# Patient Record
Sex: MALE | Race: WHITE | HISPANIC OR LATINO | Employment: OTHER | URBAN - METROPOLITAN AREA
[De-identification: names, ages, dates, MRNs, and addresses within clinical notes are randomized per-mention and may not be internally consistent; named-entity substitution may affect disease eponyms.]

---

## 2017-04-25 ENCOUNTER — LAB VISIT (OUTPATIENT)
Dept: LAB | Facility: HOSPITAL | Age: 82
End: 2017-04-25
Attending: UROLOGY
Payer: MEDICARE

## 2017-04-25 ENCOUNTER — OFFICE VISIT (OUTPATIENT)
Dept: UROLOGY | Facility: CLINIC | Age: 82
End: 2017-04-25
Payer: COMMERCIAL

## 2017-04-25 VITALS
DIASTOLIC BLOOD PRESSURE: 58 MMHG | SYSTOLIC BLOOD PRESSURE: 157 MMHG | HEIGHT: 67 IN | HEART RATE: 62 BPM | WEIGHT: 158.75 LBS | BODY MASS INDEX: 24.92 KG/M2

## 2017-04-25 DIAGNOSIS — N40.0 BENIGN PROSTATIC HYPERPLASIA, PRESENCE OF LOWER URINARY TRACT SYMPTOMS UNSPECIFIED, UNSPECIFIED MORPHOLOGY: Primary | ICD-10-CM

## 2017-04-25 DIAGNOSIS — N40.0 BENIGN PROSTATIC HYPERPLASIA, PRESENCE OF LOWER URINARY TRACT SYMPTOMS UNSPECIFIED, UNSPECIFIED MORPHOLOGY: ICD-10-CM

## 2017-04-25 DIAGNOSIS — R35.1 NOCTURIA: ICD-10-CM

## 2017-04-25 DIAGNOSIS — N52.01 ERECTILE DYSFUNCTION DUE TO ARTERIAL INSUFFICIENCY: ICD-10-CM

## 2017-04-25 LAB
BILIRUB SERPL-MCNC: NORMAL MG/DL
BLOOD URINE, POC: NORMAL
COLOR, POC UA: YELLOW
COMPLEXED PSA SERPL-MCNC: 1.1 NG/ML
GLUCOSE UR QL STRIP: NORMAL
KETONES UR QL STRIP: NORMAL
LEUKOCYTE ESTERASE URINE, POC: NORMAL
NITRITE, POC UA: NORMAL
PH, POC UA: 5
PROTEIN, POC: NORMAL
SPECIFIC GRAVITY, POC UA: 1
UROBILINOGEN, POC UA: NORMAL

## 2017-04-25 PROCEDURE — 81002 URINALYSIS NONAUTO W/O SCOPE: CPT | Mod: S$GLB,,, | Performed by: UROLOGY

## 2017-04-25 PROCEDURE — 99999 PR PBB SHADOW E&M-EST. PATIENT-LVL III: CPT | Mod: PBBFAC,,, | Performed by: UROLOGY

## 2017-04-25 PROCEDURE — 99214 OFFICE O/P EST MOD 30 MIN: CPT | Mod: 25,S$GLB,, | Performed by: UROLOGY

## 2017-04-25 PROCEDURE — 84153 ASSAY OF PSA TOTAL: CPT

## 2017-04-25 PROCEDURE — 36415 COLL VENOUS BLD VENIPUNCTURE: CPT | Mod: PO

## 2017-04-25 RX ORDER — TADALAFIL 20 MG/1
20 TABLET ORAL DAILY
Qty: 20 TABLET | Refills: 11 | Status: SHIPPED | OUTPATIENT
Start: 2017-04-25 | End: 2018-04-26

## 2017-04-25 NOTE — PROGRESS NOTES
UROLOGY [place]  4 25 17    Urinalysis: col yellow, sg 10, pH 5, leuco -, nitrites -, prot -, glucose -, bili -, blood -    c-c bph    Age 81, says he is voiding well, having no complaints. Stream is without hesitancy or need to strain.  No burning on urination and no pains.    Had turp by dr ryan at [facility]; did fine.          PMH:   SURGICAL: Tonsillectomy, TURP, hernia repair, eye surgery x4.   MEDICAL: Peptic ulcers, high cholesterol.   FAMILIAL: No family history of prostate cancer.   SOCIAL: The patient lives in [place], retired,  for 3 years, nonsmoker.   ALLERGIES: NKDA.             Current Outpatient Prescriptions on File Prior to Visit   Medication Sig Dispense Refill    aspirin (ECOTRIN) 81 MG EC tablet Take 1 tablet by mouth as needed.         atorvastatin (LIPITOR) 40 MG tablet Take 1 tablet by mouth.        ibuprofen (ADVIL,MOTRIN) 400 MG tablet Take 1 tablet (400 mg total) by mouth every 6 (six) hours as needed. 20 tablet 0    metoprolol succinate (TOPROL-XL) 25 MG 24 hr tablet 1 tablet Daily.                      ROS:  Denies malaise, headaches, eye symptoms, difficulty swallowing or breathing problems.   No chest pains or palpitations.   No change in bowel habits, no tarry stools or hematochezia. Occasional acid reflux.  No genital lesions.  No bleeding disorders, no seizures.        Pt alert, oriented, cooperative, no distress  HEENT: wnl.  Neck: supple, no JVD, no lymphadenopathy  Chest: CV NSR, no murmurs  Lungs: normal auscultation  ABDOMEN: Flat. No masses. CVAs are negative. No organomegaly or   tenderness.   Penis circumcised. Meatus normal. Testes normal. Prostate 30 gm, symmetric, benign.   Extremities: no edema, peripheral pulses nl  Neuro: preserved     PSA 1.3 one year ago     IMPRESSION: Nocturia, mild BPH, s/p turp, currently on observation  Erectile dysfunction, can use pde5 inhibitors prn  PLAN: RTC 1 year. I will update the psa today.

## 2017-04-25 NOTE — MR AVS SNAPSHOT
[facility]  [address]  [place] [place] [facility] [address]  Phone: [phone]                  Bernardo Oliveira   2017 10:15 AM   Office Visit    Description:  Male : 1935   Provider:  Juan F Mc MD   Department:  [facility]           Reason for Visit     Follow-up                To Do List           Future Appointments        Provider Department Dept Phone    2017 10:15 AM Juan F Mc MD [facility] [phone]      Goals (5 Years of Data)     None      [facility]     [facility] Nurse Care Line -  Assistance  Unless otherwise directed by your provider, please contact [facility], our nurse care line that is available for  assistance.     Registered nurses in the [facility] provide: appointment scheduling, clinical advisement, health education, and other advisory services.  Call: [phone] (toll free)               Medications           Message regarding Medications     Verify the changes and/or additions to your medication regime listed below are the same as discussed with your clinician today.  If any of these changes or additions are incorrect, please notify your healthcare provider.             Verify that the below list of medications is an accurate representation of the medications you are currently taking.  If none reported, the list may be blank. If incorrect, please contact your healthcare provider. Carry this list with you in case of emergency.           Current Medications     alprazolam (XANAX) 0.25 MG tablet Take 0.25 mg by mouth as needed.     aspirin (ECOTRIN) 81 MG EC tablet Take 1 tablet by mouth as needed.     atorvastatin (LIPITOR) 40 MG tablet Take 1 tablet by mouth.    lisinopril (PRINIVIL,ZESTRIL) 20 MG tablet Take 20 mg by mouth once daily.     metoprolol succinate (TOPROL-XL) 25 MG 24 hr tablet 1 tablet Daily.    ranitidine (ZANTAC) 150 MG tablet Take 150 mg by mouth as needed.      "ibuprofen (ADVIL,MOTRIN) 400 MG tablet Take 1 tablet (400 mg total) by mouth every 6 (six) hours as needed.           Clinical Reference Information           Your Vitals Were     BP Pulse Height Weight BMI    157/58 (BP Location: Right arm) 62 5' 7" (1.702 m) 72 kg (158 lb 11.7 oz) 24.86 kg/m2      Blood Pressure          Most Recent Value    BP  (!)  157/58      Allergies as of 4/25/2017     Codeine      Immunizations Administered on Date of Encounter - 4/25/2017     None      Language Assistance Services     ATTENTION: Language assistance services are available, free of charge. Please call [phone].      ATENCIÓN: Si habla luisa, tiene a morales disposición servicios gratuitos de asistencia lingüística. Llame al [phone].     CHÚ Ý: N?u b?n nói Ti?ng Vi?t, có các d?ch v? h? tr? ngôn ng? mi?n phí dành cho b?n. G?i s? [phone].         [facility] complies with applicable Federal civil rights laws and does not discriminate on the basis of race, color, national origin, age, disability, or sex.        "

## 2017-05-03 ENCOUNTER — TELEPHONE (OUTPATIENT)
Dept: UROLOGY | Facility: CLINIC | Age: 82
End: 2017-05-03

## 2018-02-08 ENCOUNTER — OFFICE VISIT (OUTPATIENT)
Dept: PULMONOLOGY | Facility: CLINIC | Age: 83
End: 2018-02-08
Payer: MEDICARE

## 2018-02-08 VITALS
DIASTOLIC BLOOD PRESSURE: 80 MMHG | WEIGHT: 163 LBS | OXYGEN SATURATION: 99 % | BODY MASS INDEX: 24.71 KG/M2 | SYSTOLIC BLOOD PRESSURE: 130 MMHG | HEIGHT: 68 IN | HEART RATE: 77 BPM

## 2018-02-08 DIAGNOSIS — Z77.090 ASBESTOS EXPOSURE: ICD-10-CM

## 2018-02-08 DIAGNOSIS — R93.89 ABNORMAL CXR: ICD-10-CM

## 2018-02-08 PROCEDURE — 1159F MED LIST DOCD IN RCRD: CPT | Mod: ,,, | Performed by: INTERNAL MEDICINE

## 2018-02-08 PROCEDURE — 99204 OFFICE O/P NEW MOD 45 MIN: CPT | Mod: ,,, | Performed by: INTERNAL MEDICINE

## 2018-02-08 PROCEDURE — 3008F BODY MASS INDEX DOCD: CPT | Mod: ,,, | Performed by: INTERNAL MEDICINE

## 2018-02-08 RX ORDER — LISINOPRIL 10 MG/1
10 TABLET ORAL 2 TIMES DAILY
COMMUNITY
Start: 2017-11-13 | End: 2018-12-18 | Stop reason: SDUPTHER

## 2018-02-08 RX ORDER — PREDNISONE 20 MG/1
TABLET ORAL
COMMUNITY
Start: 2018-02-05 | End: 2018-04-26

## 2018-02-08 RX ORDER — DOXYCYCLINE 100 MG/1
CAPSULE ORAL
COMMUNITY
Start: 2018-02-05 | End: 2018-04-26

## 2018-02-08 RX ORDER — BENZONATATE 100 MG/1
CAPSULE ORAL
COMMUNITY
Start: 2018-02-05 | End: 2018-04-26

## 2018-02-08 NOTE — PROGRESS NOTES
New Office Visit/Consultation Note    Patient Name: Bernardo Oliveira  MRN: [id number]  : 1935      Reason for visit: Abnormal CXR    HPI:     2018 - 83 yo male was in Urgent Care with bronchitis had CXR showig a lung nodule.  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  We reviewed old films in [facility] - CXR in 2010 looks unchanged ad that report states that it is unchanged since  (making this stable for about 11 years).  He has no symptoms.  He did smoke (probably < 20 pack years and quit > 40 years ago).  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    Past Medical History    Past Medical History:   Diagnosis Date    Anxiety     Cancer     right vocal cord with relapse.     GERD (gastroesophageal reflux disease)     Hyperlipidemia     Hypoglycemic coma     Pneumonia     Ulcer        Past Surgical History    Past Surgical History:   Procedure Laterality Date    cataracts      dilcia    CHOLECYSTECTOMY      CIRCUMCISION      EYE SURGERY      retina detachment right    HERNIA REPAIR      x 2    THROAT SURGERY      X 2 for vocal cord cancer    TONSILLECTOMY      TRANSURETHRAL RESECTION OF PROSTATE      dr cindi ryan - [facility]       Medications      Current Outpatient Prescriptions:     alprazolam (XANAX) 0.25 MG tablet, Take 0.25 mg by mouth as needed. , Disp: , Rfl:     aspirin (ECOTRIN) 81 MG EC tablet, Take 1 tablet by mouth as needed. , Disp: , Rfl:     atorvastatin (LIPITOR) 40 MG tablet, Take 1 tablet by mouth., Disp: , Rfl:     benzonatate (TESSALON) 100 MG capsule, , Disp: , Rfl:     doxycycline (VIBRAMYCIN) 100 MG Cap, , Disp: , Rfl:     FLUZONE HIGH-DOSE , PF, 180 mcg/0.5 mL vaccine, , Disp: , Rfl:     ibuprofen (ADVIL,MOTRIN) 400 MG tablet, Take 1 tablet (400 mg total) by mouth every 6 (six) hours as needed., Disp: 20 tablet, Rfl: 0    lisinopril 10 MG tablet, ,  "Disp: , Rfl:     metoprolol succinate (TOPROL-XL) 25 MG 24 hr tablet, 1 tablet Daily., Disp: , Rfl:     predniSONE (DELTASONE) 20 MG tablet, , Disp: , Rfl:     ranitidine (ZANTAC) 150 MG tablet, Take 150 mg by mouth as needed. , Disp: , Rfl:     tadalafil (CIALIS) 20 MG Tab, Take 1 tablet (20 mg total) by mouth once daily., Disp: 20 tablet, Rfl: 11    Allergies    Review of patient's allergies indicates:   Allergen Reactions    Codeine        SocHx    History   Smoking Status    Former Smoker    Packs/day: 1.50    Quit date: 5/25/1982   Smokeless Tobacco    Never Used       History   Alcohol Use    Yes     Comment: occ       Drug Use - no  Occupation - retired, worked as  on [employer]  Asbestos exposure - +    Review of Systems  Review of Systems   Constitutional: Negative for chills, diaphoresis, fever, malaise/fatigue and weight loss.   HENT: Negative for congestion.    Eyes: Negative for pain.   Respiratory: Negative for cough, hemoptysis, sputum production, shortness of breath, wheezing and stridor.    Cardiovascular: Negative for chest pain, palpitations, orthopnea, claudication, leg swelling and PND.   Gastrointestinal: Negative for abdominal pain, constipation, diarrhea, heartburn, nausea and vomiting.   Genitourinary: Negative for dysuria, frequency and urgency.   Musculoskeletal: Negative for falls and myalgias.   Neurological: Negative for sensory change, focal weakness and weakness.   Psychiatric/Behavioral: Negative for depression. The patient is not nervous/anxious.        Physical Exam    Vitals:    02/08/18 0916   BP: 130/80   Pulse: 77   SpO2: 99%   Weight: 73.9 kg (163 lb)   Height: 5' 8" (1.727 m)       Physical Exam   Constitutional: He is oriented to person, place, and time. He appears well-developed and well-nourished. No distress.   HENT:   Head: Normocephalic and atraumatic.   Right Ear: External ear normal.   Left Ear: External ear normal.   Nose: Nose normal. "   Mouth/Throat: Oropharynx is clear and moist.   Eyes: EOM are normal. Pupils are equal, round, and reactive to light.   Neck: Normal range of motion. Neck supple. No JVD present. No tracheal deviation present. No thyromegaly present.   Cardiovascular: Normal rate, regular rhythm, normal heart sounds and intact distal pulses.  Exam reveals no gallop and no friction rub.    No murmur heard.  Pulmonary/Chest: Effort normal and breath sounds normal. No stridor. No respiratory distress. He has no wheezes. He has no rales. He exhibits no tenderness.   Abdominal: Soft. Bowel sounds are normal. He exhibits no distension.   Musculoskeletal: Normal range of motion. He exhibits no edema or tenderness.   Lymphadenopathy:     He has no cervical adenopathy.   Neurological: He is alert and oriented to person, place, and time. He has normal reflexes. No cranial nerve deficit.   Skin: He is not diaphoretic.   Psychiatric: He has a normal mood and affect. His behavior is normal.   Nursing note and vitals reviewed.      Labs    Lab Results   Component Value Date    WBC 5.6 05/28/2012    HGB 14.7 05/28/2012    HCT 44.2 05/28/2012     05/28/2012       Sodium   Date Value Ref Range Status   05/28/2012 142 135 - 145 mmol/L Final     Potassium   Date Value Ref Range Status   05/28/2012 4.3 3.5 - 5.0 mmol/L Final     Chloride   Date Value Ref Range Status   05/28/2012 105 98 - 107 mmol/L Final     CO2   Date Value Ref Range Status   05/28/2012 30 22 - 32 mmol/L Final     Glucose   Date Value Ref Range Status   10/12/2011 88 70 - 110 mg/dl Final     BUN, Bld   Date Value Ref Range Status   05/28/2012 13 9 - 24 mg/dl Final     Creatinine   Date Value Ref Range Status   05/28/2012 0.8 0.2 - 1.4 mg/dl Final     Calcium   Date Value Ref Range Status   05/28/2012 9.3 8.6 - 10.2 mg/dl Final     Total Protein   Date Value Ref Range Status   05/28/2012 6.8 6.0 - 8.0 g/dL Final     Albumin   Date Value Ref Range Status   10/12/2011 3.5 3.5 -  5.2 g/dl Final     ALBUMIN   Date Value Ref Range Status   05/28/2012 4.3 3.5 - 5.0 g/dl Final     Total Bilirubin   Date Value Ref Range Status   10/12/2011 0.8 0.1 - 1.0 mg/dl Final     Comment:     For infants and newborns, interpretation of results should be based  on gestational age, weight and in agreement with clinical  observations.  .  Premature Infant recommended reference ranges:  Up to 24 hours.............<8.0 mg/dl  Up to 48 hours............<12.0 mg/dl  3-5 days..................<15.0 mg/dl  6-29 days.................<15.0 mg/dl     Alkaline Phosphatase   Date Value Ref Range Status   05/28/2012 64 23 - 119 UNIT/L Final     AST   Date Value Ref Range Status   05/28/2012 17 10 - 34 UNIT/L Final     ALT   Date Value Ref Range Status   05/28/2012 24 10 - 44 UNIT/L Final     Anion Gap   Date Value Ref Range Status   05/28/2012 7 5 - 15 meq/L Final       Xrays        Impression/Plan    Problem List Items Addressed This Visit        Other    Abnormal CXR  - has been stable since at least 2007  - no further workup needed    Asbestos exposure  - probably minimal  - should get yearly CXDR  - RTC 1 year          I have spent about 45 minutes with the patient taking the history and examining the patient.  We have discussed the diagnoses and current plan and all questions have been answered.  We have discussed the follow up plan.  The patient and family (if present) know to contact the office with any questions they may have.        Humberto Gipson MD

## 2018-04-26 ENCOUNTER — OFFICE VISIT (OUTPATIENT)
Dept: UROLOGY | Facility: CLINIC | Age: 83
End: 2018-04-26
Payer: MEDICARE

## 2018-04-26 ENCOUNTER — LAB VISIT (OUTPATIENT)
Dept: LAB | Facility: HOSPITAL | Age: 83
End: 2018-04-26
Attending: UROLOGY
Payer: MEDICARE

## 2018-04-26 VITALS
DIASTOLIC BLOOD PRESSURE: 80 MMHG | SYSTOLIC BLOOD PRESSURE: 160 MMHG | HEART RATE: 64 BPM | HEIGHT: 68 IN | BODY MASS INDEX: 24.83 KG/M2 | WEIGHT: 163.81 LBS

## 2018-04-26 DIAGNOSIS — N40.0 BENIGN PROSTATIC HYPERPLASIA WITHOUT LOWER URINARY TRACT SYMPTOMS: Primary | ICD-10-CM

## 2018-04-26 DIAGNOSIS — R35.1 NOCTURIA: ICD-10-CM

## 2018-04-26 DIAGNOSIS — N40.0 BENIGN PROSTATIC HYPERPLASIA WITHOUT LOWER URINARY TRACT SYMPTOMS: ICD-10-CM

## 2018-04-26 LAB
BILIRUB SERPL-MCNC: NORMAL MG/DL
BLOOD URINE, POC: NORMAL
COLOR, POC UA: YELLOW
COMPLEXED PSA SERPL-MCNC: 1.4 NG/ML
GLUCOSE UR QL STRIP: NORMAL
KETONES UR QL STRIP: NORMAL
LEUKOCYTE ESTERASE URINE, POC: NORMAL
NITRITE, POC UA: NORMAL
PH, POC UA: 7
PROTEIN, POC: NORMAL
SPECIFIC GRAVITY, POC UA: 1.02
UROBILINOGEN, POC UA: NORMAL

## 2018-04-26 PROCEDURE — 99999 PR PBB SHADOW E&M-EST. PATIENT-LVL III: CPT | Mod: PBBFAC,,, | Performed by: UROLOGY

## 2018-04-26 PROCEDURE — 36415 COLL VENOUS BLD VENIPUNCTURE: CPT | Mod: PO

## 2018-04-26 PROCEDURE — 99214 OFFICE O/P EST MOD 30 MIN: CPT | Mod: 25,S$GLB,, | Performed by: UROLOGY

## 2018-04-26 PROCEDURE — 84153 ASSAY OF PSA TOTAL: CPT

## 2018-04-26 PROCEDURE — 81002 URINALYSIS NONAUTO W/O SCOPE: CPT | Mod: S$GLB,,, | Performed by: UROLOGY

## 2018-04-26 NOTE — PROGRESS NOTES
UROLOGY [place]  4 26 18    c-c bph    Age 82, comes in for routine check. Has no complaints with his voiding.     No burning on urination and no pains. Occasional nocturia.     Had turp by dr ryan at [facility] 15 years ago; did fine.          PMH:   SURGICAL: Tonsillectomy, TURP, hernia repair, eye surgery x4.   MEDICAL: Peptic ulcers, high cholesterol.   FAMILIAL: No family history of prostate cancer.   SOCIAL: The patient lives in [place], retired,  for 3 years, nonsmoker.   ALLERGIES: NKDA.                  Current Outpatient Prescriptions on File Prior to Visit   Medication Sig Dispense Refill    aspirin (ECOTRIN) 81 MG EC tablet Take 1 tablet by mouth as needed.         atorvastatin (LIPITOR)  Take 1 tablet by mouth.        ibuprofen (ADVIL,MOTRIN) 400 MG tablet Take 1 tablet (400 mg total) by mouth every 6 (six) vilma 20 tablet 0    metoprolol succinate (TOPROL-XL) 25 MG 24 hr ta 1 tablet Daily.                   ROS:  Denies malaise, headaches, eye symptoms, difficulty swallowing or breathing problems.   No chest pains or palpitations.   No change in bowel habits, no tarry stools or hematochezia. Occasional acid reflux.  No genital lesions.  No bleeding disorders, no seizures.  Psych normal mood, normal affect        Pt alert, oriented, no distress  HEENT: wnl.  Neck: supple, no JVD, no lymphadenopathy  Chest: CV NSR  Lungs: normal chest expansion  ABDOMEN: Flat. No masses. CVAs are negative. No organomegaly or   tenderness.   Penis circumcised. Meatus normal. Testes normal.   Prostate 30 gm, symmetric, benign.   Extremities: no edema, peripheral pulses nl  Neuro: preserved     PSA 1.1 one year ago     IMPRESSION:   bph s/p turp, now on observation  Nocturia  PLAN: RTC 1 year. I will update the psa today.

## 2018-12-10 ENCOUNTER — OFFICE VISIT (OUTPATIENT)
Dept: FAMILY MEDICINE | Facility: CLINIC | Age: 83
End: 2018-12-10
Payer: MEDICARE

## 2018-12-10 VITALS
WEIGHT: 164 LBS | BODY MASS INDEX: 24.86 KG/M2 | HEIGHT: 68 IN | DIASTOLIC BLOOD PRESSURE: 77 MMHG | SYSTOLIC BLOOD PRESSURE: 154 MMHG | HEART RATE: 69 BPM

## 2018-12-10 DIAGNOSIS — R53.83 FATIGUE, UNSPECIFIED TYPE: ICD-10-CM

## 2018-12-10 DIAGNOSIS — I10 ESSENTIAL HYPERTENSION: Primary | ICD-10-CM

## 2018-12-10 DIAGNOSIS — E78.2 MIXED DYSLIPIDEMIA: ICD-10-CM

## 2018-12-10 DIAGNOSIS — F41.9 ANXIETY DISORDER, UNSPECIFIED TYPE: ICD-10-CM

## 2018-12-10 DIAGNOSIS — K21.9 GASTROESOPHAGEAL REFLUX DISEASE WITHOUT ESOPHAGITIS: ICD-10-CM

## 2018-12-10 PROCEDURE — 99203 OFFICE O/P NEW LOW 30 MIN: CPT | Mod: ,,, | Performed by: INTERNAL MEDICINE

## 2018-12-10 PROCEDURE — 1101F PT FALLS ASSESS-DOCD LE1/YR: CPT | Mod: ,,, | Performed by: INTERNAL MEDICINE

## 2018-12-10 RX ORDER — ATORVASTATIN CALCIUM 40 MG/1
40 TABLET, FILM COATED ORAL DAILY
Qty: 90 TABLET | Refills: 2 | Status: SHIPPED | OUTPATIENT
Start: 2018-12-10 | End: 2018-12-18 | Stop reason: SDUPTHER

## 2018-12-10 RX ORDER — ALPRAZOLAM 0.25 MG/1
0.25 TABLET ORAL
Qty: 30 TABLET | Refills: 1 | Status: SHIPPED | OUTPATIENT
Start: 2018-12-10 | End: 2019-07-31 | Stop reason: SDUPTHER

## 2018-12-10 NOTE — PATIENT INSTRUCTIONS
Anxiety Reaction  Anxiety is the feeling we all get when we think something bad might happen. It is a normal response to stress and usually causes only a mild reaction. When anxiety becomes more severe, it can interfere with daily life. In some cases, you may not even be aware of what it is youre anxious about. There may also be a genetic link or it may be a learned behavior in the home.  Both psychological and physical triggers cause stress reaction. It's often a response to fear or emotional stress, real or imagined. This stress may come from home, family, work, or social relationships.  During an anxiety reaction, you may feel:  · Helpless  · Nervous  · Depressed  · Irritable  Your body may show signs of anxiety in many ways. You may experience:  · Dry mouth  · Shakiness  · Dizziness  · Weakness  · Trouble breathing  · Breathing fast (hyperventilating)  · Chest pressure  · Sweating  · Headache  · Nausea  · Diarrhea  · Tiredness  · Inability to sleep  · Sexual problems  Home care  · Try to locate the sources of stress in your life. They may not be obvious. These may include:  ¨ Daily hassles of life (traffic jams, missed appointments, car troubles, etc.)  ¨ Major life changes, both good (new baby, job promotion) and bad (loss of job, loss of loved one)  ¨ Overload: feeling that you have too many responsibilities and can't take care of all of them at once  ¨ Feeling helpless, feeling that your problems are beyond what youre able to solve  · Notice how your body reacts to stress. Learn to listen to your body signals. This will help you take action before the stress becomes severe.  · When you can, do something about the source of your stress. (Avoid hassles, limit the amount of change that happens in your life at one time and take a break when you feel overloaded).  · Unfortunately, many stressful situations can't be avoided. It is necessary to learn how to better manage stress. There are many proven methods  that will reduce your anxiety. These include simple things like exercise, good nutrition and adequate rest. Also, there are certain techniques that are helpful:  ¨ Relaxation  ¨ Breathing exercises  ¨ Visualization  ¨ Biofeedback  ¨ Meditation  For more information about this, consult your doctor or go to a local bookstore and review the many books and tapes available on this subject.  Follow-up care  If you feel that your anxiety is not responding to self-help measures, contact your doctor or make an appointment with a counselor. You may need short-term psychological counseling and temporary medicine to help you manage stress.  Call 911  Call your healthcare provider right away if any of these occur:  · Trouble breathing  · Confusion  · Drowsiness or trouble wakening  · Fainting or loss of consciousness  · Rapid heart rate  · Seizure  · New chest pain that becomes more severe, lasts longer, or spreads into your shoulder, arm, neck, jaw, or back  When to seek medical advice  Call your healthcare provider right away if any of these occur:  · Your symptoms get worse  · Severe headache not relieved by rest and mild pain reliever  Date Last Reviewed: 9/29/2015  © 5507-8841 [employer]. [address] [place] [address]. All rights reserved. This information is not intended as a substitute for professional medical care. Always follow your healthcare professional's instructions.

## 2018-12-10 NOTE — PROGRESS NOTES
Subjective:       Patient ID: Bernardo Oliveira is a 83 y.o. male.    Chief Complaint: Hypertension; Anxiety; Hyperlipidemia; and Gastroesophageal Reflux    Hypertension   This is a chronic (X 13 yrs since Zoraida) problem. The current episode started more than 1 year ago. Associated symptoms include anxiety. Pertinent negatives include no chest pain, palpitations, PND or shortness of breath. There are no associated agents to hypertension. Risk factors for coronary artery disease include male gender and sedentary lifestyle. Past treatments include beta blockers and ACE inhibitors. The current treatment provides moderate improvement. There is no history of coarctation of the aorta, hyperaldosteronism, pheochromocytoma or renovascular disease.   Anxiety   Presents for follow-up visit. Patient reports no chest pain, compulsions, confusion, decreased concentration, dizziness, hyperventilation, nervous/anxious behavior, palpitations, restlessness or shortness of breath. Primary symptoms comment: X 5 yrs. Symptoms occur most days.       Hyperlipidemia   This is a chronic problem. The current episode started more than 1 year ago. He has no history of hypothyroidism or obesity. Pertinent negatives include no chest pain or shortness of breath. Current antihyperlipidemic treatment includes statins. The current treatment provides moderate improvement of lipids. Risk factors for coronary artery disease include hypertension and a sedentary lifestyle.   Gastroesophageal Reflux   He complains of heartburn. He reports no abdominal pain, no chest pain or no coughing. X 5 yrs. This is a recurrent problem. The current episode started more than 1 year ago. The problem has been waxing and waning. Fatigue: mild. Risk factors include lack of exercise. He has tried a histamine-2 antagonist for the symptoms. The treatment provided moderate relief.       Past Medical History:   Diagnosis Date    Anxiety     Cancer     right vocal cord with  relapse.     Depression     GERD (gastroesophageal reflux disease)     Hyperlipidemia     Hypertension     Hypoglycemic coma     Pneumonia     Ulcer      Social History     Socioeconomic History    Marital status:      Spouse name: Not on file    Number of children: Not on file    Years of education: Not on file    Highest education level: Not on file   Social Needs    Financial resource strain: Not on file    Food insecurity - worry: Not on file    Food insecurity - inability: Not on file    Transportation needs - medical: Not on file    Transportation needs - non-medical: Not on file   Occupational History    Not on file   Tobacco Use    Smoking status: Former Smoker     Packs/day: 1.50     Last attempt to quit: 1982     Years since quittin.5    Smokeless tobacco: Never Used   Substance and Sexual Activity    Alcohol use: Yes     Comment: occ    Drug use: No    Sexual activity: No   Other Topics Concern    Not on file   Social History Narrative    Not on file     Past Surgical History:   Procedure Laterality Date    cataracts      dilcia    CHOLECYSTECTOMY      CIRCUMCISION      COLONOSCOPY N/A 2012    [facility]    EGD (ESOPHAGOGASTRODUODENOSCOPY) N/A 2012    [facility]    EYE SURGERY      retina detachment right    HERNIA REPAIR      x 2    THROAT SURGERY      X 2 for vocal cord cancer    TONSILLECTOMY      TRANSURETHRAL RESECTION OF PROSTATE      dr cindi ryan - [facility]     History reviewed. No pertinent family history.    Review of Systems   Constitutional: Negative for activity change, appetite change and unexpected weight change. Fatigue: mild.   HENT: Positive for hearing loss. Negative for congestion, sneezing and trouble swallowing.    Eyes: Negative for pain and visual disturbance.   Respiratory: Negative for cough, chest tightness and shortness of breath.   "  Cardiovascular: Negative for chest pain, palpitations, leg swelling and PND.   Gastrointestinal: Positive for heartburn. Negative for abdominal distention, abdominal pain, blood in stool, constipation and diarrhea.   Endocrine: Negative for cold intolerance, heat intolerance, polydipsia, polyphagia and polyuria.   Genitourinary: Negative for dysuria, hematuria and scrotal swelling.   Musculoskeletal: Negative for arthralgias, back pain and gait problem.   Skin: Negative for pallor, rash and wound.   Allergic/Immunologic: Negative for environmental allergies, food allergies and immunocompromised state.   Neurological: Negative for dizziness, seizures, speech difficulty, light-headedness and numbness.   Hematological: Negative for adenopathy. Does not bruise/bleed easily.   Psychiatric/Behavioral: Negative for agitation, behavioral problems, confusion and decreased concentration. The patient is not nervous/anxious.          Objective:      Blood pressure (!) 154/77, pulse 69, height 5' 8" (1.727 m), weight 74.4 kg (164 lb). Body mass index is 24.94 kg/m².  Physical Exam   Constitutional: He appears well-developed.   HENT:   Head: Normocephalic and atraumatic.   Nose: Nose normal.   Mouth/Throat: Oropharynx is clear and moist. No oropharyngeal exudate.   Eyes: Conjunctivae and EOM are normal.   Neck: Normal range of motion. Neck supple. No JVD present. No tracheal deviation present. No thyromegaly present.   Cardiovascular: Normal rate, regular rhythm and normal heart sounds. Exam reveals no gallop and no friction rub.   No murmur heard.  Pulmonary/Chest: Effort normal and breath sounds normal. No respiratory distress. He has no wheezes. He has no rales.   Abdominal: Soft. Bowel sounds are normal. He exhibits no distension. There is no tenderness.   Neurological: He is alert. He has normal reflexes.   Skin: Skin is warm and dry.   Psychiatric: He has a normal mood and affect.   Nursing note and vitals reviewed.    "     Assessment:       1. Essential hypertension    2. Gastroesophageal reflux disease without esophagitis    3. Mixed dyslipidemia    4. Anxiety disorder, unspecified type           No visits with results within 3 Month(s) from this visit.   Latest known visit with results is:   Lab Visit on 04/26/2018   Component Date Value Ref Range Status    PSA DIAGNOSTIC 04/26/2018 1.4  0.00 - 4.00 ng/mL Final         Plan:           Essential hypertension    Gastroesophageal reflux disease without esophagitis    Mixed dyslipidemia    Anxiety disorder, unspecified type      Patient has been advised to watch diet and exercise. Avoid fried and fatty food. Compliance to medications and follow up urged.    The patient is asked to make an attempt to improve diet and exercise patterns to aid in medical management of this problem.      Patient has been advised to watch diet and exercise. Avoid fried and fatty food. Compliance to medications and follow up urged.    The patient is asked to make an attempt to improve diet and exercise patterns to aid in medical management of this problem.          Current Outpatient Medications:     alprazolam (XANAX) 0.25 MG tablet, Take 0.25 mg by mouth as needed. , Disp: , Rfl:     atorvastatin (LIPITOR) 40 MG tablet, Take 1 tablet by mouth., Disp: , Rfl:     lisinopril 10 MG tablet, Take 10 mg by mouth 2 (two) times daily. , Disp: , Rfl:     metoprolol succinate (TOPROL-XL) 25 MG 24 hr tablet, 1 tablet Daily., Disp: , Rfl:     ranitidine (ZANTAC) 150 MG tablet, Take 150 mg by mouth as needed. , Disp: , Rfl:

## 2018-12-18 DIAGNOSIS — F41.9 ANXIETY DISORDER, UNSPECIFIED TYPE: ICD-10-CM

## 2018-12-18 DIAGNOSIS — K21.9 GASTROESOPHAGEAL REFLUX DISEASE WITHOUT ESOPHAGITIS: ICD-10-CM

## 2018-12-18 DIAGNOSIS — E78.2 MIXED DYSLIPIDEMIA: ICD-10-CM

## 2018-12-18 RX ORDER — MUPIROCIN 20 MG/G
1 OINTMENT TOPICAL 3 TIMES DAILY
Qty: 22 G | Refills: 0 | Status: SHIPPED | OUTPATIENT
Start: 2018-12-18 | End: 2019-02-19

## 2018-12-18 RX ORDER — METOPROLOL SUCCINATE 25 MG/1
25 TABLET, EXTENDED RELEASE ORAL DAILY
Qty: 90 TABLET | Refills: 1 | Status: SHIPPED | OUTPATIENT
Start: 2018-12-18 | End: 2019-09-22 | Stop reason: SDUPTHER

## 2018-12-18 RX ORDER — ATORVASTATIN CALCIUM 40 MG/1
40 TABLET, FILM COATED ORAL DAILY
Qty: 90 TABLET | Refills: 1 | Status: SHIPPED | OUTPATIENT
Start: 2018-12-18 | End: 2020-06-11 | Stop reason: SDUPTHER

## 2018-12-18 RX ORDER — ALPRAZOLAM 0.25 MG/1
0.25 TABLET ORAL
Qty: 30 TABLET | Refills: 1 | Status: CANCELLED | OUTPATIENT
Start: 2018-12-18

## 2018-12-18 RX ORDER — ALPRAZOLAM 0.25 MG/1
0.25 TABLET ORAL
Qty: 30 TABLET | Refills: 1 | OUTPATIENT
Start: 2018-12-18

## 2018-12-18 RX ORDER — LISINOPRIL 10 MG/1
10 TABLET ORAL 2 TIMES DAILY
Qty: 180 TABLET | Refills: 1 | Status: SHIPPED | OUTPATIENT
Start: 2018-12-18 | End: 2019-06-10 | Stop reason: SDUPTHER

## 2018-12-18 RX ORDER — MUPIROCIN 20 MG/G
1 OINTMENT TOPICAL 3 TIMES DAILY
COMMUNITY
End: 2018-12-18 | Stop reason: SDUPTHER

## 2019-02-12 ENCOUNTER — OFFICE VISIT (OUTPATIENT)
Dept: PULMONOLOGY | Facility: CLINIC | Age: 84
End: 2019-02-12
Payer: MEDICARE

## 2019-02-12 VITALS
HEART RATE: 76 BPM | WEIGHT: 166 LBS | BODY MASS INDEX: 25.16 KG/M2 | DIASTOLIC BLOOD PRESSURE: 80 MMHG | SYSTOLIC BLOOD PRESSURE: 136 MMHG | HEIGHT: 68 IN | OXYGEN SATURATION: 99 %

## 2019-02-12 DIAGNOSIS — R93.89 ABNORMAL CXR: Primary | ICD-10-CM

## 2019-02-12 DIAGNOSIS — Z77.090 ASBESTOS EXPOSURE: ICD-10-CM

## 2019-02-12 PROCEDURE — 1101F PR PT FALLS ASSESS DOC 0-1 FALLS W/OUT INJ PAST YR: ICD-10-PCS | Mod: ,,, | Performed by: INTERNAL MEDICINE

## 2019-02-12 PROCEDURE — 3075F SYST BP GE 130 - 139MM HG: CPT | Mod: ,,, | Performed by: INTERNAL MEDICINE

## 2019-02-12 PROCEDURE — 3075F PR MOST RECENT SYSTOLIC BLOOD PRESS GE 130-139MM HG: ICD-10-PCS | Mod: ,,, | Performed by: INTERNAL MEDICINE

## 2019-02-12 PROCEDURE — 99213 PR OFFICE/OUTPT VISIT, EST, LEVL III, 20-29 MIN: ICD-10-PCS | Mod: ,,, | Performed by: INTERNAL MEDICINE

## 2019-02-12 PROCEDURE — 3079F DIAST BP 80-89 MM HG: CPT | Mod: ,,, | Performed by: INTERNAL MEDICINE

## 2019-02-12 PROCEDURE — 99213 OFFICE O/P EST LOW 20 MIN: CPT | Mod: ,,, | Performed by: INTERNAL MEDICINE

## 2019-02-12 PROCEDURE — 3079F PR MOST RECENT DIASTOLIC BLOOD PRESSURE 80-89 MM HG: ICD-10-PCS | Mod: ,,, | Performed by: INTERNAL MEDICINE

## 2019-02-12 PROCEDURE — 1101F PT FALLS ASSESS-DOCD LE1/YR: CPT | Mod: ,,, | Performed by: INTERNAL MEDICINE

## 2019-02-12 NOTE — PROGRESS NOTES
Office Visit    Patient Name: Bernardo Oliveira  MRN: [id number]  : 1935      Reason for visit: Abnormal CXR    HPI:     2018 - 83 yo male was in Urgent Care with bronchitis had CXR showig a lung nodule.  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  We reviewed old films in [facility] - CXR in  looks unchanged ad that report states that it is unchanged since  (making this stable for about 11 years).  He has no symptoms.  He did smoke (probably < 20 pack years and quit > 40 years ago).  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    2019 - Here for follow up, stable, no new respiratory complaints.  Has not been in ER or hospitalized.  Had a sinus infection (or possibly the flu) in January - better now.    Past Medical History    Past Medical History:   Diagnosis Date    Anxiety     Cancer     right vocal cord with relapse.     Depression     GERD (gastroesophageal reflux disease)     Hyperlipidemia     Hypertension     Hypoglycemic coma     Pneumonia     Ulcer        Past Surgical History    Past Surgical History:   Procedure Laterality Date    cataracts      dilcia    CHOLECYSTECTOMY      CIRCUMCISION      COLONOSCOPY N/A 2012    [facility]    EGD (ESOPHAGOGASTRODUODENOSCOPY) N/A 2012    Per[facility] at [facility] END[facility]    EYE SURGERY      retina detachment right    HERNIA REPAIR      x 2    THROAT SURGERY      X 2 for vocal cord cancer    TONSILLECTOMY      TRANSURETHRAL RESECTION OF PROSTATE      dr cindi ryan - [facility]       Medications      Current Outpatient Medications:     ALPRAZolam (XANAX) 0.25 MG tablet, Take 1 tablet (0.25 mg total) by mouth as needed for Anxiety., Disp: 30 tablet, Rfl: 1    atorvastatin (LIPITOR) 40 MG tablet, Take 1 tablet (40 mg total) by mouth once daily., Disp: 90 tablet, Rfl: 1     "lisinopril 10 MG tablet, Take 1 tablet (10 mg total) by mouth 2 (two) times daily., Disp: 180 tablet, Rfl: 1    metoprolol succinate (TOPROL-XL) 25 MG 24 hr tablet, Take 1 tablet (25 mg total) by mouth once daily., Disp: 90 tablet, Rfl: 1    mupirocin (BACTROBAN) 2 % ointment, Apply 1 g topically 3 (three) times daily., Disp: 22 g, Rfl: 0    ranitidine (ZANTAC) 150 MG tablet, Take 1 tablet (150 mg total) by mouth daily as needed for Heartburn., Disp: 90 tablet, Rfl: 1    Allergies    Review of patient's allergies indicates:   Allergen Reactions    Codeine        SocHx    Social History     Tobacco Use   Smoking Status Former Smoker    Packs/day: 1.50    Last attempt to quit: 1982    Years since quittin.7   Smokeless Tobacco Never Used       Social History     Substance and Sexual Activity   Alcohol Use Yes    Comment: occ       Drug Use - no  Occupation - retired, worked as  on [employer]  Asbestos exposure - +    Review of Systems  Review of Systems   Constitutional: Negative for chills, diaphoresis, fever, malaise/fatigue and weight loss.   HENT: Negative for congestion.    Eyes: Negative for pain.   Respiratory: Negative for cough, hemoptysis, sputum production, shortness of breath, wheezing and stridor.    Cardiovascular: Negative for chest pain, palpitations, orthopnea, claudication, leg swelling and PND.   Gastrointestinal: Negative for abdominal pain, constipation, diarrhea, heartburn, nausea and vomiting.   Genitourinary: Negative for dysuria, frequency and urgency.   Musculoskeletal: Negative for falls and myalgias.   Neurological: Negative for sensory change, focal weakness and weakness.   Psychiatric/Behavioral: Negative for depression. The patient is not nervous/anxious.        Physical Exam    Vitals:    19 0904   BP: 136/80   Pulse: 76   SpO2: 99%   Weight: 75.3 kg (166 lb)   Height: 5' 8" (1.727 m)       Physical Exam   Constitutional: He is oriented to person, " place, and time. He appears well-developed and well-nourished. No distress.   HENT:   Head: Normocephalic and atraumatic.   Right Ear: External ear normal.   Left Ear: External ear normal.   Nose: Nose normal.   Mouth/Throat: Oropharynx is clear and moist.   Eyes: EOM are normal. Pupils are equal, round, and reactive to light.   Neck: Normal range of motion. Neck supple. No JVD present. No tracheal deviation present. No thyromegaly present.   Cardiovascular: Normal rate, regular rhythm, normal heart sounds and intact distal pulses. Exam reveals no gallop and no friction rub.   No murmur heard.  Pulmonary/Chest: Effort normal and breath sounds normal. No stridor. No respiratory distress. He has no wheezes. He has no rales. He exhibits no tenderness.   Abdominal: Soft. Bowel sounds are normal. He exhibits no distension.   Musculoskeletal: Normal range of motion. He exhibits no edema or tenderness.   Lymphadenopathy:     He has no cervical adenopathy.   Neurological: He is alert and oriented to person, place, and time. He has normal reflexes. No cranial nerve deficit.   Skin: He is not diaphoretic.   Psychiatric: He has a normal mood and affect. His behavior is normal.   Nursing note and vitals reviewed.      Labs    Lab Results   Component Value Date    WBC 5.6 05/28/2012    HGB 14.7 05/28/2012    HCT 44.2 05/28/2012     05/28/2012       Sodium   Date Value Ref Range Status   05/28/2012 142 135 - 145 mmol/L Final     Potassium   Date Value Ref Range Status   05/28/2012 4.3 3.5 - 5.0 mmol/L Final     Chloride   Date Value Ref Range Status   05/28/2012 105 98 - 107 mmol/L Final     CO2   Date Value Ref Range Status   05/28/2012 30 22 - 32 mmol/L Final     Glucose   Date Value Ref Range Status   10/12/2011 88 70 - 110 mg/dl Final     BUN, Bld   Date Value Ref Range Status   05/28/2012 13 9 - 24 mg/dl Final     Creatinine   Date Value Ref Range Status   05/28/2012 0.8 0.2 - 1.4 mg/dl Final     Calcium   Date Value  Ref Range Status   05/28/2012 9.3 8.6 - 10.2 mg/dl Final     Total Protein   Date Value Ref Range Status   05/28/2012 6.8 6.0 - 8.0 g/dL Final     Albumin   Date Value Ref Range Status   10/12/2011 3.5 3.5 - 5.2 g/dl Final     ALBUMIN   Date Value Ref Range Status   05/28/2012 4.3 3.5 - 5.0 g/dl Final     Total Bilirubin   Date Value Ref Range Status   10/12/2011 0.8 0.1 - 1.0 mg/dl Final     Comment:     For infants and newborns, interpretation of results should be based  on gestational age, weight and in agreement with clinical  observations.  .  Premature Infant recommended reference ranges:  Up to 24 hours.............<8.0 mg/dl  Up to 48 hours............<12.0 mg/dl  3-5 days..................<15.0 mg/dl  6-29 days.................<15.0 mg/dl     Alkaline Phosphatase   Date Value Ref Range Status   05/28/2012 64 23 - 119 UNIT/L Final     AST   Date Value Ref Range Status   05/28/2012 17 10 - 34 UNIT/L Final     ALT   Date Value Ref Range Status   05/28/2012 24 10 - 44 UNIT/L Final     Anion Gap   Date Value Ref Range Status   05/28/2012 7 5 - 15 meq/L Final       Xrays        Impression/Plan    Problem List Items Addressed This Visit        Other    Abnormal CXR  - has been stable since at least 2007  - no further workup needed      Asbestos exposure  - probably minimal  - should get yearly CXR  - RTC 1 year                Humberto Gipson MD

## 2019-02-15 LAB
ALBUMIN SERPL-MCNC: 3.9 G/DL (ref 3.1–4.7)
ALP SERPL-CCNC: 58 IU/L (ref 40–104)
ALT (SGPT): 22 IU/L (ref 3–33)
AST SERPL-CCNC: 19 IU/L (ref 10–40)
BASOPHILS NFR BLD: 0.1 K/UL (ref 0–0.2)
BASOPHILS NFR BLD: 1.1 %
BILIRUB SERPL-MCNC: 0.9 MG/DL (ref 0.3–1)
BUN SERPL-MCNC: 14 MG/DL (ref 8–20)
CALCIUM SERPL-MCNC: 9 MG/DL (ref 7.7–10.4)
CHLORIDE: 105 MMOL/L (ref 98–110)
CO2 SERPL-SCNC: 29.6 MMOL/L (ref 22.8–31.6)
CREATININE RANDOM URINE: 151 MG/DL
CREATININE: 0.98 MG/DL (ref 0.6–1.4)
EOSINOPHIL NFR BLD: 0.3 K/UL (ref 0–0.7)
EOSINOPHIL NFR BLD: 4 %
ERYTHROCYTE [DISTWIDTH] IN BLOOD BY AUTOMATED COUNT: 13.3 % (ref 11.7–14.9)
GLUCOSE: 98 MG/DL (ref 70–99)
GRAN #: 3.6 K/UL (ref 1.4–6.5)
GRAN%: 54.6 %
HCT VFR BLD AUTO: 43.1 % (ref 39–55)
HGB BLD-MCNC: 13.9 G/DL (ref 14–16)
IMMATURE GRANS (ABS): 0 K/UL (ref 0–1)
IMMATURE GRANULOCYTES: 0.3 %
LYMPH #: 2 K/UL (ref 1.2–3.4)
LYMPH%: 31 %
MCH RBC QN AUTO: 29 PG (ref 25–35)
MCHC RBC AUTO-ENTMCNC: 32.3 G/DL (ref 31–36)
MCV RBC AUTO: 89.8 FL (ref 80–100)
MICROALBUM.,U,RANDOM: 3.7 MCG/ML (ref 0–19.9)
MICROALBUMIN/CREATININE RATIO: 2 (ref 0–30)
MONO #: 0.6 K/UL (ref 0.1–0.6)
MONO%: 9 %
NUCLEATED RBCS: 0 %
PLATELET # BLD AUTO: 176 K/UL (ref 140–440)
PMV BLD AUTO: 10.1 FL (ref 8.8–12.7)
POTASSIUM SERPL-SCNC: 4 MMOL/L (ref 3.5–5)
PROT SERPL-MCNC: 7.3 G/DL (ref 6–8.2)
RBC # BLD AUTO: 4.8 M/UL (ref 4.3–5.9)
SODIUM: 140 MMOL/L (ref 134–144)
WBC # BLD AUTO: 6.6 K/UL (ref 5–10)

## 2019-02-18 ENCOUNTER — TELEPHONE (OUTPATIENT)
Dept: FAMILY MEDICINE | Facility: CLINIC | Age: 84
End: 2019-02-18

## 2019-02-18 NOTE — TELEPHONE ENCOUNTER
----- Message from Beny Fay MD sent at 2/15/2019  1:07 PM CST -----  The results are within acceptable range.  Please keep regular follow up.

## 2019-02-19 ENCOUNTER — OFFICE VISIT (OUTPATIENT)
Dept: FAMILY MEDICINE | Facility: CLINIC | Age: 84
End: 2019-02-19
Payer: MEDICARE

## 2019-02-19 VITALS
BODY MASS INDEX: 25.16 KG/M2 | HEIGHT: 68 IN | SYSTOLIC BLOOD PRESSURE: 139 MMHG | DIASTOLIC BLOOD PRESSURE: 89 MMHG | HEART RATE: 64 BPM | WEIGHT: 166 LBS

## 2019-02-19 DIAGNOSIS — Z23 NEED FOR SHINGLES VACCINE: Primary | ICD-10-CM

## 2019-02-19 DIAGNOSIS — K21.9 GASTROESOPHAGEAL REFLUX DISEASE WITHOUT ESOPHAGITIS: ICD-10-CM

## 2019-02-19 DIAGNOSIS — E78.2 MIXED DYSLIPIDEMIA: ICD-10-CM

## 2019-02-19 DIAGNOSIS — F41.9 ANXIETY DISORDER, UNSPECIFIED TYPE: ICD-10-CM

## 2019-02-19 DIAGNOSIS — I10 ESSENTIAL HYPERTENSION: Primary | ICD-10-CM

## 2019-02-19 PROCEDURE — 3079F DIAST BP 80-89 MM HG: CPT | Mod: ,,, | Performed by: INTERNAL MEDICINE

## 2019-02-19 PROCEDURE — 1101F PR PT FALLS ASSESS DOC 0-1 FALLS W/OUT INJ PAST YR: ICD-10-PCS | Mod: ,,, | Performed by: INTERNAL MEDICINE

## 2019-02-19 PROCEDURE — 99213 OFFICE O/P EST LOW 20 MIN: CPT | Mod: ,,, | Performed by: INTERNAL MEDICINE

## 2019-02-19 PROCEDURE — 3079F PR MOST RECENT DIASTOLIC BLOOD PRESSURE 80-89 MM HG: ICD-10-PCS | Mod: ,,, | Performed by: INTERNAL MEDICINE

## 2019-02-19 PROCEDURE — 3075F PR MOST RECENT SYSTOLIC BLOOD PRESS GE 130-139MM HG: ICD-10-PCS | Mod: ,,, | Performed by: INTERNAL MEDICINE

## 2019-02-19 PROCEDURE — 1101F PT FALLS ASSESS-DOCD LE1/YR: CPT | Mod: ,,, | Performed by: INTERNAL MEDICINE

## 2019-02-19 PROCEDURE — 99213 PR OFFICE/OUTPT VISIT, EST, LEVL III, 20-29 MIN: ICD-10-PCS | Mod: ,,, | Performed by: INTERNAL MEDICINE

## 2019-02-19 PROCEDURE — 3075F SYST BP GE 130 - 139MM HG: CPT | Mod: ,,, | Performed by: INTERNAL MEDICINE

## 2019-02-19 NOTE — PATIENT INSTRUCTIONS
Tips to Control Acid Reflux    To control acid reflux, youll need to make some basic diet and lifestyle changes. The simple steps outlined below may be all youll need to ease discomfort.  Watch what you eat  · Avoid fatty foods and spicy foods.  · Eat fewer acidic foods, such as citrus and tomato-based foods. These can increase symptoms.  · Limit drinking alcohol, caffeine, and fizzy beverages. All increase acid reflux.  · Try limiting chocolate, peppermint, and spearmint. These can worsen acid reflux in some people.  Watch when you eat  · Avoid lying down for 3 hours after eating.  · Do not snack before going to bed.  Raise your head  Raising your head and upper body by 4 to 6 inches helps limit reflux when youre lying down. Put blocks under the head of your bed frame to raise it.  Other changes  · Lose weight, if you need to  · Dont exercise near bedtime  · Avoid tight-fitting clothes  · Limit aspirin and ibuprofen  · Stop smoking   Date Last Reviewed: 7/1/2016 © 2000-2017 [employer]. [address] [place] [address]. All rights reserved. This information is not intended as a substitute for professional medical care. Always follow your healthcare professional's instructions.        Anxiety Reaction  Anxiety is the feeling we all get when we think something bad might happen. It is a normal response to stress and usually causes only a mild reaction. When anxiety becomes more severe, it can interfere with daily life. In some cases, you may not even be aware of what it is youre anxious about. There may also be a genetic link or it may be a learned behavior in the home.  Both psychological and physical triggers cause stress reaction. It's often a response to fear or emotional stress, real or imagined. This stress may come from home, family, work, or social relationships.  During an anxiety reaction, you may feel:  · Helpless  · Nervous  · Depressed  · Irritable  Your body may show signs  of anxiety in many ways. You may experience:  · Dry mouth  · Shakiness  · Dizziness  · Weakness  · Trouble breathing  · Breathing fast (hyperventilating)  · Chest pressure  · Sweating  · Headache  · Nausea  · Diarrhea  · Tiredness  · Inability to sleep  · Sexual problems  Home care  · Try to locate the sources of stress in your life. They may not be obvious. These may include:  ¨ Daily hassles of life (traffic jams, missed appointments, car troubles, etc.)  ¨ Major life changes, both good (new baby, job promotion) and bad (loss of job, loss of loved one)  ¨ Overload: feeling that you have too many responsibilities and can't take care of all of them at once  ¨ Feeling helpless, feeling that your problems are beyond what youre able to solve  · Notice how your body reacts to stress. Learn to listen to your body signals. This will help you take action before the stress becomes severe.  · When you can, do something about the source of your stress. (Avoid hassles, limit the amount of change that happens in your life at one time and take a break when you feel overloaded).  · Unfortunately, many stressful situations can't be avoided. It is necessary to learn how to better manage stress. There are many proven methods that will reduce your anxiety. These include simple things like exercise, good nutrition and adequate rest. Also, there are certain techniques that are helpful:  ¨ Relaxation  ¨ Breathing exercises  ¨ Visualization  ¨ Biofeedback  ¨ Meditation  For more information about this, consult your doctor or go to a local bookstore and review the many books and tapes available on this subject.  Follow-up care  If you feel that your anxiety is not responding to self-help measures, contact your doctor or make an appointment with a counselor. You may need short-term psychological counseling and temporary medicine to help you manage stress.  Call 911  Call your healthcare provider right away if any of these  occur:  · Trouble breathing  · Confusion  · Drowsiness or trouble wakening  · Fainting or loss of consciousness  · Rapid heart rate  · Seizure  · New chest pain that becomes more severe, lasts longer, or spreads into your shoulder, arm, neck, jaw, or back  When to seek medical advice  Call your healthcare provider right away if any of these occur:  · Your symptoms get worse  · Severe headache not relieved by rest and mild pain reliever  Date Last Reviewed: 9/29/2015  © 8439-4511 "[employer]. [address] [place] [address], [place] [address]. All rights reserved. This information is not intended as a substitute for professional medical care. Always follow your healthcare professional's instructions.

## 2019-02-19 NOTE — PROGRESS NOTES
Subjective:       Patient ID: Bernardo Oliveira is a 83 y.o. male.    Chief Complaint: Hypertension (lab review ); Hyperlipidemia; and Gastroesophageal Reflux    Hypertension   This is a chronic problem. The current episode started more than 1 year ago. The problem is controlled. Pertinent negatives include no chest pain, neck pain, orthopnea, palpitations or shortness of breath. Risk factors for coronary artery disease include male gender and dyslipidemia. Past treatments include ACE inhibitors and beta blockers. The current treatment provides moderate improvement. There is no history of pheochromocytoma or renovascular disease.   Hyperlipidemia   This is a chronic problem. The current episode started more than 1 year ago. The problem is controlled. He has no history of obesity. Pertinent negatives include no chest pain or shortness of breath. Risk factors for coronary artery disease include male sex, hypertension and dyslipidemia.   Gastroesophageal Reflux   He complains of heartburn. He reports no abdominal pain, no chest pain or no coughing. This is a chronic problem. The current episode started more than 1 year ago. The problem has been waxing and waning. Fatigue: mild. He has tried a histamine-2 antagonist for the symptoms. The treatment provided moderate relief.   Sinus Problem   This is a chronic problem. The current episode started more than 1 year ago. The problem has been waxing and waning since onset. There has been no fever. Associated symptoms include congestion, sinus pressure and sneezing. Pertinent negatives include no coughing, neck pain or shortness of breath. Treatments tried: Zyrtec PRN.       Past Medical History:   Diagnosis Date    Anxiety     Cancer     right vocal cord with relapse.     Depression     GERD (gastroesophageal reflux disease)     Hyperlipidemia     Hypertension     Hypoglycemic coma     Pneumonia     Ulcer      Social History     Socioeconomic History    Marital  status:      Spouse name: Not on file    Number of children: Not on file    Years of education: Not on file    Highest education level: Not on file   Social Needs    Financial resource strain: Not on file    Food insecurity - worry: Not on file    Food insecurity - inability: Not on file    Transportation needs - medical: Not on file    Transportation needs - non-medical: Not on file   Occupational History    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   Tobacco Use    Smoking status: Former Smoker     Packs/day: 1.50     Last attempt to quit: 1982     Years since quittin.7    Smokeless tobacco: Never Used   Substance and Sexual Activity    Alcohol use: Yes     Comment: occ    Drug use: No    Sexual activity: No   Other Topics Concern    Not on file   Social History Narrative    Not on file     Past Surgical History:   Procedure Laterality Date    cataracts      dilcia    CHOLECYSTECTOMY      CIRCUMCISION      COLONOSCOPY N/A 2012    [facility]    EGD (ESOPHAGOGASTRODUODENOSCOPY) N/A 2012    [facility]    EYE SURGERY      retina detachment right    HERNIA REPAIR      x 2    THROAT SURGERY      X 2 for vocal cord cancer    TONSILLECTOMY      TRANSURETHRAL RESECTION OF PROSTATE      dr cindi ryan - [facility]     History reviewed. No pertinent family history.    Review of Systems   Constitutional: Negative for activity change, appetite change and unexpected weight change. Fatigue: mild.   HENT: Positive for congestion, hearing loss, sinus pressure and sneezing. Negative for trouble swallowing.    Eyes: Negative for pain and visual disturbance.   Respiratory: Negative for cough, chest tightness and shortness of breath.    Cardiovascular: Negative for chest pain, palpitations, orthopnea and leg swelling.   Gastrointestinal: Positive for heartburn. Negative for abdominal distention, abdominal  "pain, blood in stool, constipation and diarrhea.   Endocrine: Negative for cold intolerance, heat intolerance, polydipsia, polyphagia and polyuria.   Genitourinary: Negative for dysuria, hematuria and scrotal swelling.   Musculoskeletal: Negative for arthralgias, back pain, gait problem and neck pain.   Skin: Negative for pallor, rash and wound.   Allergic/Immunologic: Negative for environmental allergies, food allergies and immunocompromised state.   Neurological: Negative for dizziness, seizures, speech difficulty, light-headedness and numbness.   Hematological: Negative for adenopathy. Does not bruise/bleed easily.   Psychiatric/Behavioral: Negative for agitation, behavioral problems, confusion and decreased concentration. The patient is not nervous/anxious.          Objective:      Blood pressure 139/89, pulse 64, height 5' 8" (1.727 m), weight 75.3 kg (166 lb). Body mass index is 25.24 kg/m².  Physical Exam   Constitutional: He appears well-developed.   HENT:   Head: Normocephalic and atraumatic.   Right Ear: Decreased hearing is noted.   Left Ear: Decreased hearing is noted.   Nose: Nose normal.   Mouth/Throat: Oropharynx is clear and moist. No oropharyngeal exudate.   Hearing aid   Eyes: Conjunctivae and EOM are normal.   Neck: Normal range of motion. Neck supple. No JVD present. No tracheal deviation present. No thyromegaly present.   Cardiovascular: Normal rate, regular rhythm and normal heart sounds. Exam reveals no gallop and no friction rub.   No murmur heard.  Pulmonary/Chest: Effort normal and breath sounds normal. No respiratory distress. He has no wheezes. He has no rales.   Abdominal: Soft. Bowel sounds are normal. He exhibits no distension. There is no tenderness.   Neurological: He is alert. He has normal reflexes.   Skin: Skin is warm and dry.   Senile freckles.    Psychiatric: He has a normal mood and affect.   Nursing note and vitals reviewed.        Assessment:       1. Essential hypertension  "   2. Mixed dyslipidemia    3. Gastroesophageal reflux disease without esophagitis    4. Anxiety disorder, unspecified type           Orders Only on 02/15/2019   Component Date Value Ref Range Status    Microalbum.,U,Random 02/15/2019 3.7  0.0 - 19.9 mcg/ml Final    Creatinine, Random Ur 02/15/2019 151.00  mg/dl Final    Microalb Creat Ratio 02/15/2019 2  0 - 30 Final   Orders Only on 02/15/2019   Component Date Value Ref Range Status    WBC 02/15/2019 6.6  5.0 - 10.0 K/uL Final    RBC 02/15/2019 4.80  4.30 - 5.90 M/uL Final    Hemoglobin 02/15/2019 13.9* 14.0 - 16.0 g/dL Final    Hematocrit 02/15/2019 43.1  39.0 - 55.0 % Final    MCV 02/15/2019 89.8  80.0 - 100.0 fL Final    MCH 02/15/2019 29.0  25.0 - 35.0 pg Final    MCHC 02/15/2019 32.3  31.0 - 36.0 g/dL Final    RDW 02/15/2019 13.3  11.7 - 14.9 % Final    Platelets 02/15/2019 176  140 - 440 K/uL Final    MPV 02/15/2019 10.1  8.8 - 12.7 fL Final    Gran% 02/15/2019 54.6  % Final    Lymph% 02/15/2019 31.0  % Final    Mono% 02/15/2019 9.0  % Final    Eosinophil% 02/15/2019 4.0  % Final    Basophil% 02/15/2019 1.1  % Final    Gran # (ANC) 02/15/2019 3.6  1.4 - 6.5 K/uL Final    Lymph # 02/15/2019 2.0  1.2 - 3.4 K/uL Final    Mono # 02/15/2019 0.6  0.1 - 0.6 K/uL Final    Eos # 02/15/2019 0.3  0.0 - 0.7 K/uL Final    Baso # 02/15/2019 0.1  0.0 - 0.2 K/uL Final    Immature Grans (Abs) 02/15/2019 0.0  0.0 - 1.0 K/uL Final    Immature Granulocytes 02/15/2019 0.3  % Final    nRBC% 02/15/2019 0  % Final    Glucose 02/15/2019 98  70 - 99 mg/dL Final    BUN, Bld 02/15/2019 14  8 - 20 mg/dL Final    Creatinine 02/15/2019 0.98  0.60 - 1.40 mg/dL Final    Calcium 02/15/2019 9.0  7.7 - 10.4 mg/dL Final    Sodium 02/15/2019 140  134 - 144 mmol/L Final    Potassium 02/15/2019 4.0  3.5 - 5.0 mmol/L Final    Chloride 02/15/2019 105  98 - 110 mmol/L Final    CO2 02/15/2019 29.6  22.8 - 31.6 mmol/L Final    Albumin 02/15/2019 3.9  3.1 - 4.7 g/dL  Final    Total Bilirubin 02/15/2019 0.9  0.3 - 1.0 mg/dL Final    Alkaline Phosphatase 02/15/2019 58  40 - 104 IU/L Final    Total Protein 02/15/2019 7.3  6.0 - 8.2 g/dL Final    ALT (SGPT) 02/15/2019 22  3 - 33 IU/L Final    AST 02/15/2019 19  10 - 40 IU/L Final         Plan:           Essential hypertension    Mixed dyslipidemia    Gastroesophageal reflux disease without esophagitis    Anxiety disorder, unspecified type      Patient has been advised to watch diet and exercise. Avoid fried and fatty food. Compliance to medications and follow up urged.    The patient is asked to make an attempt to improve diet and exercise patterns to aid in medical management of this problem.    Advised Mr. Oliveira about age and season appropriate immunizations/ cancer screenings.  Also seasonal influenza vaccine, update on tetanus diphtheria vaccination every 10 years.          Current Outpatient Medications:     ALPRAZolam (XANAX) 0.25 MG tablet, Take 1 tablet (0.25 mg total) by mouth as needed for Anxiety., Disp: 30 tablet, Rfl: 1    atorvastatin (LIPITOR) 40 MG tablet, Take 1 tablet (40 mg total) by mouth once daily., Disp: 90 tablet, Rfl: 1    lisinopril 10 MG tablet, Take 1 tablet (10 mg total) by mouth 2 (two) times daily., Disp: 180 tablet, Rfl: 1    metoprolol succinate (TOPROL-XL) 25 MG 24 hr tablet, Take 1 tablet (25 mg total) by mouth once daily., Disp: 90 tablet, Rfl: 1    ranitidine (ZANTAC) 150 MG tablet, Take 1 tablet (150 mg total) by mouth daily as needed for Heartburn., Disp: 90 tablet, Rfl: 1

## 2019-05-07 ENCOUNTER — OFFICE VISIT (OUTPATIENT)
Dept: UROLOGY | Facility: CLINIC | Age: 84
End: 2019-05-07
Payer: MEDICARE

## 2019-05-07 ENCOUNTER — LAB VISIT (OUTPATIENT)
Dept: LAB | Facility: HOSPITAL | Age: 84
End: 2019-05-07
Attending: UROLOGY
Payer: MEDICARE

## 2019-05-07 VITALS
HEART RATE: 68 BPM | HEIGHT: 68 IN | WEIGHT: 162.69 LBS | SYSTOLIC BLOOD PRESSURE: 124 MMHG | BODY MASS INDEX: 24.66 KG/M2 | DIASTOLIC BLOOD PRESSURE: 72 MMHG

## 2019-05-07 DIAGNOSIS — R35.1 BENIGN PROSTATIC HYPERPLASIA WITH NOCTURIA: ICD-10-CM

## 2019-05-07 DIAGNOSIS — Z12.5 SCREENING FOR PROSTATE CANCER: Primary | ICD-10-CM

## 2019-05-07 DIAGNOSIS — N40.1 BENIGN PROSTATIC HYPERPLASIA WITH NOCTURIA: ICD-10-CM

## 2019-05-07 DIAGNOSIS — Z12.5 SCREENING FOR PROSTATE CANCER: ICD-10-CM

## 2019-05-07 LAB
BILIRUB SERPL-MCNC: NORMAL MG/DL
BLOOD URINE, POC: NORMAL
COLOR, POC UA: YELLOW
COMPLEXED PSA SERPL-MCNC: 1.5 NG/ML (ref 0–4)
GLUCOSE UR QL STRIP: NORMAL
KETONES UR QL STRIP: NORMAL
LEUKOCYTE ESTERASE URINE, POC: NORMAL
NITRITE, POC UA: NORMAL
PH, POC UA: 6
PROTEIN, POC: NORMAL
SPECIFIC GRAVITY, POC UA: 1.02
UROBILINOGEN, POC UA: NORMAL

## 2019-05-07 PROCEDURE — 99214 PR OFFICE/OUTPT VISIT, EST, LEVL IV, 30-39 MIN: ICD-10-PCS | Mod: 25,S$GLB,, | Performed by: UROLOGY

## 2019-05-07 PROCEDURE — 3078F PR MOST RECENT DIASTOLIC BLOOD PRESSURE < 80 MM HG: ICD-10-PCS | Mod: CPTII,S$GLB,, | Performed by: UROLOGY

## 2019-05-07 PROCEDURE — 1101F PR PT FALLS ASSESS DOC 0-1 FALLS W/OUT INJ PAST YR: ICD-10-PCS | Mod: CPTII,S$GLB,, | Performed by: UROLOGY

## 2019-05-07 PROCEDURE — 3078F DIAST BP <80 MM HG: CPT | Mod: CPTII,S$GLB,, | Performed by: UROLOGY

## 2019-05-07 PROCEDURE — 81002 POCT URINE DIPSTICK WITHOUT MICROSCOPE: ICD-10-PCS | Mod: S$GLB,,, | Performed by: UROLOGY

## 2019-05-07 PROCEDURE — 36415 COLL VENOUS BLD VENIPUNCTURE: CPT | Mod: PO

## 2019-05-07 PROCEDURE — 99214 OFFICE O/P EST MOD 30 MIN: CPT | Mod: 25,S$GLB,, | Performed by: UROLOGY

## 2019-05-07 PROCEDURE — 3074F PR MOST RECENT SYSTOLIC BLOOD PRESSURE < 130 MM HG: ICD-10-PCS | Mod: CPTII,S$GLB,, | Performed by: UROLOGY

## 2019-05-07 PROCEDURE — 84153 ASSAY OF PSA TOTAL: CPT

## 2019-05-07 PROCEDURE — 1101F PT FALLS ASSESS-DOCD LE1/YR: CPT | Mod: CPTII,S$GLB,, | Performed by: UROLOGY

## 2019-05-07 PROCEDURE — 99999 PR PBB SHADOW E&M-EST. PATIENT-LVL III: CPT | Mod: PBBFAC,,, | Performed by: UROLOGY

## 2019-05-07 PROCEDURE — 81002 URINALYSIS NONAUTO W/O SCOPE: CPT | Mod: S$GLB,,, | Performed by: UROLOGY

## 2019-05-07 PROCEDURE — 99999 PR PBB SHADOW E&M-EST. PATIENT-LVL III: ICD-10-PCS | Mod: PBBFAC,,, | Performed by: UROLOGY

## 2019-05-07 PROCEDURE — 3074F SYST BP LT 130 MM HG: CPT | Mod: CPTII,S$GLB,, | Performed by: UROLOGY

## 2019-05-07 RX ORDER — AMOXICILLIN 500 MG
CAPSULE ORAL DAILY
COMMUNITY
End: 2019-06-21

## 2019-05-07 NOTE — PROGRESS NOTES
UROLOGY [place]  5 7 19    Cc bph    Age 83, is here to be checked for his prostate. He is urinating well, with no complaints. Takes no medications for his prostate. Nocturia x 2. No burning on urination and no pains..     Had turp by dr ryan at [facility] 16 years ago; did fine.          PMH:   SURGICAL: Tonsillectomy, TURP, hernia repair, eye surgery x4.   MEDICAL: Peptic ulcers, high cholesterol.   FAMILIAL: No family history of prostate cancer.   SOCIAL: The patient lives in [place], retired, born in [place] but was raised in [place], so speaks very little [place];  2008 in [facility], nonsmoker.   ALLERGIES: NKDA.                  Current Outpatient Prescriptions on File Prior to Visit   Medication Sig Dispense Refill    aspirin (ECOTRIN) 81 MG EC tablet Take 1 tablet by mouth as needed.         atorvastatin (LIPITOR)  Take 1 tablet by mouth.        ibuprofen (ADVIL,MOTRIN) 400 MG tablet Take 1 tablet (400 mg total) by mouth every 6 (six) vilma 20 tablet 0    metoprolol succinate (TOPROL-XL) 25 MG 24 hr ta 1 tablet Daily.                   ROS:  Denies malaise, rare headaches, needs glasses to see, no difficulty swallowing or breathing problems.   No chest pains or palpitations.   No change in bowel habits, no tarry stools or hematochezia. Has acid reflux.  No genital lesions.  No bleeding disorders, no seizures.  Psych normal mood, normal affect        Pt alert, oriented, no distress  HEENT: wnl.  Neck: supple, no JVD, no lymphadenopathy  Chest: CV NSR  Lungs: normal chest expansion  ABDOMEN: Flat. No masses. CVAs are negative. No organomegaly or   tenderness.   Penis circumcised. Meatus normal. Testes normal.   Prostate 30 gm, symmetric, benign.   Extremities: no edema, peripheral pulses nl  Neuro: preserved     PSA 1.4  one year ago     IMPRESSION:   bph s/p turp, now on observation  Nocturia  PLAN: RTC 1 year. I will update the psa today.

## 2019-05-15 ENCOUNTER — TELEPHONE (OUTPATIENT)
Dept: UROLOGY | Facility: CLINIC | Age: 84
End: 2019-05-15

## 2019-05-15 NOTE — TELEPHONE ENCOUNTER
----- Message from Lia Velasquez sent at 5/15/2019  9:28 AM CDT -----  Contact: wife Yana Oliveira   Type:  Test Results    Who Called:  Wife Yana   Name of Test (Lab/Mammo/Etc):    Date of Test:  05/07  Ordering Provider:  Jesusita   Where the test was performed: [address] [facility] [address] [place]   Best Call Back Number:  [phone] (home) or leave voicemail     Additional Information:

## 2019-06-04 ENCOUNTER — TELEPHONE (OUTPATIENT)
Dept: UROLOGY | Facility: CLINIC | Age: 84
End: 2019-06-04

## 2019-06-04 NOTE — TELEPHONE ENCOUNTER
----- Message from Irais Fonseca sent at 6/4/2019  9:45 AM CDT -----  Contact: Yana wife  Type:  Test Results    Who Called:  Jesseewilli  Name of Test (Lab/Mammo/Etc):  PSA  Date of Test:  n/a  Ordering Provider:  Jesusita  Where the test was performed:  [facility]  Best Call Back Number:  [phone]  Additional Information:  Pls call to adv of test results. Pls leave a msg if no answer

## 2019-06-06 ENCOUNTER — OFFICE VISIT (OUTPATIENT)
Dept: SURGERY | Facility: CLINIC | Age: 84
End: 2019-06-06
Payer: MEDICARE

## 2019-06-06 VITALS
SYSTOLIC BLOOD PRESSURE: 179 MMHG | HEART RATE: 66 BPM | WEIGHT: 162 LBS | TEMPERATURE: 98 F | HEIGHT: 68 IN | DIASTOLIC BLOOD PRESSURE: 78 MMHG | BODY MASS INDEX: 24.55 KG/M2

## 2019-06-06 DIAGNOSIS — R10.31 RIGHT GROIN PAIN: Primary | ICD-10-CM

## 2019-06-06 PROCEDURE — 99203 PR OFFICE/OUTPT VISIT, NEW, LEVL III, 30-44 MIN: ICD-10-PCS | Mod: ,,, | Performed by: SURGERY

## 2019-06-06 PROCEDURE — 3077F PR MOST RECENT SYSTOLIC BLOOD PRESSURE >= 140 MM HG: ICD-10-PCS | Mod: ,,, | Performed by: SURGERY

## 2019-06-06 PROCEDURE — 3078F PR MOST RECENT DIASTOLIC BLOOD PRESSURE < 80 MM HG: ICD-10-PCS | Mod: ,,, | Performed by: SURGERY

## 2019-06-06 PROCEDURE — 99203 OFFICE O/P NEW LOW 30 MIN: CPT | Mod: ,,, | Performed by: SURGERY

## 2019-06-06 PROCEDURE — 3078F DIAST BP <80 MM HG: CPT | Mod: ,,, | Performed by: SURGERY

## 2019-06-06 PROCEDURE — 3077F SYST BP >= 140 MM HG: CPT | Mod: ,,, | Performed by: SURGERY

## 2019-06-06 PROCEDURE — 1101F PR PT FALLS ASSESS DOC 0-1 FALLS W/OUT INJ PAST YR: ICD-10-PCS | Mod: ,,, | Performed by: SURGERY

## 2019-06-06 PROCEDURE — 1101F PT FALLS ASSESS-DOCD LE1/YR: CPT | Mod: ,,, | Performed by: SURGERY

## 2019-06-06 NOTE — PROGRESS NOTES
Subjective:       Patient ID: Bernardo Oliveira is a 83 y.o. male.    Chief Complaint: Consult (Referred by Dr. Fernandez to eval right inguinal hernia )      HPI:  Patient presents for evaluation of possible right inguinal hernia. Complaints is that of vague discomfort in the right groin intermittently. I see patient for similar problem 2015. No evidence of pathology was found at that time. Patient's hernia repair was over 20 years ago. Mesh was used. Patient has no systemic symptoms. He is not having symptoms during the interview. This is referral from his cardiologist.    Past Medical History:   Diagnosis Date    Anxiety     Depression     GERD (gastroesophageal reflux disease)     Hyperlipidemia     Hypertension     Vocal cord cancer      Past Surgical History:   Procedure Laterality Date    CATARACT EXTRACTION, BILATERAL      CHOLECYSTECTOMY      CIRCUMCISION      COLONOSCOPY N/A 6/5/2012    [facility]    EGD (ESOPHAGOGASTRODUODENOSCOPY) N/A 6/5/2012    [facility]    INGUINAL HERNIA REPAIR Right 2000    INGUINAL HERNIA REPAIR Left 1995    RETINAL DETACHMENT SURGERY      THROAT SURGERY      X 2 for vocal cord cancer    TONSILLECTOMY      TRANSURETHRAL RESECTION OF PROSTATE  2004    dr cindi ryan - [facility]     Review of patient's allergies indicates:   Allergen Reactions    Codeine      Medication List with Changes/Refills   Current Medications    ALPRAZOLAM (XANAX) 0.25 MG TABLET    Take 1 tablet (0.25 mg total) by mouth as needed for Anxiety.    ATORVASTATIN (LIPITOR) 40 MG TABLET    Take 1 tablet (40 mg total) by mouth once daily.    FISH OIL-OMEGA-3 FATTY ACIDS 300-1,000 MG CAPSULE    Take by mouth once daily.    LISINOPRIL 10 MG TABLET    Take 1 tablet (10 mg total) by mouth 2 (two) times daily.    METOPROLOL SUCCINATE (TOPROL-XL) 25 MG 24 HR TABLET    Take 1 tablet (25 mg total) by mouth once daily.    RANITIDINE  (ZANTAC) 150 MG TABLET    Take 1 tablet (150 mg total) by mouth daily as needed for Heartburn.     History reviewed. No pertinent family history.  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   Social Needs    Financial resource strain: Not on file    Food insecurity:     Worry: Not on file     Inability: Not on file    Transportation needs:     Medical: Not on file     Non-medical: Not on file   Tobacco Use    Smoking status: Former Smoker     Packs/day: 1.50     Last attempt to quit: 1982     Years since quittin.0    Smokeless tobacco: Never Used   Substance and Sexual Activity    Alcohol use: Yes     Comment: occ    Drug use: No    Sexual activity: Never   Lifestyle    Physical activity:     Days per week: Not on file     Minutes per session: Not on file    Stress: Not on file   Relationships    Social connections:     Talks on phone: Not on file     Gets together: Not on file     Attends [employer] service: Not on file     Active member of club or organization: Not on file     Attends meetings of clubs or organizations: Not on file     Relationship status: Not on file   Other Topics Concern    Not on file   Social History Narrative    Not on file         Review of Systems   Constitutional: Negative for appetite change, chills, fever and unexpected weight change.   HENT: Negative for hearing loss, rhinorrhea, sore throat and voice change.    Eyes: Negative for photophobia and visual disturbance.   Respiratory: Negative for cough, choking and shortness of breath.    Cardiovascular: Negative for chest pain, palpitations and leg swelling.   Gastrointestinal: Negative for abdominal pain, blood in stool, constipation, diarrhea, nausea and vomiting.   Endocrine:  Negative for cold intolerance, heat intolerance, polydipsia and polyuria.   Musculoskeletal: Negative for arthralgias, back pain, joint swelling and neck stiffness.   Skin: Negative for color change, pallor and rash.   Neurological: Negative for dizziness, seizures, syncope and headaches.   Hematological: Negative for adenopathy. Does not bruise/bleed easily.   Psychiatric/Behavioral: Negative for agitation, behavioral problems and confusion.       Objective:      Physical Exam   Constitutional: He appears well-developed and well-nourished.  Non-toxic appearance. No distress.   HENT:   Head: Normocephalic and atraumatic. Head is without abrasion and without laceration.   Right Ear: External ear normal.   Left Ear: External ear normal.   Nose: Nose normal.   Mouth/Throat: Oropharynx is clear and moist.   Eyes: Pupils are equal, round, and reactive to light. EOM are normal.   Neck: Trachea normal. No tracheal deviation and normal range of motion present. No thyroid mass and no thyromegaly present.   Cardiovascular: Normal rate and regular rhythm.   Pulmonary/Chest: Effort normal. No accessory muscle usage. No tachypnea. No respiratory distress.   Abdominal: Soft. Normal appearance and bowel sounds are normal. He exhibits no distension and no mass. There is no hepatosplenomegaly. There is no tenderness. There is no tenderness at McBurney's point and negative Shelton's sign. No hernia.   Lymphadenopathy:     He has no cervical adenopathy.     He has no axillary adenopathy.        Right: No inguinal adenopathy present.        Left: No inguinal adenopathy present.   Neurological: He is alert. Coordination and gait normal.   Skin: Skin is warm and intact.   Psychiatric: He has a normal mood and affect. His speech is normal and behavior is normal.       Assessment/Plan:   Right groin pain        No hernias felt on physical exam. Could not reproduce patient's pain on today's exam. No surgical intervention recommended at this  time. In any case patient is a poor operative candidate.      Follow up if symptoms worsen or fail to improve.

## 2019-06-06 NOTE — LETTER
June 6, 2019      Elliott Fernandez MD  [address]  [address]  [place] [address]           [facility]  [address]  [place] [address]  Phone: [phone]  Fax: [phone]          Patient: Bernardo Oliveira   MR Number: [id number]   YOB: 1935   Date of Visit: 6/6/2019       Dear Dr. Elliott Fernnadez:    Thank you for referring Bernardo Oliveiar to me for evaluation. Attached you will find relevant portions of my assessment and plan of care.    If you have questions, please do not hesitate to call me. I look forward to following Bernardo Oliveira along with you.    Sincerely,    Alan TAYLOR MD    Enclosure  CC:  No Recipients    If you would like to receive this communication electronically, please contact [email or web address] or [phone] to request more information on [employer] access.    For providers and/or their staff who would like to refer a patient to [facility], please contact us through our one-stop-shop provider referral line, [facility], at [phone].    If you feel you have received this communication in error or would no longer like to receive these types of communications, please e-mail [email or web address]

## 2019-06-10 RX ORDER — LISINOPRIL 10 MG/1
TABLET ORAL
Qty: 180 TABLET | Refills: 1 | Status: SHIPPED | OUTPATIENT
Start: 2019-06-10 | End: 2019-12-16 | Stop reason: SDUPTHER

## 2019-06-21 ENCOUNTER — OFFICE VISIT (OUTPATIENT)
Dept: FAMILY MEDICINE | Facility: CLINIC | Age: 84
End: 2019-06-21
Payer: MEDICARE

## 2019-06-21 VITALS
BODY MASS INDEX: 24.31 KG/M2 | DIASTOLIC BLOOD PRESSURE: 72 MMHG | TEMPERATURE: 98 F | SYSTOLIC BLOOD PRESSURE: 126 MMHG | OXYGEN SATURATION: 98 % | HEART RATE: 74 BPM | HEIGHT: 68 IN | WEIGHT: 160.38 LBS

## 2019-06-21 DIAGNOSIS — H66.003 ACUTE SUPPURATIVE OTITIS MEDIA OF BOTH EARS WITHOUT SPONTANEOUS RUPTURE OF TYMPANIC MEMBRANES, RECURRENCE NOT SPECIFIED: ICD-10-CM

## 2019-06-21 DIAGNOSIS — R09.82 POST-NASAL DRAINAGE: ICD-10-CM

## 2019-06-21 DIAGNOSIS — J01.00 ACUTE MAXILLARY SINUSITIS, RECURRENCE NOT SPECIFIED: Primary | ICD-10-CM

## 2019-06-21 DIAGNOSIS — J30.89 ALLERGIC RHINITIS DUE TO OTHER ALLERGIC TRIGGER, UNSPECIFIED SEASONALITY: ICD-10-CM

## 2019-06-21 DIAGNOSIS — R05.9 COUGH: ICD-10-CM

## 2019-06-21 PROCEDURE — 1101F PT FALLS ASSESS-DOCD LE1/YR: CPT | Mod: ,,, | Performed by: NURSE PRACTITIONER

## 2019-06-21 PROCEDURE — 99213 PR OFFICE/OUTPT VISIT, EST, LEVL III, 20-29 MIN: ICD-10-PCS | Mod: ,,, | Performed by: NURSE PRACTITIONER

## 2019-06-21 PROCEDURE — 3074F SYST BP LT 130 MM HG: CPT | Mod: ,,, | Performed by: NURSE PRACTITIONER

## 2019-06-21 PROCEDURE — 3078F DIAST BP <80 MM HG: CPT | Mod: ,,, | Performed by: NURSE PRACTITIONER

## 2019-06-21 PROCEDURE — 99213 OFFICE O/P EST LOW 20 MIN: CPT | Mod: ,,, | Performed by: NURSE PRACTITIONER

## 2019-06-21 PROCEDURE — 3074F PR MOST RECENT SYSTOLIC BLOOD PRESSURE < 130 MM HG: ICD-10-PCS | Mod: ,,, | Performed by: NURSE PRACTITIONER

## 2019-06-21 PROCEDURE — 3078F PR MOST RECENT DIASTOLIC BLOOD PRESSURE < 80 MM HG: ICD-10-PCS | Mod: ,,, | Performed by: NURSE PRACTITIONER

## 2019-06-21 PROCEDURE — 1101F PR PT FALLS ASSESS DOC 0-1 FALLS W/OUT INJ PAST YR: ICD-10-PCS | Mod: ,,, | Performed by: NURSE PRACTITIONER

## 2019-06-21 RX ORDER — BENZONATATE 100 MG/1
200 CAPSULE ORAL 3 TIMES DAILY PRN
Qty: 60 CAPSULE | Refills: 0 | Status: SHIPPED | OUTPATIENT
Start: 2019-06-21 | End: 2019-07-01

## 2019-06-21 RX ORDER — AMOXICILLIN AND CLAVULANATE POTASSIUM 875; 125 MG/1; MG/1
1 TABLET, FILM COATED ORAL 2 TIMES DAILY
Qty: 20 TABLET | Refills: 0 | Status: SHIPPED | OUTPATIENT
Start: 2019-06-21 | End: 2019-07-31

## 2019-06-21 RX ORDER — AZELASTINE 1 MG/ML
1 SPRAY, METERED NASAL 2 TIMES DAILY
Qty: 30 ML | Refills: 1 | Status: SHIPPED | OUTPATIENT
Start: 2019-06-21 | End: 2019-07-31 | Stop reason: SDUPTHER

## 2019-06-21 NOTE — PROGRESS NOTES
Subjective:       Patient ID: Bernardo Oliveira is a 83 y.o. male.    Chief Complaint: Headache; Sinus Problem; and Fatigue    Sinus Problem   This is a new problem. The current episode started 1 to 4 weeks ago. The problem has been waxing and waning since onset. There has been no fever. The pain is moderate. Associated symptoms include congestion, coughing, ear pain, headaches, a hoarse voice, sinus pressure, sneezing and swollen glands. Pertinent negatives include no chills, diaphoresis, shortness of breath or sore throat. Past treatments include lying down, sitting up and saline sprays. The treatment provided no relief.   Cough   This is a new problem. The current episode started 1 to 4 weeks ago. The problem has been waxing and waning. The problem occurs every few minutes. The cough is non-productive. Associated symptoms include ear congestion, ear pain, headaches, nasal congestion, postnasal drip and rhinorrhea. Pertinent negatives include no chest pain, chills, fever, rash, sore throat, shortness of breath or wheezing. The symptoms are aggravated by lying down. He has tried rest (saline nasal sprays) for the symptoms. The treatment provided mild relief.     Review of Systems   Constitutional: Positive for activity change and fatigue. Negative for appetite change, chills, diaphoresis, fever and unexpected weight change.   HENT: Positive for congestion, ear pain, hoarse voice, postnasal drip, rhinorrhea, sinus pressure and sneezing. Negative for ear discharge, hearing loss, sore throat, trouble swallowing and voice change.    Respiratory: Positive for cough. Negative for chest tightness, shortness of breath and wheezing.    Cardiovascular: Negative for chest pain, palpitations and leg swelling.   Gastrointestinal: Negative for abdominal pain and nausea.   Endocrine: Negative for cold intolerance and heat intolerance.   Skin: Negative for color change and rash.   Neurological: Positive for headaches. Negative for  dizziness and weakness.   Hematological: Negative for adenopathy.   Psychiatric/Behavioral: Negative for agitation, confusion, self-injury and suicidal ideas.       Past Medical History:   Diagnosis Date    Anxiety     Depression     GERD (gastroesophageal reflux disease)     Hyperlipidemia     Hypertension     Vocal cord cancer       Past Surgical History:   Procedure Laterality Date    CATARACT EXTRACTION, BILATERAL      CHOLECYSTECTOMY      CIRCUMCISION      COLONOSCOPY N/A 2012    Performed by [facility] at [facility]    EGD (ESOPHAGOGASTRODUODENOSCOPY) N/A 2012    Performed by [facility] at [facility]    INGUINAL HERNIA REPAIR Right     INGUINAL HERNIA REPAIR Left     RETINAL DETACHMENT SURGERY      THROAT SURGERY      X 2 for vocal cord cancer    TONSILLECTOMY      TRANSURETHRAL RESECTION OF PROSTATE      dr cindi ryan - [facility]       History reviewed. No pertinent family history.    Social History     Socioeconomic History    Marital status:      Spouse name: Not on file    Number of children: Not on file    Years of education: Not on file    Highest education level: Not on file   Occupational History    Occupation: Retired - [employer]   Social Needs    Financial resource strain: Not on file    Food insecurity:     Worry: Not on file     Inability: Not on file    Transportation needs:     Medical: Not on file     Non-medical: Not on file   Tobacco Use    Smoking status: Former Smoker     Packs/day: 1.50     Last attempt to quit: 1982     Years since quittin.0    Smokeless tobacco: Never Used   Substance and Sexual Activity    Alcohol use: Not Currently    Drug use: No    Sexual activity: Never   Lifestyle    Physical activity:     Days per week: Not on file     Minutes per session: Not on file    Stress: Not on file   Relationships    Social connections:     Talks on phone: Not on file     Gets together:  "Not on file     Attends Hinduism service: Not on file     Active member of club or organization: Not on file     Attends meetings of clubs or organizations: Not on file     Relationship status: Not on file   Other Topics Concern    Not on file   Social History Narrative    Not on file       Current Outpatient Medications   Medication Sig Dispense Refill    ALPRAZolam (XANAX) 0.25 MG tablet Take 1 tablet (0.25 mg total) by mouth as needed for Anxiety. 30 tablet 1    atorvastatin (LIPITOR) 40 MG tablet Take 1 tablet (40 mg total) by mouth once daily. 90 tablet 1    lisinopril 10 MG tablet TAKE 1 TABLET TWICE DAILY 180 tablet 1    metoprolol succinate (TOPROL-XL) 25 MG 24 hr tablet Take 1 tablet (25 mg total) by mouth once daily. 90 tablet 1    ranitidine (ZANTAC) 150 MG tablet Take 1 tablet (150 mg total) by mouth daily as needed for Heartburn. 90 tablet 1    amoxicillin-clavulanate 875-125mg (AUGMENTIN) 875-125 mg per tablet Take 1 tablet by mouth 2 (two) times daily. 20 tablet 0    azelastine (ASTELIN) 137 mcg (0.1 %) nasal spray 1 spray (137 mcg total) by Nasal route 2 (two) times daily. 30 mL 1    benzonatate (TESSALON) 100 MG capsule Take 2 capsules (200 mg total) by mouth 3 (three) times daily as needed. 60 capsule 0     No current facility-administered medications for this visit.        Review of patient's allergies indicates:   Allergen Reactions    Codeine      Objective:      Blood pressure 126/72, pulse 74, temperature 98.4 °F (36.9 °C), temperature source Oral, height 5' 8" (1.727 m), weight 72.8 kg (160 lb 6.4 oz), SpO2 98 %. Body mass index is 24.39 kg/m².   Physical Exam   Constitutional: He is oriented to person, place, and time. He appears well-developed.   HENT:   Head: Normocephalic and atraumatic.   Right Ear: Ear canal normal. Tympanic membrane is erythematous and bulging. A middle ear effusion is present.   Left Ear: Ear canal normal. Tympanic membrane is erythematous and bulging. A " middle ear effusion is present.   Nose: Mucosal edema and rhinorrhea present. Right sinus exhibits no maxillary sinus tenderness and no frontal sinus tenderness. Left sinus exhibits maxillary sinus tenderness. Left sinus exhibits no frontal sinus tenderness.   Mouth/Throat: Uvula is midline and mucous membranes are normal. Oropharyngeal exudate (post nasal drainage noted) present. No posterior oropharyngeal edema, posterior oropharyngeal erythema or tonsillar abscesses.   Eyes: Pupils are equal, round, and reactive to light. Conjunctivae, EOM and lids are normal.   Neck: Normal range of motion. Neck supple.   Cardiovascular: Normal rate, regular rhythm, S1 normal, S2 normal, normal heart sounds and normal pulses.   No murmur heard.  Pulmonary/Chest: Effort normal and breath sounds normal. No respiratory distress. He has no wheezes.   Lymphadenopathy:     He has cervical adenopathy.        Right cervical: Superficial cervical adenopathy present.        Left cervical: Superficial cervical adenopathy present.   Neurological: He is alert and oriented to person, place, and time.   Skin: Skin is warm and dry. No rash noted.   Psychiatric: He has a normal mood and affect. His speech is normal and behavior is normal.   Nursing note and vitals reviewed.          Assessment:       1. Acute maxillary sinusitis, recurrence not specified    2. Allergic rhinitis due to other allergic trigger, unspecified seasonality    3. Acute suppurative otitis media of both ears without spontaneous rupture of tympanic membranes, recurrence not specified    4. Post-nasal drainage    5. Cough        Plan:       Bernardo was seen today for headache, sinus problem and fatigue.    Diagnoses and all orders for this visit:    Acute maxillary sinusitis, recurrence not specified  -     amoxicillin-clavulanate 875-125mg (AUGMENTIN) 875-125 mg per tablet; Take 1 tablet by mouth 2 (two) times daily.    Allergic rhinitis due to other allergic trigger,  unspecified seasonality  -     azelastine (ASTELIN) 137 mcg (0.1 %) nasal spray; 1 spray (137 mcg total) by Nasal route 2 (two) times daily.    Acute suppurative otitis media of both ears without spontaneous rupture of tympanic membranes, recurrence not specified  -     amoxicillin-clavulanate 875-125mg (AUGMENTIN) 875-125 mg per tablet; Take 1 tablet by mouth 2 (two) times daily.    Post-nasal drainage  -     azelastine (ASTELIN) 137 mcg (0.1 %) nasal spray; 1 spray (137 mcg total) by Nasal route 2 (two) times daily.    Cough  -     benzonatate (TESSALON) 100 MG capsule; Take 2 capsules (200 mg total) by mouth 3 (three) times daily as needed.

## 2019-06-21 NOTE — PATIENT INSTRUCTIONS
Allergic Rhinitis  Allergic rhinitis is an allergic reaction that affects the nose, and often the eyes. Its often known as nasal allergies. Nasal allergies are often due to things in the environment that are breathed in. Depending what you are sensitive to, nasal allergies may occur only during certain seasons. Or they may occur year round. Common indoor allergens include house dust mites, mold, cockroaches, and pet dander. Outdoor allergens include pollen from trees, grasses, and weeds.   Symptoms include a drippy, stuffy, and itchy nose. They also include sneezing and red and itchy eyes. You may feel tired more often. Severe allergies may also affect your breathing and trigger a condition called asthma.   Tests can be done to see what allergens are affecting you. You may be referred to an allergy specialist for testing and further evaluation.  Home care  Your healthcare provider may prescribe medicines to help relieve allergy symptoms. These may include oral medicines, nasal sprays, or eye drops.  Ask your provider for advice on how to avoid substances that you are allergic to. Below are a few tips for each type of allergen.  Pet dander:  · Do not have pets with fur and feathers.  · If you can't avoid having a pet, keep it out of your bedroom and off upholstered furniture.  Pollen:  · When pollen counts are high, keep windows of your car and home closed. If possible, use an air conditioner instead.  · Wear a filter mask when mowing or doing yard work.  House dust mites:  · Wash bedding every week in warm water and detergent and dry on a hot setting.  · Cover the mattress, box spring, and pillows with allergy covers.   · If possible, sleep in a room with no carpet, curtains, or upholstered furniture.  Cockroaches:  · Store food in sealed containers.  · Remove garbage from the home promptly.  · Fix water leaks  Mold:  · Keep humidity low by using a dehumidifier or air conditioner. Keep the dehumidifier and air  conditioner clean and free of mold.  · Clean moldy areas with bleach and water.  In general:  · Vacuum once or twice a week. If possible, use a vacuum with a high-efficiency particulate air (HEPA) filter.  · Do not smoke. Avoid cigarette smoke. Cigarette smoke is an irritant that can make symptoms worse.  Follow-up care  Follow up as advised by the healthcare provider or our staff. If you were referred to an allergy specialist, make this appointment promptly.  When to seek medical advice  Call your healthcare provider right away if the following occur:  · Coughing or wheezing  · Fever greater than 100.4°F (38°C)  · Hives (raised red bumps)  · Continuing symptoms, new symptoms, or worsening symptoms  Call 911 right away if you have:  · Trouble breathing  · Severe swelling of the face or severe itching of the eyes or mouth  Date Last Reviewed: 3/1/2017  © 9412-8992 [employer]. [address] [place] [address]. All rights reserved. This information is not intended as a substitute for professional medical care. Always follow your healthcare professional's instructions.        Understanding Middle Ear Infections in Children    Middle ear infections are most common in children under age 5. Crankiness, a fever, and tugging at or rubbing the ear may all be signs that your child has a middle ear infection. This is especially true if your child has a cold or other viral illness. It's important to call your healthcare provider if you see these or any of the signs listed below.  Call your child's healthcare provider if you notice any signs of a middle ear infection.   What are middle ear infections?  Middle ear infections occur behind the eardrum. The eardrum is the thin sheet of tissue that passes sound waves between the outer and middle ear. These infections are usually caused by bacteria or viruses. These are often related to a recent cold or allergy problem.  A blocked tube  In young children, these  bacteria or viruses likely reach the middle ear by traveling the short length of the eustachian tube from the back of the nose. Once in the middle ear, they multiply and spread. This irritates delicate tissues lining the middle ear and eustachian tube. If the tube lining swells enough to block off the tube, air pressure drops in the middle ear. This pulls the eardrum inward, making it stiffer and less able to transmit sound.  Fluid buildup causes pain  Once the eustachian tube swells shut, moisture cant drain from the middle ear. Fluid that should flush out the infection builds up in the chamber. This may raise pressure behind the eardrum. This can decrease pain slightly. But if the infection spreads to this fluid, pressure behind the eardrum goes way up. The eardrum is forced outward. It becomes painful, and may break.  Chronic fluid affects hearing  If the eardrum doesnt break and the tube remains blocked, the fluid becomes an ongoing (chronic) condition. As the immediate (acute) infection passes, the middle ear fluid thickens. It becomes sticky and takes up less space. Pressure drops in the middle ear once more. Inward suction stiffens the eardrum. This affects hearing. If the fluid is not removed, the eardrum may be stretched and damaged.  Signs of middle ear problems  · A fever over 100.4°F (38.0°C) and cold symptoms  · Severe ear pain  · Any kind of discharge from the ear  · Ear pain that gets worse or doesnt go away after a few days   When to call your child's healthcare provider  Call your child's healthcare provider's office if your otherwise healthy child has any of the signs or symptoms described below:  · Fever (see Fever and children, below)  · Your child has had a seizure caused by the fever  · Rapid breathing or shortness of breath  · A stiff neck or headache  · Trouble swallowing  · Your child acts ill after the fever is gone  · Persistent brown, green, or bloody mucus  · Signs of dehydration.  These include severe thirst, dark yellow urine, infrequent urination, dull or sunken eyes, dry skin, and dry or cracked lips.  · Your child still doesn't look or act right to you, even after taking a non-aspirin pain reliever  Fever and children  Always use a digital thermometer to check your childs temperature. Never use a mercury thermometer.  For infants and toddlers, be sure to use a rectal thermometer correctly. A rectal thermometer may accidentally poke a hole in (perforate) the rectum. It may also pass on germs from the stool. Always follow the product makers directions for proper use. If you dont feel comfortable taking a rectal temperature, use another method. When you talk to your childs healthcare provider, tell him or her which method you used to take your childs temperature.  Here are guidelines for fever temperature. Ear temperatures arent accurate before 6 months of age. Dont take an oral temperature until your child is at least 4 years old.  Infant under 3 months old:  · Ask your childs healthcare provider how you should take the temperature.  · Rectal or forehead (temporal artery) temperature of 100.4°F (38°C) or higher, or as directed by the provider  · Armpit temperature of 99°F (37.2°C) or higher, or as directed by the provider  Child age 3 to 36 months:  · Rectal, forehead (temporal artery), or ear temperature of 102°F (38.9°C) or higher, or as directed by the provider  · Armpit temperature of 101°F (38.3°C) or higher, or as directed by the provider  Child of any age:  · Repeated temperature of 104°F (40°C) or higher, or as directed by the provider  · Fever that lasts more than 24 hours in a child under 2 years old. Or a fever that lasts for 3 days in a child 2 years or older.   Date Last Reviewed: 11/1/2016 © 2000-2017 [employer]. [address] [place] [address], [place] [address]. All rights reserved. This information is not intended as a substitute for professional medical  care. Always follow your healthcare professional's instructions.

## 2019-07-31 ENCOUNTER — OFFICE VISIT (OUTPATIENT)
Dept: FAMILY MEDICINE | Facility: CLINIC | Age: 84
End: 2019-07-31
Payer: MEDICARE

## 2019-07-31 VITALS
HEART RATE: 72 BPM | DIASTOLIC BLOOD PRESSURE: 65 MMHG | SYSTOLIC BLOOD PRESSURE: 136 MMHG | HEIGHT: 68 IN | TEMPERATURE: 99 F | WEIGHT: 160.69 LBS | BODY MASS INDEX: 24.35 KG/M2 | OXYGEN SATURATION: 98 %

## 2019-07-31 DIAGNOSIS — J06.9 UPPER RESPIRATORY TRACT INFECTION, UNSPECIFIED TYPE: Primary | ICD-10-CM

## 2019-07-31 DIAGNOSIS — F41.9 ANXIETY DISORDER, UNSPECIFIED TYPE: ICD-10-CM

## 2019-07-31 DIAGNOSIS — J30.89 ALLERGIC RHINITIS DUE TO OTHER ALLERGIC TRIGGER, UNSPECIFIED SEASONALITY: ICD-10-CM

## 2019-07-31 DIAGNOSIS — R05.9 COUGH: ICD-10-CM

## 2019-07-31 DIAGNOSIS — R09.82 POST-NASAL DRAINAGE: ICD-10-CM

## 2019-07-31 PROCEDURE — 99213 OFFICE O/P EST LOW 20 MIN: CPT | Mod: S$GLB,,, | Performed by: NURSE PRACTITIONER

## 2019-07-31 PROCEDURE — 1101F PT FALLS ASSESS-DOCD LE1/YR: CPT | Mod: S$GLB,,, | Performed by: NURSE PRACTITIONER

## 2019-07-31 PROCEDURE — 3075F SYST BP GE 130 - 139MM HG: CPT | Mod: S$GLB,,, | Performed by: NURSE PRACTITIONER

## 2019-07-31 PROCEDURE — 99999 PR PBB SHADOW E&M-EST. PATIENT-LVL IV: ICD-10-PCS | Mod: PBBFAC,,, | Performed by: NURSE PRACTITIONER

## 2019-07-31 PROCEDURE — 3078F PR MOST RECENT DIASTOLIC BLOOD PRESSURE < 80 MM HG: ICD-10-PCS | Mod: S$GLB,,, | Performed by: NURSE PRACTITIONER

## 2019-07-31 PROCEDURE — 99999 PR PBB SHADOW E&M-EST. PATIENT-LVL IV: CPT | Mod: PBBFAC,,, | Performed by: NURSE PRACTITIONER

## 2019-07-31 PROCEDURE — 3078F DIAST BP <80 MM HG: CPT | Mod: S$GLB,,, | Performed by: NURSE PRACTITIONER

## 2019-07-31 PROCEDURE — 3075F PR MOST RECENT SYSTOLIC BLOOD PRESS GE 130-139MM HG: ICD-10-PCS | Mod: S$GLB,,, | Performed by: NURSE PRACTITIONER

## 2019-07-31 PROCEDURE — 99213 PR OFFICE/OUTPT VISIT, EST, LEVL III, 20-29 MIN: ICD-10-PCS | Mod: S$GLB,,, | Performed by: NURSE PRACTITIONER

## 2019-07-31 PROCEDURE — 1101F PR PT FALLS ASSESS DOC 0-1 FALLS W/OUT INJ PAST YR: ICD-10-PCS | Mod: S$GLB,,, | Performed by: NURSE PRACTITIONER

## 2019-07-31 RX ORDER — AZELASTINE 1 MG/ML
1 SPRAY, METERED NASAL 2 TIMES DAILY
Qty: 30 ML | Refills: 2 | Status: SHIPPED | OUTPATIENT
Start: 2019-07-31 | End: 2020-02-20

## 2019-07-31 RX ORDER — AZITHROMYCIN 250 MG/1
TABLET, FILM COATED ORAL
Qty: 6 TABLET | Refills: 0 | Status: SHIPPED | OUTPATIENT
Start: 2019-07-31 | End: 2019-08-05

## 2019-07-31 RX ORDER — BENZONATATE 100 MG/1
200 CAPSULE ORAL 3 TIMES DAILY PRN
Qty: 60 CAPSULE | Refills: 0 | Status: SHIPPED | OUTPATIENT
Start: 2019-07-31 | End: 2019-08-10

## 2019-07-31 NOTE — PATIENT INSTRUCTIONS

## 2019-07-31 NOTE — PROGRESS NOTES
Subjective:       Patient ID: Bernardo Oliveira is a 83 y.o. male.    Chief Complaint: Sinus Problem    Sinus Problem   This is a recurrent problem. The current episode started 1 to 4 weeks ago (2 weeks). The problem has been waxing and waning since onset. There has been no fever. The pain is moderate. Associated symptoms include congestion, coughing, ear pain, headaches, a hoarse voice, sinus pressure, sneezing and swollen glands. Pertinent negatives include no chills, diaphoresis, shortness of breath or sore throat. Past treatments include lying down, sitting up and saline sprays. The treatment provided no relief.   Cough   This is a recurrent problem. The current episode started 1 to 4 weeks ago. The problem has been waxing and waning. The problem occurs every few minutes. The cough is non-productive. Associated symptoms include ear congestion, ear pain, headaches, nasal congestion, postnasal drip and rhinorrhea. Pertinent negatives include no chest pain, chills, fever, rash, sore throat, shortness of breath or wheezing. The symptoms are aggravated by lying down. He has tried rest (saline nasal sprays) for the symptoms. The treatment provided mild relief.     Review of Systems   Constitutional: Positive for activity change and fatigue. Negative for appetite change, chills, diaphoresis, fever and unexpected weight change.   HENT: Positive for congestion, ear pain, hoarse voice, postnasal drip, rhinorrhea, sinus pressure and sneezing. Negative for ear discharge, hearing loss, sinus pain, sore throat, trouble swallowing and voice change.    Respiratory: Positive for cough. Negative for chest tightness, shortness of breath and wheezing.    Cardiovascular: Negative for chest pain, palpitations and leg swelling.   Gastrointestinal: Negative for abdominal pain and nausea.   Endocrine: Negative for cold intolerance and heat intolerance.   Skin: Negative for color change and rash.   Neurological: Positive for headaches.  Negative for dizziness and weakness.   Hematological: Negative for adenopathy.   Psychiatric/Behavioral: Negative for agitation, confusion, self-injury and suicidal ideas.       Past Medical History:   Diagnosis Date    Anxiety     Depression     GERD (gastroesophageal reflux disease)     Hyperlipidemia     Hypertension     Vocal cord cancer       Past Surgical History:   Procedure Laterality Date    CATARACT EXTRACTION, BILATERAL      CHOLECYSTECTOMY      CIRCUMCISION      COLONOSCOPY N/A 2012    [facility]    EGD (ESOPHAGOGASTRODUODENOSCOPY) N/A 2012    [facility]    INGUINAL HERNIA REPAIR Right     INGUINAL HERNIA REPAIR Left     RETINAL DETACHMENT SURGERY      THROAT SURGERY      X 2 for vocal cord cancer    TONSILLECTOMY      TRANSURETHRAL RESECTION OF PROSTATE      dr cindi ryan - [facility]       History reviewed. No pertinent family history.    Social History     Socioeconomic History    Marital status:      Spouse name: Not on file    Number of children: Not on file    Years of education: Not on file    Highest education level: Not on file   Occupational History    Occupation: Retired - [employer]  on [employer]   Social Needs    Financial resource strain: Not on file    Food insecurity:     Worry: Not on file     Inability: Not on file    Transportation needs:     Medical: Not on file     Non-medical: Not on file   Tobacco Use    Smoking status: Former Smoker     Packs/day: 1.50     Last attempt to quit: 1982     Years since quittin.2    Smokeless tobacco: Never Used   Substance and Sexual Activity    Alcohol use: Not Currently    Drug use: No    Sexual activity: Never   Lifestyle    Physical activity:     Days per week: Not on file     Minutes per session: Not on file    Stress: Not on file   Relationships    Social connections:     Talks on phone: Not on file      "Gets together: Not on file     Attends Jain service: Not on file     Active member of club or organization: Not on file     Attends meetings of clubs or organizations: Not on file     Relationship status: Not on file   Other Topics Concern    Not on file   Social History Narrative    Not on file       Current Outpatient Medications   Medication Sig Dispense Refill    ALPRAZolam (XANAX) 0.25 MG tablet Take 1 tablet (0.25 mg total) by mouth as needed for Anxiety. 30 tablet 1    atorvastatin (LIPITOR) 40 MG tablet Take 1 tablet (40 mg total) by mouth once daily. 90 tablet 1    azelastine (ASTELIN) 137 mcg (0.1 %) nasal spray 1 spray (137 mcg total) by Nasal route 2 (two) times daily. 30 mL 2    lisinopril 10 MG tablet TAKE 1 TABLET TWICE DAILY 180 tablet 1    metoprolol succinate (TOPROL-XL) 25 MG 24 hr tablet Take 1 tablet (25 mg total) by mouth once daily. 90 tablet 1    ranitidine (ZANTAC) 150 MG tablet TAKE 1 TABLET EVERY DAY AS NEEDED  FOR  HEARTBURN 90 tablet 1    azithromycin (Z-CYNDY) 250 MG tablet Take 2 tablets (500 mg total) by mouth once daily for 1 day, THEN 1 tablet (250 mg total) once daily for 4 days. 6 tablet 0    benzonatate (TESSALON) 100 MG capsule Take 2 capsules (200 mg total) by mouth 3 (three) times daily as needed. 60 capsule 0     No current facility-administered medications for this visit.        Review of patient's allergies indicates:   Allergen Reactions    Codeine      Objective:      Blood pressure (!) 140/64, pulse 72, temperature 98.6 °F (37 °C), temperature source Oral, height 5' 8" (1.727 m), weight 72.9 kg (160 lb 11.2 oz), SpO2 98 %. Body mass index is 24.43 kg/m².   Physical Exam   Constitutional: He is oriented to person, place, and time. He appears well-developed.   HENT:   Head: Normocephalic and atraumatic.   Right Ear: Ear canal normal. Tympanic membrane is bulging. Tympanic membrane is not erythematous. A middle ear effusion is present.   Left Ear: Ear canal " normal. Tympanic membrane is bulging. Tympanic membrane is not erythematous. A middle ear effusion is present.   Nose: Mucosal edema and rhinorrhea present. Right sinus exhibits no maxillary sinus tenderness and no frontal sinus tenderness. Left sinus exhibits maxillary sinus tenderness. Left sinus exhibits no frontal sinus tenderness.   Mouth/Throat: Uvula is midline and mucous membranes are normal. Oropharyngeal exudate (post nasal drainage noted) present. No posterior oropharyngeal edema, posterior oropharyngeal erythema or tonsillar abscesses.   Eyes: Pupils are equal, round, and reactive to light. Conjunctivae, EOM and lids are normal.   Neck: Normal range of motion. Neck supple.   Cardiovascular: Normal rate, regular rhythm, S1 normal, S2 normal, normal heart sounds and normal pulses.   No murmur heard.  Pulmonary/Chest: Effort normal and breath sounds normal. No respiratory distress. He has no wheezes.   Lymphadenopathy:     He has cervical adenopathy.        Right cervical: Superficial cervical adenopathy present.        Left cervical: Superficial cervical adenopathy present.   Neurological: He is alert and oriented to person, place, and time.   Skin: Skin is warm and dry. No rash noted.   Psychiatric: He has a normal mood and affect. His speech is normal and behavior is normal.   Nursing note and vitals reviewed.          Assessment:       1. Upper respiratory tract infection, unspecified type    2. Allergic rhinitis due to other allergic trigger, unspecified seasonality    3. Post-nasal drainage    4. Cough        Plan:       Bernardo was seen today for sinus problem.    Diagnoses and all orders for this visit:    Upper respiratory tract infection, unspecified type  -     azithromycin (Z-CYNDY) 250 MG tablet; Take 2 tablets (500 mg total) by mouth once daily for 1 day, THEN 1 tablet (250 mg total) once daily for 4 days.    Allergic rhinitis due to other allergic trigger, unspecified seasonality  -      azelastine (ASTELIN) 137 mcg (0.1 %) nasal spray; 1 spray (137 mcg total) by Nasal route 2 (two) times daily.    Post-nasal drainage  -     azelastine (ASTELIN) 137 mcg (0.1 %) nasal spray; 1 spray (137 mcg total) by Nasal route 2 (two) times daily.    Cough  -     benzonatate (TESSALON) 100 MG capsule; Take 2 capsules (200 mg total) by mouth 3 (three) times daily as needed.       Return in 1 week if not resolved.

## 2019-08-02 RX ORDER — ALPRAZOLAM 0.25 MG/1
0.25 TABLET ORAL
Qty: 30 TABLET | Refills: 0 | Status: SHIPPED | OUTPATIENT
Start: 2019-08-02 | End: 2019-08-20 | Stop reason: SDUPTHER

## 2019-08-20 ENCOUNTER — OFFICE VISIT (OUTPATIENT)
Dept: FAMILY MEDICINE | Facility: CLINIC | Age: 84
End: 2019-08-20
Payer: MEDICARE

## 2019-08-20 VITALS
RESPIRATION RATE: 20 BRPM | HEART RATE: 73 BPM | SYSTOLIC BLOOD PRESSURE: 135 MMHG | OXYGEN SATURATION: 99 % | BODY MASS INDEX: 24.1 KG/M2 | HEIGHT: 68 IN | WEIGHT: 159 LBS | DIASTOLIC BLOOD PRESSURE: 75 MMHG

## 2019-08-20 DIAGNOSIS — R05.9 COUGH: Primary | ICD-10-CM

## 2019-08-20 DIAGNOSIS — F41.9 ANXIETY DISORDER, UNSPECIFIED TYPE: ICD-10-CM

## 2019-08-20 DIAGNOSIS — J45.991 COUGH VARIANT ASTHMA: ICD-10-CM

## 2019-08-20 DIAGNOSIS — I10 ESSENTIAL HYPERTENSION: ICD-10-CM

## 2019-08-20 DIAGNOSIS — E78.2 MIXED DYSLIPIDEMIA: ICD-10-CM

## 2019-08-20 DIAGNOSIS — R53.83 FATIGUE, UNSPECIFIED TYPE: ICD-10-CM

## 2019-08-20 PROCEDURE — 3075F PR MOST RECENT SYSTOLIC BLOOD PRESS GE 130-139MM HG: ICD-10-PCS | Mod: S$GLB,,, | Performed by: INTERNAL MEDICINE

## 2019-08-20 PROCEDURE — 3075F SYST BP GE 130 - 139MM HG: CPT | Mod: S$GLB,,, | Performed by: INTERNAL MEDICINE

## 2019-08-20 PROCEDURE — 99999 PR PBB SHADOW E&M-EST. PATIENT-LVL IV: CPT | Mod: PBBFAC,,, | Performed by: INTERNAL MEDICINE

## 2019-08-20 PROCEDURE — 1101F PR PT FALLS ASSESS DOC 0-1 FALLS W/OUT INJ PAST YR: ICD-10-PCS | Mod: S$GLB,,, | Performed by: INTERNAL MEDICINE

## 2019-08-20 PROCEDURE — 99214 PR OFFICE/OUTPT VISIT, EST, LEVL IV, 30-39 MIN: ICD-10-PCS | Mod: S$GLB,,, | Performed by: INTERNAL MEDICINE

## 2019-08-20 PROCEDURE — 3078F DIAST BP <80 MM HG: CPT | Mod: S$GLB,,, | Performed by: INTERNAL MEDICINE

## 2019-08-20 PROCEDURE — 99214 OFFICE O/P EST MOD 30 MIN: CPT | Mod: S$GLB,,, | Performed by: INTERNAL MEDICINE

## 2019-08-20 PROCEDURE — 3078F PR MOST RECENT DIASTOLIC BLOOD PRESSURE < 80 MM HG: ICD-10-PCS | Mod: S$GLB,,, | Performed by: INTERNAL MEDICINE

## 2019-08-20 PROCEDURE — 1101F PT FALLS ASSESS-DOCD LE1/YR: CPT | Mod: S$GLB,,, | Performed by: INTERNAL MEDICINE

## 2019-08-20 PROCEDURE — 99999 PR PBB SHADOW E&M-EST. PATIENT-LVL IV: ICD-10-PCS | Mod: PBBFAC,,, | Performed by: INTERNAL MEDICINE

## 2019-08-20 RX ORDER — ALBUTEROL SULFATE 90 UG/1
2 AEROSOL, METERED RESPIRATORY (INHALATION) 2 TIMES DAILY
Qty: 2 INHALER | Refills: 2 | Status: SHIPPED | OUTPATIENT
Start: 2019-08-20 | End: 2020-02-20

## 2019-08-20 RX ORDER — BENZONATATE 100 MG/1
100 CAPSULE ORAL 3 TIMES DAILY PRN
COMMUNITY
End: 2019-11-07

## 2019-08-20 RX ORDER — ALPRAZOLAM 0.25 MG/1
0.25 TABLET ORAL DAILY PRN
Qty: 30 TABLET | Refills: 0 | Status: SHIPPED | OUTPATIENT
Start: 2019-08-20 | End: 2019-10-25 | Stop reason: SDUPTHER

## 2019-08-20 NOTE — PROGRESS NOTES
Subjective:       Patient ID: Bernardo Oliveira is a 83 y.o. male.    Chief Complaint: Hyperlipidemia; Anxiety; Hypertension; and Cough    Patient is 83-year-old gentleman who comes for follow-up.  Off lately has been feeling somewhat fatigued and tired.  Sleep is not that great at night.  He also is experiencing cough for the last 1 month.  Prior CT scan had shown some pleural plaques.    He has occasional episodes of anxiety when he takes Xanax perhaps no more than 2 or 3 times a week.  Sometimes he does not to take any medication.    His blood pressures are somewhat borderline in the office but he states that they are good at home.    Hyperlipidemia   This is a chronic problem. The current episode started more than 1 year ago. He has no history of obesity. Pertinent negatives include no chest pain or shortness of breath. Current antihyperlipidemic treatment includes statins. Risk factors for coronary artery disease include a sedentary lifestyle, hypertension and dyslipidemia.   Anxiety   Presents for follow-up visit. Symptoms include excessive worry, insomnia (occ) and malaise. Patient reports no chest pain, compulsions, confusion, decreased concentration, depressed mood, dizziness, nervous/anxious behavior, palpitations or shortness of breath. Symptoms occur most days.       Hypertension   This is a chronic problem. The current episode started more than 1 year ago. The problem has been waxing and waning since onset. Associated symptoms include anxiety. Pertinent negatives include no chest pain, neck pain, palpitations or shortness of breath. Risk factors for coronary artery disease include sedentary lifestyle. The current treatment provides moderate improvement. Compliance problems include psychosocial issues.    Cough   This is a new problem. The current episode started more than 1 month ago. The problem has been unchanged. The problem occurs constantly. The cough is non-productive. Associated symptoms include  rhinorrhea. Pertinent negatives include no chest pain, fever, rash or shortness of breath. There is no history of environmental allergies.   Fatigue   This is a recurrent problem. The current episode started more than 1 month ago. The problem occurs constantly. The problem has been unchanged. Associated symptoms include congestion, coughing and fatigue. Pertinent negatives include no abdominal pain, arthralgias, chest pain, fever, neck pain, numbness or rash. The symptoms are aggravated by exertion. He has tried nothing for the symptoms. The treatment provided no relief.       Past Medical History:   Diagnosis Date    Anxiety     Depression     GERD (gastroesophageal reflux disease)     Hyperlipidemia     Hypertension     Vocal cord cancer      Social History     Socioeconomic History    Marital status:      Spouse name: Not on file    Number of children: Not on file    Years of education: Not on file    Highest education level: Not on file   Occupational History    Occupation: Retired -  on [employer]   Social Needs    Financial resource strain: Not on file    Food insecurity:     Worry: Not on file     Inability: Not on file    Transportation needs:     Medical: Not on file     Non-medical: Not on file   Tobacco Use    Smoking status: Former Smoker     Packs/day: 1.50     Types: Cigarettes     Last attempt to quit: 1982     Years since quittin.2    Smokeless tobacco: Never Used   Substance and Sexual Activity    Alcohol use: Not Currently    Drug use: No    Sexual activity: Not Currently   Lifestyle    Physical activity:     Days per week: Not on file     Minutes per session: Not on file    Stress: Not at all   Relationships    Social connections:     Talks on phone: Not on file     Gets together: Not on file     Attends Alevism service: Not on file     Active member of club or organization: Not on file     Attends meetings of clubs or organizations: Not on file      Relationship status: Not on file   Other Topics Concern    Not on file   Social History Narrative    Not on file     Past Surgical History:   Procedure Laterality Date    CATARACT EXTRACTION, BILATERAL      CHOLECYSTECTOMY      CIRCUMCISION      COLONOSCOPY N/A 6/5/2012    [facility]    EGD (ESOPHAGOGASTRODUODENOSCOPY) N/A 6/5/2012    [facility]    INGUINAL HERNIA REPAIR Right 2000    INGUINAL HERNIA REPAIR Left 1995    RETINAL DETACHMENT SURGERY      THROAT SURGERY      X 2 for vocal cord cancer    TONSILLECTOMY      TRANSURETHRAL RESECTION OF PROSTATE  2004    dr cindi ryan - [facility]     History reviewed. No pertinent family history.    Review of Systems   Constitutional: Positive for fatigue. Negative for activity change, appetite change, fever and unexpected weight change.   HENT: Positive for congestion, hearing loss, rhinorrhea, sinus pressure and sneezing. Negative for trouble swallowing.    Eyes: Negative for pain and visual disturbance.   Respiratory: Positive for cough. Negative for chest tightness and shortness of breath.    Cardiovascular: Negative for chest pain, palpitations and leg swelling.   Gastrointestinal: Negative for abdominal distention, abdominal pain, blood in stool, constipation and diarrhea.   Endocrine: Negative for cold intolerance, heat intolerance, polydipsia, polyphagia and polyuria.   Genitourinary: Negative for dysuria, hematuria and scrotal swelling.   Musculoskeletal: Negative for arthralgias, back pain, gait problem and neck pain.   Skin: Negative for pallor, rash and wound.   Allergic/Immunologic: Negative for environmental allergies, food allergies and immunocompromised state.   Neurological: Negative for dizziness, seizures, speech difficulty, light-headedness and numbness.   Hematological: Negative for adenopathy. Does not bruise/bleed easily.   Psychiatric/Behavioral: Negative for  "agitation, behavioral problems, confusion and decreased concentration. The patient has insomnia (occ). The patient is not nervous/anxious.          Objective:      Blood pressure 135/75, pulse 73, height 5' 8" (1.727 m), weight 72.1 kg (159 lb). Body mass index is 24.18 kg/m².  Physical Exam   Constitutional: He appears well-developed and well-nourished. No distress.   HENT:   Head: Normocephalic and atraumatic.   Right Ear: Decreased hearing is noted.   Left Ear: Decreased hearing is noted.   Nose: Nose normal.   Mouth/Throat: Oropharynx is clear and moist. No oropharyngeal exudate.   Hearing aid   Eyes: Conjunctivae and EOM are normal.   Neck: Normal range of motion. Neck supple. No JVD present. No tracheal deviation present. No thyromegaly present.   Cardiovascular: Normal rate, regular rhythm and normal heart sounds. Exam reveals no gallop and no friction rub.   No murmur heard.  Pulmonary/Chest: Effort normal and breath sounds normal. No respiratory distress. He has no wheezes. He has no rales.   Abdominal: Soft. Bowel sounds are normal. He exhibits no distension. There is no tenderness.   Neurological: He is alert. He displays tremor.   Mild tremors- pt denies   Skin: Skin is warm and dry.   Senile freckles.    Psychiatric: He has a normal mood and affect.   Nursing note and vitals reviewed.        Assessment:       1. Cough    2. Essential hypertension    3. Mixed dyslipidemia    4. Fatigue, unspecified type    5. Anxiety disorder, unspecified type    6. Cough variant asthma         IMPRESSION:    1. Opacity seen on recent chest x-ray represents calcified pleural plaque.  The calcifications were also seen on previous chest x-rays dating back to  2/11/2013. It may have been slightly more prominently seen on the current chest  x-ray due to slight enlargement of the soft tissue component of the over the  past several years and perhaps also due to technical factors, such as x-ray  exposure factors and differences " in positioning of patient.  This may be related to asbestos exposure if applicable.    2. No other significant findings.    Read and electronically signed by: Abhinav Parsons MD on 2/15/2019 9:05 AM CST  No visits with results within 3 Month(s) from this visit.   Latest known visit with results is:   Lab Visit on 05/07/2019   Component Date Value Ref Range Status    PSA, SCREEN 05/07/2019 1.5  0.00 - 4.00 ng/mL Final         Plan:           Cough  -     CBC auto differential; Future; Expected date: 08/20/2019  -     albuterol (VENTOLIN HFA) 90 mcg/actuation inhaler; Inhale 2 puffs into the lungs 2 (two) times daily. Rescue  Dispense: 2 Inhaler; Refill: 2    Essential hypertension  -     Comprehensive metabolic panel; Future; Expected date: 08/20/2019    Mixed dyslipidemia    Fatigue, unspecified type  -     TSH; Future; Expected date: 08/20/2019  -     Sedimentation rate; Future; Expected date: 08/20/2019    Anxiety disorder, unspecified type  -     ALPRAZolam (XANAX) 0.25 MG tablet; Take 1 tablet (0.25 mg total) by mouth daily as needed for Anxiety.  Dispense: 30 tablet; Refill: 0    Cough variant asthma  -     albuterol (VENTOLIN HFA) 90 mcg/actuation inhaler; Inhale 2 puffs into the lungs 2 (two) times daily. Rescue  Dispense: 2 Inhaler; Refill: 2        Patient's cause of cough is unknown at this point.  This could be a postnasal drip versus reflux versus occult asthma or COPD.  CT scan had shown some pleural plaques.  Significance of this is unclear.  I will give a trial of Ventolin inhaler 1 puff twice a day and see how he does.    New prescription has been given for alprazolam dispense number 30 no refills.    Cause of fatigue could be multifactorial.  He has enough medical issues causing fatigue to the with the age of 83.    Follow-up in 1 month and to review his fatigue.    Current Outpatient Medications:     ALPRAZolam (XANAX) 0.25 MG tablet, Take 1 tablet (0.25 mg total) by mouth daily as needed for  Anxiety., Disp: 30 tablet, Rfl: 0    atorvastatin (LIPITOR) 40 MG tablet, Take 1 tablet (40 mg total) by mouth once daily., Disp: 90 tablet, Rfl: 1    azelastine (ASTELIN) 137 mcg (0.1 %) nasal spray, 1 spray (137 mcg total) by Nasal route 2 (two) times daily., Disp: 30 mL, Rfl: 2    benzonatate (TESSALON) 100 MG capsule, Take 100 mg by mouth 3 (three) times daily as needed for Cough., Disp: , Rfl:     lisinopril 10 MG tablet, TAKE 1 TABLET TWICE DAILY, Disp: 180 tablet, Rfl: 1    metoprolol succinate (TOPROL-XL) 25 MG 24 hr tablet, Take 1 tablet (25 mg total) by mouth once daily., Disp: 90 tablet, Rfl: 1    ranitidine (ZANTAC) 150 MG tablet, TAKE 1 TABLET EVERY DAY AS NEEDED  FOR  HEARTBURN, Disp: 90 tablet, Rfl: 1    albuterol (VENTOLIN HFA) 90 mcg/actuation inhaler, Inhale 2 puffs into the lungs 2 (two) times daily. Rescue, Disp: 2 Inhaler, Rfl: 2

## 2019-08-20 NOTE — PATIENT INSTRUCTIONS
Taking Your Blood Pressure  Blood pressure is the force of blood against the artery wall as it moves from the heart through the blood vessels. You can take your own blood pressure reading using a digital monitor. Take your readings the same each time, using the same arm. Take readings as often as your healthcare provider instructs.  About blood pressure monitors  Blood pressure monitors are designed for certain ages and cases. You can find monitors for older adults, for pregnant women, and for children. Make sure the one you choose is the right one for your age and situation.  The American Heart Association recommends an automatic cuff monitor that fits on your upper arm (bicep). The cuff should fit your arm size. A cuff thats too large or too small will not give an accurate reading. Measure around your upper arm to find your size.  Monitors that attach to your finger or wrist are not as accurate as monitors for your upper arm.  Ask your healthcare provider for help in choosing a monitor. Bring your monitor to your next provider visit if you need help in using it the correct way.  The steps below are general instructions for using an automatic digital monitor.  Step 1. Relax    · Take your blood pressure at the same time every day, such as in the morning or evening, or at the time your healthcare provider recommends.  · Wait at least a half-hour after smoking, eating, or exercising. Don't drink coffee, tea, soda, or other caffeinated beverages before checking your blood pressure.  · Sit comfortably at a table with both feet on the floor. Do not cross your legs or feet. Place the monitor near you.  · Rest for a few minutes before you begin.  Step 2. Wrap the cuff    · Place your arm on the table, palm up. Your arm should be at the level of your heart. Wrap the cuff around your upper arm, just above your elbow. Its best done on bare skin, not over clothing. Most cuffs will indicate where the brachial artery (the  blood vessel in the middle of the arm at the inner side of the elbow) should line up with the cuff. Look in your monitor's instruction booklet for an illustration. You can also bring your cuff to your healthcare provider and have them show you how to correctly place the cuff.  Step 3. Inflate the cuff    · Push the button that starts the pump.  · The cuff will tighten, then loosen.  · The numbers will change. When they stop changing, your blood pressure reading will appear.  · Take 2 or 3 readings one minute apart.  Step 4. Write down the results of each reading    · Write down your blood pressure numbers for each reading. Note the date and time. Keep your results in one place, such as a notebook. Even if your monitor has a built-in memory, keep a hard copy of the readings.  · Remove the cuff from your arm. Turn off the machine.  · Bring your blood pressure records with your healthcare providers at each visit.  · If you start a new blood pressure medicine, note the day you started the new medicine. Also note the day if you change the dose of your medicine. This information goes on your blood pressure recording sheet. This will help your healthcare provider monitor how well the medicine changes are working.  · Ask your healthcare provider what numbers should prompt you to call him or her. Also ask what numbers should prompt you to get help right away.  Date Last Reviewed: 11/1/2016  © 3310-0879 [employer]. [address] [place] [address], [place] [address]. All rights reserved. This information is not intended as a substitute for professional medical care. Always follow your healthcare professional's instructions.

## 2019-08-21 ENCOUNTER — LAB VISIT (OUTPATIENT)
Dept: LAB | Facility: HOSPITAL | Age: 84
End: 2019-08-21
Attending: INTERNAL MEDICINE
Payer: MEDICARE

## 2019-08-21 DIAGNOSIS — R05.9 COUGH: ICD-10-CM

## 2019-08-21 DIAGNOSIS — R53.83 FATIGUE, UNSPECIFIED TYPE: ICD-10-CM

## 2019-08-21 DIAGNOSIS — I10 ESSENTIAL HYPERTENSION: ICD-10-CM

## 2019-08-21 LAB
ALBUMIN SERPL BCP-MCNC: 3.6 G/DL (ref 3.5–5.2)
ALP SERPL-CCNC: 54 U/L (ref 55–135)
ALT SERPL W/O P-5'-P-CCNC: 17 U/L (ref 10–44)
ANION GAP SERPL CALC-SCNC: 5 MMOL/L (ref 8–16)
AST SERPL-CCNC: 18 U/L (ref 10–40)
BASOPHILS # BLD AUTO: 0.06 K/UL (ref 0–0.2)
BASOPHILS NFR BLD: 0.9 % (ref 0–1.9)
BILIRUB SERPL-MCNC: 1.2 MG/DL (ref 0.1–1)
BUN SERPL-MCNC: 13 MG/DL (ref 8–23)
CALCIUM SERPL-MCNC: 8.9 MG/DL (ref 8.7–10.5)
CHLORIDE SERPL-SCNC: 107 MMOL/L (ref 95–110)
CO2 SERPL-SCNC: 28 MMOL/L (ref 23–29)
CREAT SERPL-MCNC: 1 MG/DL (ref 0.5–1.4)
DIFFERENTIAL METHOD: NORMAL
EOSINOPHIL # BLD AUTO: 0.1 K/UL (ref 0–0.5)
EOSINOPHIL NFR BLD: 2.2 % (ref 0–8)
ERYTHROCYTE [DISTWIDTH] IN BLOOD BY AUTOMATED COUNT: 13.5 % (ref 11.5–14.5)
ERYTHROCYTE [SEDIMENTATION RATE] IN BLOOD BY WESTERGREN METHOD: 6 MM/HR (ref 0–10)
EST. GFR  (AFRICAN AMERICAN): >60 ML/MIN/1.73 M^2
EST. GFR  (NON AFRICAN AMERICAN): >60 ML/MIN/1.73 M^2
GLUCOSE SERPL-MCNC: 91 MG/DL (ref 70–110)
HCT VFR BLD AUTO: 43.3 % (ref 40–54)
HGB BLD-MCNC: 14.3 G/DL (ref 14–18)
IMM GRANULOCYTES # BLD AUTO: 0.01 K/UL (ref 0–0.04)
IMM GRANULOCYTES NFR BLD AUTO: 0.2 % (ref 0–0.5)
LYMPHOCYTES # BLD AUTO: 2.6 K/UL (ref 1–4.8)
LYMPHOCYTES NFR BLD: 41 % (ref 18–48)
MCH RBC QN AUTO: 29.5 PG (ref 27–31)
MCHC RBC AUTO-ENTMCNC: 33 G/DL (ref 32–36)
MCV RBC AUTO: 90 FL (ref 82–98)
MONOCYTES # BLD AUTO: 0.6 K/UL (ref 0.3–1)
MONOCYTES NFR BLD: 9.3 % (ref 4–15)
NEUTROPHILS # BLD AUTO: 2.9 K/UL (ref 1.8–7.7)
NEUTROPHILS NFR BLD: 46.4 % (ref 38–73)
NRBC BLD-RTO: 0 /100 WBC
PLATELET # BLD AUTO: 152 K/UL (ref 150–350)
PMV BLD AUTO: 10.7 FL (ref 9.2–12.9)
POTASSIUM SERPL-SCNC: 4.4 MMOL/L (ref 3.5–5.1)
PROT SERPL-MCNC: 6.8 G/DL (ref 6–8.4)
RBC # BLD AUTO: 4.84 M/UL (ref 4.6–6.2)
SODIUM SERPL-SCNC: 140 MMOL/L (ref 136–145)
TSH SERPL DL<=0.005 MIU/L-ACNC: 0.58 UIU/ML (ref 0.34–5.6)
WBC # BLD AUTO: 6.34 K/UL (ref 3.9–12.7)

## 2019-08-21 PROCEDURE — 36415 COLL VENOUS BLD VENIPUNCTURE: CPT

## 2019-08-21 PROCEDURE — 84443 ASSAY THYROID STIM HORMONE: CPT

## 2019-08-21 PROCEDURE — 80053 COMPREHEN METABOLIC PANEL: CPT

## 2019-08-21 PROCEDURE — 85025 COMPLETE CBC W/AUTO DIFF WBC: CPT

## 2019-08-21 PROCEDURE — 85651 RBC SED RATE NONAUTOMATED: CPT

## 2019-08-22 ENCOUNTER — TELEPHONE (OUTPATIENT)
Dept: FAMILY MEDICINE | Facility: CLINIC | Age: 84
End: 2019-08-22

## 2019-08-22 NOTE — TELEPHONE ENCOUNTER
----- Message from Beny Fay MD sent at 8/21/2019 10:07 PM CDT -----  Notify patient or patient wife that his labs including sedimentation rate (test for inflammation), TSH-test for thyroid, blood count and general chemistry are within normal range.  No easy explanation for his fatigue.  Did he get to try the inhaler Ventolin and if so did help him with his cough?    Keep regular follow-up as scheduled.

## 2019-08-22 NOTE — PROGRESS NOTES
Notify patient or patient wife that his labs including sedimentation rate (test for inflammation), TSH-test for thyroid, blood count and general chemistry are within normal range.  No easy explanation for his fatigue.  Did he get to try the inhaler Ventolin and if so did help him with his cough?    Keep regular follow-up as scheduled.

## 2019-09-23 RX ORDER — METOPROLOL SUCCINATE 25 MG/1
TABLET, EXTENDED RELEASE ORAL
Qty: 90 TABLET | Refills: 1 | Status: SHIPPED | OUTPATIENT
Start: 2019-09-23 | End: 2020-02-17

## 2019-09-24 ENCOUNTER — OFFICE VISIT (OUTPATIENT)
Dept: FAMILY MEDICINE | Facility: CLINIC | Age: 84
End: 2019-09-24
Payer: MEDICARE

## 2019-09-24 VITALS
DIASTOLIC BLOOD PRESSURE: 88 MMHG | HEART RATE: 72 BPM | BODY MASS INDEX: 24.71 KG/M2 | WEIGHT: 163 LBS | HEIGHT: 68 IN | TEMPERATURE: 99 F | SYSTOLIC BLOOD PRESSURE: 138 MMHG

## 2019-09-24 DIAGNOSIS — J06.9 UPPER RESPIRATORY TRACT INFECTION, UNSPECIFIED TYPE: Primary | ICD-10-CM

## 2019-09-24 PROCEDURE — 3079F PR MOST RECENT DIASTOLIC BLOOD PRESSURE 80-89 MM HG: ICD-10-PCS | Mod: S$GLB,,, | Performed by: INTERNAL MEDICINE

## 2019-09-24 PROCEDURE — 1101F PR PT FALLS ASSESS DOC 0-1 FALLS W/OUT INJ PAST YR: ICD-10-PCS | Mod: S$GLB,,, | Performed by: INTERNAL MEDICINE

## 2019-09-24 PROCEDURE — 3079F DIAST BP 80-89 MM HG: CPT | Mod: S$GLB,,, | Performed by: INTERNAL MEDICINE

## 2019-09-24 PROCEDURE — 3075F SYST BP GE 130 - 139MM HG: CPT | Mod: S$GLB,,, | Performed by: INTERNAL MEDICINE

## 2019-09-24 PROCEDURE — 99999 PR PBB SHADOW E&M-EST. PATIENT-LVL III: CPT | Mod: PBBFAC,,, | Performed by: INTERNAL MEDICINE

## 2019-09-24 PROCEDURE — 1101F PT FALLS ASSESS-DOCD LE1/YR: CPT | Mod: S$GLB,,, | Performed by: INTERNAL MEDICINE

## 2019-09-24 PROCEDURE — 3075F PR MOST RECENT SYSTOLIC BLOOD PRESS GE 130-139MM HG: ICD-10-PCS | Mod: S$GLB,,, | Performed by: INTERNAL MEDICINE

## 2019-09-24 PROCEDURE — 99999 PR PBB SHADOW E&M-EST. PATIENT-LVL III: ICD-10-PCS | Mod: PBBFAC,,, | Performed by: INTERNAL MEDICINE

## 2019-09-24 PROCEDURE — 99212 OFFICE O/P EST SF 10 MIN: CPT | Mod: S$GLB,,, | Performed by: INTERNAL MEDICINE

## 2019-09-24 PROCEDURE — 99212 PR OFFICE/OUTPT VISIT, EST, LEVL II, 10-19 MIN: ICD-10-PCS | Mod: S$GLB,,, | Performed by: INTERNAL MEDICINE

## 2019-09-24 RX ORDER — PROMETHAZINE HYDROCHLORIDE AND DEXTROMETHORPHAN HYDROBROMIDE 6.25; 15 MG/5ML; MG/5ML
5 SYRUP ORAL EVERY 8 HOURS PRN
Qty: 120 ML | Refills: 0 | Status: SHIPPED | OUTPATIENT
Start: 2019-09-24 | End: 2019-10-04

## 2019-09-24 RX ORDER — PREDNISONE 20 MG/1
20 TABLET ORAL DAILY
Qty: 4 TABLET | Refills: 0 | Status: SHIPPED | OUTPATIENT
Start: 2019-09-24 | End: 2019-09-28

## 2019-09-24 RX ORDER — DOXYCYCLINE 100 MG/1
100 CAPSULE ORAL 2 TIMES DAILY
Qty: 14 CAPSULE | Refills: 0 | Status: SHIPPED | OUTPATIENT
Start: 2019-09-24 | End: 2019-11-07

## 2019-09-24 NOTE — PATIENT INSTRUCTIONS
Viral Upper Respiratory Illness (Adult)  You have a viral upper respiratory illness (URI), which is another term for the common cold. This illness is contagious during the first few days. It is spread through the air by coughing and sneezing. It may also be spread by direct contact (touching the sick person and then touching your own eyes, nose, or mouth). Frequent handwashing will decrease risk of spread. Most viral illnesses go away within 7 to 10 days with rest and simple home remedies. Sometimes the illness may last for several weeks. Antibiotics will not kill a virus, and they are generally not prescribed for this condition.    Home care  · If symptoms are severe, rest at home for the first 2 to 3 days. When you resume activity, don't let yourself get too tired.  · Avoid being exposed to cigarette smoke (yours or others).  · You may use acetaminophen or ibuprofen to control pain and fever, unless another medicine was prescribed. (Note: If you have chronic liver or kidney disease, have ever had a stomach ulcer or gastrointestinal bleeding, or are taking blood-thinning medicines, talk with your healthcare provider before using these medicines.) Aspirin should never be given to anyone under 18 years of age who is ill with a viral infection or fever. It may cause severe liver or brain damage.  · Your appetite may be poor, so a light diet is fine. Avoid dehydration by drinking 6 to 8 glasses of fluids per day (water, soft drinks, juices, tea, or soup). Extra fluids will help loosen secretions in the nose and lungs.  · Over-the-counter cold medicines will not shorten the length of time youre sick, but they may be helpful for the following symptoms: cough, sore throat, and nasal and sinus congestion. (Note: Do not use decongestants if you have high blood pressure.)  Follow-up care  Follow up with your healthcare provider, or as advised.  When to seek medical advice  Call your healthcare provider right away if any  of these occur:  · Cough with lots of colored sputum (mucus)  · Severe headache; face, neck, or ear pain  · Difficulty swallowing due to throat pain  · Fever of 100.4°F (38°C)  Call 911, or get immediate medical care  Call emergency services right away if any of these occur:  · Chest pain, shortness of breath, wheezing, or difficulty breathing  · Coughing up blood  · Inability to swallow due to throat pain  Date Last Reviewed: 9/13/2015  © 4942-4434 [employer]. [address] [place] [address]. All rights reserved. This information is not intended as a substitute for professional medical care. Always follow your healthcare professional's instructions.

## 2019-09-24 NOTE — PROGRESS NOTES
Subjective:       Patient ID: Bernardo Oliveira is a 84 y.o. male.    Chief Complaint: Sinus Problem; Cough; and Fever    Patient seems to have postnasal drip, cough and congestion and some discomfort at the back of his throat.  This is going for the last few days.  Perhaps a low-grade fever history of throat cancer has been noted.    URI    This is a new problem. The current episode started in the past 7 days. The problem has been waxing and waning. The maximum temperature recorded prior to his arrival was 100.4 - 100.9 F. The fever has been present for 1 to 2 days. Associated symptoms include congestion, coughing, rhinorrhea and sneezing. Pertinent negatives include no abdominal pain, chest pain, nausea, neck pain or rash. The treatment provided mild relief.       Past Medical History:   Diagnosis Date    Anxiety     Depression     GERD (gastroesophageal reflux disease)     Hyperlipidemia     Hypertension     Vocal cord cancer      Social History     Socioeconomic History    Marital status:      Spouse name: Not on file    Number of children: Not on file    Years of education: Not on file    Highest education level: Not on file   Occupational History    Occupation: Retired -  on [employer]   Social Needs    Financial resource strain: Not on file    Food insecurity:     Worry: Not on file     Inability: Not on file    Transportation needs:     Medical: Not on file     Non-medical: Not on file   Tobacco Use    Smoking status: Former Smoker     Packs/day: 1.50     Types: Cigarettes     Last attempt to quit: 1982     Years since quittin.3    Smokeless tobacco: Never Used   Substance and Sexual Activity    Alcohol use: Not Currently    Drug use: No    Sexual activity: Not Currently   Lifestyle    Physical activity:     Days per week: Not on file     Minutes per session: Not on file    Stress: Not at all   Relationships    Social connections:     Talks on phone: Not on  "file     Gets together: Not on file     Attends Yarsanism service: Not on file     Active member of club or organization: Not on file     Attends meetings of clubs or organizations: Not on file     Relationship status: Not on file   Other Topics Concern    Not on file   Social History Narrative    Not on file     Past Surgical History:   Procedure Laterality Date    CATARACT EXTRACTION, BILATERAL      CHOLECYSTECTOMY      CIRCUMCISION      INGUINAL HERNIA REPAIR Right 2000    INGUINAL HERNIA REPAIR Left 1995    RETINAL DETACHMENT SURGERY      THROAT SURGERY      X 2 for vocal cord cancer    TONSILLECTOMY      TRANSURETHRAL RESECTION OF PROSTATE  2004    dr cindi ryan - [facility]     History reviewed. No pertinent family history.    Review of Systems   Constitutional: Positive for fatigue. Negative for activity change, appetite change, fever and unexpected weight change.   HENT: Positive for congestion, hearing loss, rhinorrhea, sinus pressure and sneezing. Negative for trouble swallowing.         History of throat cancer operated by Dr. Espinal   Eyes: Negative for pain and visual disturbance.   Respiratory: Positive for cough. Negative for chest tightness and shortness of breath.    Cardiovascular: Negative for chest pain, palpitations and leg swelling.   Gastrointestinal: Negative for abdominal pain and nausea.   Musculoskeletal: Negative for neck pain.   Skin: Negative for pallor, rash and wound.   Allergic/Immunologic: Negative for environmental allergies, food allergies and immunocompromised state.         Objective:      Blood pressure 138/88, pulse 72, temperature 99.1 °F (37.3 °C), height 5' 8" (1.727 m), weight 73.9 kg (163 lb). Body mass index is 24.78 kg/m².  Physical Exam   Constitutional: He appears well-developed and well-nourished. No distress.   HENT:   Head: Normocephalic and atraumatic.   Right Ear: Decreased hearing is noted.   Left Ear: Decreased hearing is noted.   Nose: Nose normal. "   Mouth/Throat: Oropharynx is clear and moist. No oropharyngeal exudate.   Hearing aid   Eyes: Conjunctivae and EOM are normal.   Neck: Normal range of motion. Neck supple. No JVD present. No tracheal deviation present. No thyromegaly present.   Cardiovascular: Normal rate, regular rhythm and normal heart sounds. Exam reveals no gallop and no friction rub.   No murmur heard.  Pulmonary/Chest: Effort normal and breath sounds normal. No respiratory distress. He has no wheezes. He has no rales.   Abdominal: Soft. Bowel sounds are normal. He exhibits no distension. There is no tenderness.   Neurological: He is alert. He displays tremor.   Mild tremors- pt denies   Skin: Skin is warm and dry.   Senile freckles.    Psychiatric: He has a normal mood and affect.   Nursing note and vitals reviewed.        Assessment:       1. Upper respiratory tract infection, unspecified type           Lab Visit on 08/21/2019   Component Date Value Ref Range Status    TSH 08/21/2019 0.580  0.340 - 5.600 uIU/mL Final    Sodium 08/21/2019 140  136 - 145 mmol/L Final    Potassium 08/21/2019 4.4  3.5 - 5.1 mmol/L Final    Chloride 08/21/2019 107  95 - 110 mmol/L Final    CO2 08/21/2019 28  23 - 29 mmol/L Final    Glucose 08/21/2019 91  70 - 110 mg/dL Final    BUN, Bld 08/21/2019 13  8 - 23 mg/dL Final    Creatinine 08/21/2019 1.0  0.5 - 1.4 mg/dL Final    Calcium 08/21/2019 8.9  8.7 - 10.5 mg/dL Final    Total Protein 08/21/2019 6.8  6.0 - 8.4 g/dL Final    Albumin 08/21/2019 3.6  3.5 - 5.2 g/dL Final    Total Bilirubin 08/21/2019 1.2* 0.1 - 1.0 mg/dL Final    Alkaline Phosphatase 08/21/2019 54* 55 - 135 U/L Final    AST 08/21/2019 18  10 - 40 U/L Final    ALT 08/21/2019 17  10 - 44 U/L Final    Anion Gap 08/21/2019 5* 8 - 16 mmol/L Final    eGFR if African American 08/21/2019 >60.0  >60 mL/min/1.73 m^2 Final    eGFR if non African American 08/21/2019 >60.0  >60 mL/min/1.73 m^2 Final    WBC 08/21/2019 6.34  3.90 - 12.70 K/uL  Final    RBC 08/21/2019 4.84  4.60 - 6.20 M/uL Final    Hemoglobin 08/21/2019 14.3  14.0 - 18.0 g/dL Final    Hematocrit 08/21/2019 43.3  40.0 - 54.0 % Final    Mean Corpuscular Volume 08/21/2019 90  82 - 98 fL Final    Mean Corpuscular Hemoglobin 08/21/2019 29.5  27.0 - 31.0 pg Final    Mean Corpuscular Hemoglobin Conc 08/21/2019 33.0  32.0 - 36.0 g/dL Final    RDW 08/21/2019 13.5  11.5 - 14.5 % Final    Platelets 08/21/2019 152  150 - 350 K/uL Final    MPV 08/21/2019 10.7  9.2 - 12.9 fL Final    Immature Granulocytes 08/21/2019 0.2  0.0 - 0.5 % Final    Gran # (ANC) 08/21/2019 2.9  1.8 - 7.7 K/uL Final    Immature Grans (Abs) 08/21/2019 0.01  0.00 - 0.04 K/uL Final    Lymph # 08/21/2019 2.6  1.0 - 4.8 K/uL Final    Mono # 08/21/2019 0.6  0.3 - 1.0 K/uL Final    Eos # 08/21/2019 0.1  0.0 - 0.5 K/uL Final    Baso # 08/21/2019 0.06  0.00 - 0.20 K/uL Final    nRBC 08/21/2019 0  0 /100 WBC Final    Gran% 08/21/2019 46.4  38.0 - 73.0 % Final    Lymph% 08/21/2019 41.0  18.0 - 48.0 % Final    Mono% 08/21/2019 9.3  4.0 - 15.0 % Final    Eosinophil% 08/21/2019 2.2  0.0 - 8.0 % Final    Basophil% 08/21/2019 0.9  0.0 - 1.9 % Final    Differential Method 08/21/2019 Automated   Final    Sed Rate 08/21/2019 6  0 - 10 mm/Hr Final         Plan:           Upper respiratory tract infection, unspecified type  -     doxycycline (MONODOX) 100 MG capsule; Take 1 capsule (100 mg total) by mouth 2 (two) times daily.  Dispense: 14 capsule; Refill: 0  -     predniSONE (DELTASONE) 20 MG tablet; Take 1 tablet (20 mg total) by mouth once daily. for 4 days  Dispense: 4 tablet; Refill: 0  -     promethazine-dextromethorphan (PROMETHAZINE-DM) 6.25-15 mg/5 mL Syrp; Take 5 mLs by mouth every 8 (eight) hours as needed (cough and congestion). This medication causes drowsiness.  Dispense: 120 mL; Refill: 0      Increase your water intake to 64-80 oz daily    Nasal Saline spray (Over the counter [place] spray or [place])  2 sprays  each nostril 2-3 times a day for nasal congestion    Tylenol 500 mg 2 tablets or Ibuprofen 200 mg 2 tablets every 4-6 hours as needed for fever, headaches, sore throat, ear pain, bodyaches, and/or nasal/sinus inflammation    Warm salt water gargles with 1/2 cup water and 1 tablespoon salt every 4 hours    Warm tea with honey and lemon    Follow up with PCP in 1 week if symptoms do not resolve              Current Outpatient Medications:     albuterol (VENTOLIN HFA) 90 mcg/actuation inhaler, Inhale 2 puffs into the lungs 2 (two) times daily. Rescue, Disp: 2 Inhaler, Rfl: 2    ALPRAZolam (XANAX) 0.25 MG tablet, Take 1 tablet (0.25 mg total) by mouth daily as needed for Anxiety., Disp: 30 tablet, Rfl: 0    atorvastatin (LIPITOR) 40 MG tablet, Take 1 tablet (40 mg total) by mouth once daily., Disp: 90 tablet, Rfl: 1    azelastine (ASTELIN) 137 mcg (0.1 %) nasal spray, 1 spray (137 mcg total) by Nasal route 2 (two) times daily., Disp: 30 mL, Rfl: 2    lisinopril 10 MG tablet, TAKE 1 TABLET TWICE DAILY, Disp: 180 tablet, Rfl: 1    metoprolol succinate (TOPROL-XL) 25 MG 24 hr tablet, TAKE 1 TABLET EVERY DAY, Disp: 90 tablet, Rfl: 1    ranitidine (ZANTAC) 150 MG tablet, TAKE 1 TABLET EVERY DAY AS NEEDED  FOR  HEARTBURN, Disp: 90 tablet, Rfl: 1    benzonatate (TESSALON) 100 MG capsule, Take 100 mg by mouth 3 (three) times daily as needed for Cough., Disp: , Rfl:     doxycycline (MONODOX) 100 MG capsule, Take 1 capsule (100 mg total) by mouth 2 (two) times daily., Disp: 14 capsule, Rfl: 0    predniSONE (DELTASONE) 20 MG tablet, Take 1 tablet (20 mg total) by mouth once daily. for 4 days, Disp: 4 tablet, Rfl: 0    promethazine-dextromethorphan (PROMETHAZINE-DM) 6.25-15 mg/5 mL Syrp, Take 5 mLs by mouth every 8 (eight) hours as needed (cough and congestion). This medication causes drowsiness., Disp: 120 mL, Rfl: 0

## 2019-10-03 ENCOUNTER — HOSPITAL ENCOUNTER (OUTPATIENT)
Dept: RADIOLOGY | Facility: HOSPITAL | Age: 84
Discharge: HOME | End: 2019-10-03
Attending: OTOLARYNGOLOGY
Payer: MEDICARE

## 2019-10-03 DIAGNOSIS — J32.4 CHRONIC PANSINUSITIS: ICD-10-CM

## 2019-10-03 DIAGNOSIS — J32.4 CHRONIC PANSINUSITIS: Primary | ICD-10-CM

## 2019-10-03 PROCEDURE — 70220 XR SINUSES MIN 3 VIEWS: ICD-10-PCS | Mod: 26,,, | Performed by: RADIOLOGY

## 2019-10-03 PROCEDURE — 70220 X-RAY EXAM OF SINUSES: CPT | Mod: TC,FY

## 2019-10-03 PROCEDURE — 70220 X-RAY EXAM OF SINUSES: CPT | Mod: 26,,, | Performed by: RADIOLOGY

## 2019-10-25 DIAGNOSIS — F41.9 ANXIETY DISORDER, UNSPECIFIED TYPE: ICD-10-CM

## 2019-11-02 RX ORDER — ALPRAZOLAM 0.25 MG/1
TABLET ORAL
Qty: 7 TABLET | Refills: 0 | Status: SHIPPED | OUTPATIENT
Start: 2019-11-02 | End: 2019-11-07 | Stop reason: SDUPTHER

## 2019-11-04 ENCOUNTER — TELEPHONE (OUTPATIENT)
Dept: FAMILY MEDICINE | Facility: CLINIC | Age: 84
End: 2019-11-04

## 2019-11-04 NOTE — TELEPHONE ENCOUNTER
----- Message from Beny Fay MD sent at 11/2/2019  9:54 PM CDT -----  Regarding: Patient needs appointment for refill on controlled prescription  On pharmacy request I have sent a limited prescription for 7 pills of Xanax.  Since this this a controlled prescription, pt needs early follow-up now for long-term prescription.

## 2019-11-07 ENCOUNTER — OFFICE VISIT (OUTPATIENT)
Dept: FAMILY MEDICINE | Facility: CLINIC | Age: 84
End: 2019-11-07
Payer: MEDICARE

## 2019-11-07 VITALS
DIASTOLIC BLOOD PRESSURE: 67 MMHG | BODY MASS INDEX: 25.01 KG/M2 | SYSTOLIC BLOOD PRESSURE: 139 MMHG | HEIGHT: 68 IN | HEART RATE: 77 BPM | WEIGHT: 165 LBS

## 2019-11-07 DIAGNOSIS — Z23 NEED FOR PNEUMOCOCCAL VACCINATION: ICD-10-CM

## 2019-11-07 DIAGNOSIS — Z23 NEEDS FLU SHOT: ICD-10-CM

## 2019-11-07 DIAGNOSIS — F41.9 ANXIETY DISORDER, UNSPECIFIED TYPE: Primary | ICD-10-CM

## 2019-11-07 PROCEDURE — 90732 PNEUMOCOCCAL POLYSACCHARIDE VACCINE 23-VALENT =>2YO SQ IM: ICD-10-PCS | Mod: S$GLB,,, | Performed by: INTERNAL MEDICINE

## 2019-11-07 PROCEDURE — 3075F PR MOST RECENT SYSTOLIC BLOOD PRESS GE 130-139MM HG: ICD-10-PCS | Mod: S$GLB,,, | Performed by: INTERNAL MEDICINE

## 2019-11-07 PROCEDURE — 99213 PR OFFICE/OUTPT VISIT, EST, LEVL III, 20-29 MIN: ICD-10-PCS | Mod: 25,S$GLB,, | Performed by: INTERNAL MEDICINE

## 2019-11-07 PROCEDURE — 3078F DIAST BP <80 MM HG: CPT | Mod: S$GLB,,, | Performed by: INTERNAL MEDICINE

## 2019-11-07 PROCEDURE — G0009 PNEUMOCOCCAL POLYSACCHARIDE VACCINE 23-VALENT =>2YO SQ IM: ICD-10-PCS | Mod: S$GLB,,, | Performed by: INTERNAL MEDICINE

## 2019-11-07 PROCEDURE — 3078F PR MOST RECENT DIASTOLIC BLOOD PRESSURE < 80 MM HG: ICD-10-PCS | Mod: S$GLB,,, | Performed by: INTERNAL MEDICINE

## 2019-11-07 PROCEDURE — 90662 FLU VACCINE - HIGH DOSE (65+) PRESERVATIVE FREE IM: ICD-10-PCS | Mod: S$GLB,,, | Performed by: INTERNAL MEDICINE

## 2019-11-07 PROCEDURE — 99213 OFFICE O/P EST LOW 20 MIN: CPT | Mod: 25,S$GLB,, | Performed by: INTERNAL MEDICINE

## 2019-11-07 PROCEDURE — G0008 ADMIN INFLUENZA VIRUS VAC: HCPCS | Mod: S$GLB,,, | Performed by: INTERNAL MEDICINE

## 2019-11-07 PROCEDURE — 90732 PPSV23 VACC 2 YRS+ SUBQ/IM: CPT | Mod: S$GLB,,, | Performed by: INTERNAL MEDICINE

## 2019-11-07 PROCEDURE — 90662 IIV NO PRSV INCREASED AG IM: CPT | Mod: S$GLB,,, | Performed by: INTERNAL MEDICINE

## 2019-11-07 PROCEDURE — 1101F PT FALLS ASSESS-DOCD LE1/YR: CPT | Mod: S$GLB,,, | Performed by: INTERNAL MEDICINE

## 2019-11-07 PROCEDURE — 3075F SYST BP GE 130 - 139MM HG: CPT | Mod: S$GLB,,, | Performed by: INTERNAL MEDICINE

## 2019-11-07 PROCEDURE — 1101F PR PT FALLS ASSESS DOC 0-1 FALLS W/OUT INJ PAST YR: ICD-10-PCS | Mod: S$GLB,,, | Performed by: INTERNAL MEDICINE

## 2019-11-07 PROCEDURE — G0009 ADMIN PNEUMOCOCCAL VACCINE: HCPCS | Mod: S$GLB,,, | Performed by: INTERNAL MEDICINE

## 2019-11-07 PROCEDURE — G0008 FLU VACCINE - HIGH DOSE (65+) PRESERVATIVE FREE IM: ICD-10-PCS | Mod: S$GLB,,, | Performed by: INTERNAL MEDICINE

## 2019-11-07 RX ORDER — PANTOPRAZOLE SODIUM 40 MG/1
40 TABLET, DELAYED RELEASE ORAL EVERY MORNING
Refills: 3 | COMMUNITY
Start: 2019-10-21 | End: 2020-06-09 | Stop reason: SDUPTHER

## 2019-11-07 RX ORDER — VALSARTAN 160 MG/1
160 TABLET ORAL DAILY
Refills: 3 | COMMUNITY
Start: 2019-10-21 | End: 2019-12-16

## 2019-11-07 RX ORDER — ALPRAZOLAM 0.25 MG/1
TABLET ORAL
Qty: 21 TABLET | Refills: 0 | Status: SHIPPED | OUTPATIENT
Start: 2019-11-07 | End: 2020-02-20 | Stop reason: SDUPTHER

## 2019-11-07 NOTE — PATIENT INSTRUCTIONS
Anxiety Reaction  Anxiety is the feeling we all get when we think something bad might happen. It is a normal response to stress and usually causes only a mild reaction. When anxiety becomes more severe, it can interfere with daily life. In some cases, you may not even be aware of what it is youre anxious about. There may also be a genetic link or it may be a learned behavior in the home.  Both psychological and physical triggers cause stress reaction. It's often a response to fear or emotional stress, real or imagined. This stress may come from home, family, work, or social relationships.  During an anxiety reaction, you may feel:  · Helpless  · Nervous  · Depressed  · Irritable  Your body may show signs of anxiety in many ways. You may experience:  · Dry mouth  · Shakiness  · Dizziness  · Weakness  · Trouble breathing  · Breathing fast (hyperventilating)  · Chest pressure  · Sweating  · Headache  · Nausea  · Diarrhea  · Tiredness  · Inability to sleep  · Sexual problems  Home care  · Try to locate the sources of stress in your life. They may not be obvious. These may include:  ¨ Daily hassles of life (traffic jams, missed appointments, car troubles, etc.)  ¨ Major life changes, both good (new baby, job promotion) and bad (loss of job, loss of loved one)  ¨ Overload: feeling that you have too many responsibilities and can't take care of all of them at once  ¨ Feeling helpless, feeling that your problems are beyond what youre able to solve  · Notice how your body reacts to stress. Learn to listen to your body signals. This will help you take action before the stress becomes severe.  · When you can, do something about the source of your stress. (Avoid hassles, limit the amount of change that happens in your life at one time and take a break when you feel overloaded).  · Unfortunately, many stressful situations can't be avoided. It is necessary to learn how to better manage stress. There are many proven methods  that will reduce your anxiety. These include simple things like exercise, good nutrition and adequate rest. Also, there are certain techniques that are helpful:  ¨ Relaxation  ¨ Breathing exercises  ¨ Visualization  ¨ Biofeedback  ¨ Meditation  For more information about this, consult your doctor or go to a local bookstore and review the many books and tapes available on this subject.  Follow-up care  If you feel that your anxiety is not responding to self-help measures, contact your doctor or make an appointment with a counselor. You may need short-term psychological counseling and temporary medicine to help you manage stress.  Call 911  Call your healthcare provider right away if any of these occur:  · Trouble breathing  · Confusion  · Drowsiness or trouble wakening  · Fainting or loss of consciousness  · Rapid heart rate  · Seizure  · New chest pain that becomes more severe, lasts longer, or spreads into your shoulder, arm, neck, jaw, or back  When to seek medical advice  Call your healthcare provider right away if any of these occur:  · Your symptoms get worse  · Severe headache not relieved by rest and mild pain reliever  Date Last Reviewed: 9/29/2015  © 2067-7895 [employer]. [address] [place] [id number]. All rights reserved. This information is not intended as a substitute for professional medical care. Always follow your healthcare professional's instructions.

## 2019-11-07 NOTE — PROGRESS NOTES
Subjective:       Patient ID: Bernardo Oliveira is a 84 y.o. male.    Chief Complaint: Anxiety (refills)    Mr. Oliveira is an 84-year-old male who comes for follow-up.  He has longstanding history of intermittent episodes of anxiety and panic like situation when he has to take Xanax.    He takes Xanax perhaps her twice a week and no more than 7 or 8 a month.  These are situations when feel somewhat stressed and somewhat restless.  He cannot specifically pinpoint as to those occasions generally has declined to take long-term medication but depends upon Xanax on the occasions.  He has stable dependency on these medications.  As on previous occasions I have added gain advised my office policy that controlled medications will be prescribed only on office visit and not on phone or at rest by pharmacy.    I do believe, after initial hesitation, that he and wife have finally understood our policy and did make an office visit to get refills on these medications.    Anxiety   Presents for follow-up visit. Symptoms include compulsions, excessive worry and nervous/anxious behavior. Patient reports no chest pain, decreased concentration, depressed mood, dizziness, irritability or restlessness. Symptoms occur occasionally. The severity of symptoms is moderate.           Past Medical History:   Diagnosis Date    Anxiety     Depression     GERD (gastroesophageal reflux disease)     Hyperlipidemia     Hypertension     Vocal cord cancer      Social History     Socioeconomic History    Marital status:      Spouse name: Not on file    Number of children: Not on file    Years of education: Not on file    Highest education level: Not on file   Occupational History    Occupation: Retired -  on ships   Social Needs    Financial resource strain: Not on file    Food insecurity:     Worry: Not on file     Inability: Not on file    Transportation needs:     Medical: Not on file     Non-medical: Not on file  "  Tobacco Use    Smoking status: Former Smoker     Packs/day: 1.50     Types: Cigarettes     Last attempt to quit: 1982     Years since quittin.4    Smokeless tobacco: Never Used   Substance and Sexual Activity    Alcohol use: Not Currently    Drug use: No    Sexual activity: Not Currently   Lifestyle    Physical activity:     Days per week: Not on file     Minutes per session: Not on file    Stress: Not at all   Relationships    Social connections:     Talks on phone: Not on file     Gets together: Not on file     Attends Yarsanism service: Not on file     Active member of club or organization: Not on file     Attends meetings of clubs or organizations: Not on file     Relationship status: Not on file   Other Topics Concern    Not on file   Social History Narrative    Not on file     Past Surgical History:   Procedure Laterality Date    CATARACT EXTRACTION, BILATERAL      CHOLECYSTECTOMY      CIRCUMCISION      INGUINAL HERNIA REPAIR Right 2000    INGUINAL HERNIA REPAIR Left     RETINAL DETACHMENT SURGERY      THROAT SURGERY      X 2 for vocal cord cancer    TONSILLECTOMY      TRANSURETHRAL RESECTION OF PROSTATE      dr cindi ryan - [facility]     History reviewed. No pertinent family history.    Review of Systems   Constitutional: Negative for activity change, diaphoresis, fever, irritability and unexpected weight change.   HENT: Negative for congestion and drooling.    Eyes: Negative for discharge and redness.   Respiratory: Negative for apnea, choking and chest tightness.    Cardiovascular: Negative for chest pain.   Gastrointestinal: Negative for abdominal distention, abdominal pain and anal bleeding.   Neurological: Negative for dizziness, seizures and headaches.   Psychiatric/Behavioral: Negative for behavioral problems and decreased concentration. The patient is nervous/anxious.          Objective:      Blood pressure 139/67, pulse 77, height 5' 8" (1.727 m), weight 74.8 kg " (165 lb). Body mass index is 25.09 kg/m².  Physical Exam   Constitutional: He appears well-developed and well-nourished. No distress.   HENT:   Head: Normocephalic and atraumatic.   Right Ear: Decreased hearing is noted.   Left Ear: Decreased hearing is noted.   Nose: Nose normal.   Mouth/Throat: No oropharyngeal exudate.   Hearing aid   Eyes: Conjunctivae and EOM are normal.   Neck: Neck supple. No JVD present. No tracheal deviation present. No thyromegaly present.   Cardiovascular: Normal rate, regular rhythm and normal heart sounds.   Pulmonary/Chest: Effort normal and breath sounds normal. No respiratory distress. He has no wheezes. He has no rales.   Abdominal: Soft. Bowel sounds are normal. He exhibits no distension. There is no tenderness.   Neurological: He is alert. He displays tremor.   Mild tremors- pt denies   Skin: Skin is warm and dry.   Senile freckles.    Psychiatric: His mood appears anxious. His speech is not rapid and/or pressured and not delayed.   Nursing note and vitals reviewed.        Assessment:       1. Anxiety disorder, unspecified type    2. Needs flu shot    3. Need for pneumococcal vaccination           Lab Visit on 08/21/2019   Component Date Value Ref Range Status    TSH 08/21/2019 0.580  0.340 - 5.600 uIU/mL Final    Sodium 08/21/2019 140  136 - 145 mmol/L Final    Potassium 08/21/2019 4.4  3.5 - 5.1 mmol/L Final    Chloride 08/21/2019 107  95 - 110 mmol/L Final    CO2 08/21/2019 28  23 - 29 mmol/L Final    Glucose 08/21/2019 91  70 - 110 mg/dL Final    BUN, Bld 08/21/2019 13  8 - 23 mg/dL Final    Creatinine 08/21/2019 1.0  0.5 - 1.4 mg/dL Final    Calcium 08/21/2019 8.9  8.7 - 10.5 mg/dL Final    Total Protein 08/21/2019 6.8  6.0 - 8.4 g/dL Final    Albumin 08/21/2019 3.6  3.5 - 5.2 g/dL Final    Total Bilirubin 08/21/2019 1.2* 0.1 - 1.0 mg/dL Final    Alkaline Phosphatase 08/21/2019 54* 55 - 135 U/L Final    AST 08/21/2019 18  10 - 40 U/L Final    ALT 08/21/2019 17   10 - 44 U/L Final    Anion Gap 08/21/2019 5* 8 - 16 mmol/L Final    eGFR if African American 08/21/2019 >60.0  >60 mL/min/1.73 m^2 Final    eGFR if non African American 08/21/2019 >60.0  >60 mL/min/1.73 m^2 Final    WBC 08/21/2019 6.34  3.90 - 12.70 K/uL Final    RBC 08/21/2019 4.84  4.60 - 6.20 M/uL Final    Hemoglobin 08/21/2019 14.3  14.0 - 18.0 g/dL Final    Hematocrit 08/21/2019 43.3  40.0 - 54.0 % Final    Mean Corpuscular Volume 08/21/2019 90  82 - 98 fL Final    Mean Corpuscular Hemoglobin 08/21/2019 29.5  27.0 - 31.0 pg Final    Mean Corpuscular Hemoglobin Conc 08/21/2019 33.0  32.0 - 36.0 g/dL Final    RDW 08/21/2019 13.5  11.5 - 14.5 % Final    Platelets 08/21/2019 152  150 - 350 K/uL Final    MPV 08/21/2019 10.7  9.2 - 12.9 fL Final    Immature Granulocytes 08/21/2019 0.2  0.0 - 0.5 % Final    Gran # (ANC) 08/21/2019 2.9  1.8 - 7.7 K/uL Final    Immature Grans (Abs) 08/21/2019 0.01  0.00 - 0.04 K/uL Final    Lymph # 08/21/2019 2.6  1.0 - 4.8 K/uL Final    Mono # 08/21/2019 0.6  0.3 - 1.0 K/uL Final    Eos # 08/21/2019 0.1  0.0 - 0.5 K/uL Final    Baso # 08/21/2019 0.06  0.00 - 0.20 K/uL Final    nRBC 08/21/2019 0  0 /100 WBC Final    Gran% 08/21/2019 46.4  38.0 - 73.0 % Final    Lymph% 08/21/2019 41.0  18.0 - 48.0 % Final    Mono% 08/21/2019 9.3  4.0 - 15.0 % Final    Eosinophil% 08/21/2019 2.2  0.0 - 8.0 % Final    Basophil% 08/21/2019 0.9  0.0 - 1.9 % Final    Differential Method 08/21/2019 Automated   Final    Sed Rate 08/21/2019 6  0 - 10 mm/Hr Final         Plan:           Anxiety disorder, unspecified type  -     ALPRAZolam (XANAX) 0.25 MG tablet; TAKE 1 TABLET BY MOUTH ONCE DAILY AS NEEDED FOR ANXIETY- Rx for 90 days  Dispense: 21 tablet; Refill: 0    Needs flu shot  -     Influenza - High Dose (65+) (PF) (IM)    Need for pneumococcal vaccination  -     Pneumococcal Polysaccharide Vaccine (23 Valent) (SQ/IM)      Patient does plan to make a trip to [place] for couple  months and he would like to have a long prescription.  I am allowing him 21 tablets of Xanax.  I have advised him to save but this medication.  No new prescription will be given earlier on.  If lost, he has to make a police report.    I have reviewed his state  database.  No other prescriber for controlled prescriptions.  Previous prescribed was Dr. Elliott Fernandez MD cardiology who has relocated.    He will be updated on influenza vaccination and pneumonia vaccination.    Follow-up in 3 months.      Current Outpatient Medications:     albuterol (VENTOLIN HFA) 90 mcg/actuation inhaler, Inhale 2 puffs into the lungs 2 (two) times daily. Rescue, Disp: 2 Inhaler, Rfl: 2    ALPRAZolam (XANAX) 0.25 MG tablet, TAKE 1 TABLET BY MOUTH ONCE DAILY AS NEEDED FOR ANXIETY- Rx for 90 days, Disp: 21 tablet, Rfl: 0    atorvastatin (LIPITOR) 40 MG tablet, Take 1 tablet (40 mg total) by mouth once daily., Disp: 90 tablet, Rfl: 1    azelastine (ASTELIN) 137 mcg (0.1 %) nasal spray, 1 spray (137 mcg total) by Nasal route 2 (two) times daily., Disp: 30 mL, Rfl: 2    lisinopril 10 MG tablet, TAKE 1 TABLET TWICE DAILY, Disp: 180 tablet, Rfl: 1    metoprolol succinate (TOPROL-XL) 25 MG 24 hr tablet, TAKE 1 TABLET EVERY DAY, Disp: 90 tablet, Rfl: 1    pantoprazole (PROTONIX) 40 MG tablet, Take 40 mg by mouth every morning., Disp: , Rfl: 3    ranitidine (ZANTAC) 150 MG tablet, TAKE 1 TABLET EVERY DAY AS NEEDED  FOR  HEARTBURN, Disp: 90 tablet, Rfl: 1    valsartan (DIOVAN) 160 MG tablet, Take 160 mg by mouth once daily., Disp: , Rfl: 3

## 2019-12-16 RX ORDER — LISINOPRIL 10 MG/1
TABLET ORAL
Qty: 180 TABLET | Refills: 2 | Status: SHIPPED | OUTPATIENT
Start: 2019-12-16 | End: 2020-05-26 | Stop reason: SDUPTHER

## 2020-02-12 ENCOUNTER — HOSPITAL ENCOUNTER (OUTPATIENT)
Dept: RADIOLOGY | Facility: HOSPITAL | Age: 85
Discharge: HOME | End: 2020-02-12
Attending: INTERNAL MEDICINE
Payer: MEDICARE

## 2020-02-12 ENCOUNTER — OFFICE VISIT (OUTPATIENT)
Dept: PULMONOLOGY | Facility: CLINIC | Age: 85
End: 2020-02-12
Payer: MEDICARE

## 2020-02-12 VITALS
HEIGHT: 68 IN | DIASTOLIC BLOOD PRESSURE: 70 MMHG | HEART RATE: 65 BPM | OXYGEN SATURATION: 99 % | WEIGHT: 162.19 LBS | BODY MASS INDEX: 24.58 KG/M2 | SYSTOLIC BLOOD PRESSURE: 138 MMHG

## 2020-02-12 DIAGNOSIS — R93.89 ABNORMAL CXR: ICD-10-CM

## 2020-02-12 DIAGNOSIS — J92.0 ASBESTOS-INDUCED PLEURAL PLAQUE: ICD-10-CM

## 2020-02-12 DIAGNOSIS — Z77.090 ASBESTOS EXPOSURE: ICD-10-CM

## 2020-02-12 DIAGNOSIS — R93.89 ABNORMAL CXR: Primary | ICD-10-CM

## 2020-02-12 PROCEDURE — 3078F DIAST BP <80 MM HG: CPT | Mod: S$GLB,,, | Performed by: INTERNAL MEDICINE

## 2020-02-12 PROCEDURE — 99213 OFFICE O/P EST LOW 20 MIN: CPT | Mod: S$GLB,,, | Performed by: INTERNAL MEDICINE

## 2020-02-12 PROCEDURE — 3075F SYST BP GE 130 - 139MM HG: CPT | Mod: S$GLB,,, | Performed by: INTERNAL MEDICINE

## 2020-02-12 PROCEDURE — 99213 PR OFFICE/OUTPT VISIT, EST, LEVL III, 20-29 MIN: ICD-10-PCS | Mod: S$GLB,,, | Performed by: INTERNAL MEDICINE

## 2020-02-12 PROCEDURE — 1101F PT FALLS ASSESS-DOCD LE1/YR: CPT | Mod: S$GLB,,, | Performed by: INTERNAL MEDICINE

## 2020-02-12 PROCEDURE — 1159F PR MEDICATION LIST DOCUMENTED IN MEDICAL RECORD: ICD-10-PCS | Mod: S$GLB,,, | Performed by: INTERNAL MEDICINE

## 2020-02-12 PROCEDURE — 71046 X-RAY EXAM CHEST 2 VIEWS: CPT | Mod: TC

## 2020-02-12 PROCEDURE — 3075F PR MOST RECENT SYSTOLIC BLOOD PRESS GE 130-139MM HG: ICD-10-PCS | Mod: S$GLB,,, | Performed by: INTERNAL MEDICINE

## 2020-02-12 PROCEDURE — 1159F MED LIST DOCD IN RCRD: CPT | Mod: S$GLB,,, | Performed by: INTERNAL MEDICINE

## 2020-02-12 PROCEDURE — 3078F PR MOST RECENT DIASTOLIC BLOOD PRESSURE < 80 MM HG: ICD-10-PCS | Mod: S$GLB,,, | Performed by: INTERNAL MEDICINE

## 2020-02-12 PROCEDURE — 1101F PR PT FALLS ASSESS DOC 0-1 FALLS W/OUT INJ PAST YR: ICD-10-PCS | Mod: S$GLB,,, | Performed by: INTERNAL MEDICINE

## 2020-02-12 NOTE — PROGRESS NOTES
Office Visit    Patient Name: Bernardo Oliveira  MRN: [id number]  : 1935      Reason for visit: Abnormal CXR    HPI:     2018 - 81 yo male was in Urgent Care with bronchitis had CXR showig a lung nodule.  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  We reviewed old films in [facility] - CXR in  looks unchanged ad that report states that it is unchanged since  (making this stable for about 11 years).  He has no symptoms.  He did smoke (probably < 20 pack years and quit > 40 years ago).  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    2019 - Here for follow up, stable, no new respiratory complaints.  Has not been in ER or hospitalized.  Had a sinus infection (or possibly the flu) in January - better now.    2020 - Here for follow up, has done well over the last year - no hospitalizations, no new diagnoses.  No respiratory symptoms - he remains active (mowing the lawn).  Did travel to [place] - no known exposure to corona virus.    Flu and Pneumonia Vaccination    I have recommended that the patient get this years influenza vaccine.  We discussed the risks and benefits of this treatment.  Got  vaccine    I reviewed patient's current pneumonia vaccine status.  Patient is current by his report.  We have discussed the current guidelines and recommendations for pneumonia vaccination.      Past Medical History    Past Medical History:   Diagnosis Date    Anxiety     Depression     GERD (gastroesophageal reflux disease)     Hyperlipidemia     Hypertension     Vocal cord cancer        Past Surgical History    Past Surgical History:   Procedure Laterality Date    CATARACT EXTRACTION, BILATERAL      CHOLECYSTECTOMY      CIRCUMCISION      INGUINAL HERNIA REPAIR Right     INGUINAL HERNIA REPAIR Left     RETINAL DETACHMENT SURGERY      THROAT SURGERY      X 2 for vocal cord cancer     TONSILLECTOMY      TRANSURETHRAL RESECTION OF PROSTATE      dr cindi ryan - [facility]       Medications      Current Outpatient Medications:     ALPRAZolam (XANAX) 0.25 MG tablet, TAKE 1 TABLET BY MOUTH ONCE DAILY AS NEEDED FOR ANXIETY- Rx for 90 days, Disp: 21 tablet, Rfl: 0    atorvastatin (LIPITOR) 40 MG tablet, Take 1 tablet (40 mg total) by mouth once daily., Disp: 90 tablet, Rfl: 1    lisinopril 10 MG tablet, TAKE 1 TABLET TWICE DAILY, Disp: 180 tablet, Rfl: 2    metoprolol succinate (TOPROL-XL) 25 MG 24 hr tablet, TAKE 1 TABLET EVERY DAY, Disp: 90 tablet, Rfl: 1    pantoprazole (PROTONIX) 40 MG tablet, Take 40 mg by mouth every morning., Disp: , Rfl: 3    albuterol (VENTOLIN HFA) 90 mcg/actuation inhaler, Inhale 2 puffs into the lungs 2 (two) times daily. Rescue (Patient not taking: Reported on 2020), Disp: 2 Inhaler, Rfl: 2    azelastine (ASTELIN) 137 mcg (0.1 %) nasal spray, 1 spray (137 mcg total) by Nasal route 2 (two) times daily. (Patient not taking: Reported on 2020), Disp: 30 mL, Rfl: 2    ranitidine (ZANTAC) 150 MG tablet, TAKE 1 TABLET EVERY DAY AS NEEDED  FOR  HEARTBURN (Patient not taking: Reported on 2020), Disp: 90 tablet, Rfl: 1    Allergies    Review of patient's allergies indicates:   Allergen Reactions    Codeine        SocHx    Social History     Tobacco Use   Smoking Status Former Smoker    Packs/day: 1.50    Types: Cigarettes    Last attempt to quit: 1982    Years since quittin.7   Smokeless Tobacco Never Used       Social History     Substance and Sexual Activity   Alcohol Use Not Currently       Drug Use - no  Occupation - retired, worked as  on [employer]  Asbestos exposure - +    Review of Systems  Review of Systems   Constitutional: Negative for chills, diaphoresis, fever, malaise/fatigue and weight loss.   HENT: Negative for congestion.    Eyes: Negative for pain.   Respiratory: Negative for cough, hemoptysis, sputum  "production, shortness of breath, wheezing and stridor.    Cardiovascular: Negative for chest pain, palpitations, orthopnea, claudication, leg swelling and PND.   Gastrointestinal: Negative for abdominal pain, constipation, diarrhea, heartburn, nausea and vomiting.   Genitourinary: Negative for dysuria, frequency and urgency.   Musculoskeletal: Negative for falls and myalgias.   Neurological: Negative for sensory change, focal weakness and weakness.   Psychiatric/Behavioral: Negative for depression. The patient is not nervous/anxious.        Physical Exam    Vitals:    02/12/20 0855   BP: 138/70   Pulse: 65   SpO2: 99%   Weight: 73.6 kg (162 lb 3.2 oz)   Height: 5' 8" (1.727 m)       Physical Exam   Constitutional: He is oriented to person, place, and time. He appears well-developed and well-nourished. No distress.   HENT:   Head: Normocephalic and atraumatic.   Right Ear: External ear normal.   Left Ear: External ear normal.   Nose: Nose normal.   Mouth/Throat: Oropharynx is clear and moist.   Eyes: Pupils are equal, round, and reactive to light. EOM are normal.   Neck: Normal range of motion. Neck supple. No JVD present. No tracheal deviation present. No thyromegaly present.   Cardiovascular: Normal rate, regular rhythm, normal heart sounds and intact distal pulses. Exam reveals no gallop and no friction rub.   No murmur heard.  Pulmonary/Chest: Effort normal and breath sounds normal. No stridor. No respiratory distress. He has no wheezes. He has no rales. He exhibits no tenderness.   Abdominal: Soft. Bowel sounds are normal. He exhibits no distension.   Musculoskeletal: Normal range of motion. He exhibits no edema or tenderness.   Lymphadenopathy:     He has no cervical adenopathy.   Neurological: He is alert and oriented to person, place, and time. He has normal reflexes. No cranial nerve deficit.   Skin: He is not diaphoretic.   Psychiatric: He has a normal mood and affect. His behavior is normal.   Nursing note " and vitals reviewed.      Labs    Lab Results   Component Value Date    WBC 6.34 08/21/2019    HGB 14.3 08/21/2019    HCT 43.3 08/21/2019     08/21/2019       Sodium   Date Value Ref Range Status   08/21/2019 140 136 - 145 mmol/L Final   02/15/2019 140 134 - 144 mmol/L      Potassium   Date Value Ref Range Status   08/21/2019 4.4 3.5 - 5.1 mmol/L Final     Chloride   Date Value Ref Range Status   08/21/2019 107 95 - 110 mmol/L Final   02/15/2019 105 98 - 110 mmol/L      CO2   Date Value Ref Range Status   08/21/2019 28 23 - 29 mmol/L Final     Glucose   Date Value Ref Range Status   08/21/2019 91 70 - 110 mg/dL Final   02/15/2019 98 70 - 99 mg/dL      BUN, Bld   Date Value Ref Range Status   08/21/2019 13 8 - 23 mg/dL Final     Creatinine   Date Value Ref Range Status   08/21/2019 1.0 0.5 - 1.4 mg/dL Final   02/15/2019 0.98 0.60 - 1.40 mg/dL    05/28/2012 0.8 0.2 - 1.4 mg/dl Final     Calcium   Date Value Ref Range Status   08/21/2019 8.9 8.7 - 10.5 mg/dL Final   05/28/2012 9.3 8.6 - 10.2 mg/dl Final     Total Protein   Date Value Ref Range Status   08/21/2019 6.8 6.0 - 8.4 g/dL Final     Albumin   Date Value Ref Range Status   08/21/2019 3.6 3.5 - 5.2 g/dL Final   02/15/2019 3.9 3.1 - 4.7 g/dL      Total Bilirubin   Date Value Ref Range Status   08/21/2019 1.2 (H) 0.1 - 1.0 mg/dL Final     Comment:     For infants and newborns, interpretation of results should be based  on gestational age, weight and in agreement with clinical  observations.  Premature Infant recommended reference ranges:  Up to 24 hours.............<8.0 mg/dL  Up to 48 hours............<12.0 mg/dL  3-5 days..................<15.0 mg/dL  6-29 days.................<15.0 mg/dL       Alkaline Phosphatase   Date Value Ref Range Status   08/21/2019 54 (L) 55 - 135 U/L Final   05/28/2012 64 23 - 119 UNIT/L Final     AST   Date Value Ref Range Status   08/21/2019 18 10 - 40 U/L Final   05/28/2012 17 10 - 34 UNIT/L Final     ALT   Date Value Ref  Range Status   08/21/2019 17 10 - 44 U/L Final     Anion Gap   Date Value Ref Range Status   08/21/2019 5 (L) 8 - 16 mmol/L Final   05/28/2012 7 5 - 15 meq/L Final       Xrays    CT OF THE CHEST WITHOUT  INTRAVENOUS CONTRAST    CLINICAL INFORMATION: Abnormal chest x-ray.    COMPARISON STUDIES: Chest x-rays dated 2/12/2019, 8/27/2015 and 2/11/2013.    FINDINGS : There are minimal arteriosclerotic changes in the aorta without  aneurysm.    No mediastinal mass are lymphadenopathy noted.    Tracheobronchial tree, heart and pericardium are normal.    There are several small pleural calcified plaques anteriorly in the right upper  lobe, some of which were seen on the chest x-ray and account for the chest  x-ray findings. No distinct mass is identified.    Lungs are clear with no lung nodule, mass, groundglass or airspace opacities.    There is no pleural effusion.    The pleura in the rest of the chest is normal.    No osseous abnormality seen.    There are multiple benign cortical and peripelvic right renal cyst in the  visualized portion of the right upper kidney.        IMPRESSION:    1. Opacity seen on recent chest x-ray represents calcified pleural plaque.  The calcifications were also seen on previous chest x-rays dating back to  2/11/2013. It may have been slightly more prominently seen on the current chest  x-ray due to slight enlargement of the soft tissue component of the over the  past several years and perhaps also due to technical factors, such as x-ray  exposure factors and differences in positioning of patient.  This may be related to asbestos exposure if applicable.    2. No other significant findings.    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

## 2020-02-17 RX ORDER — METOPROLOL SUCCINATE 25 MG/1
TABLET, EXTENDED RELEASE ORAL
Qty: 90 TABLET | Refills: 1 | Status: SHIPPED | OUTPATIENT
Start: 2020-02-17 | End: 2020-08-05

## 2020-02-20 ENCOUNTER — OFFICE VISIT (OUTPATIENT)
Dept: FAMILY MEDICINE | Facility: CLINIC | Age: 85
End: 2020-02-20
Payer: MEDICARE

## 2020-02-20 VITALS
WEIGHT: 163 LBS | HEIGHT: 68 IN | HEART RATE: 63 BPM | SYSTOLIC BLOOD PRESSURE: 139 MMHG | BODY MASS INDEX: 24.71 KG/M2 | DIASTOLIC BLOOD PRESSURE: 88 MMHG

## 2020-02-20 DIAGNOSIS — F41.9 ANXIETY DISORDER, UNSPECIFIED TYPE: ICD-10-CM

## 2020-02-20 DIAGNOSIS — I10 ESSENTIAL HYPERTENSION: Primary | ICD-10-CM

## 2020-02-20 DIAGNOSIS — E78.2 MIXED DYSLIPIDEMIA: ICD-10-CM

## 2020-02-20 PROCEDURE — 99213 PR OFFICE/OUTPT VISIT, EST, LEVL III, 20-29 MIN: ICD-10-PCS | Mod: S$GLB,,, | Performed by: INTERNAL MEDICINE

## 2020-02-20 PROCEDURE — 1126F PR PAIN SEVERITY QUANTIFIED, NO PAIN PRESENT: ICD-10-PCS | Mod: S$GLB,,, | Performed by: INTERNAL MEDICINE

## 2020-02-20 PROCEDURE — 3075F SYST BP GE 130 - 139MM HG: CPT | Mod: S$GLB,,, | Performed by: INTERNAL MEDICINE

## 2020-02-20 PROCEDURE — 99213 OFFICE O/P EST LOW 20 MIN: CPT | Mod: S$GLB,,, | Performed by: INTERNAL MEDICINE

## 2020-02-20 PROCEDURE — 1101F PT FALLS ASSESS-DOCD LE1/YR: CPT | Mod: S$GLB,,, | Performed by: INTERNAL MEDICINE

## 2020-02-20 PROCEDURE — 1159F MED LIST DOCD IN RCRD: CPT | Mod: S$GLB,,, | Performed by: INTERNAL MEDICINE

## 2020-02-20 PROCEDURE — 3075F PR MOST RECENT SYSTOLIC BLOOD PRESS GE 130-139MM HG: ICD-10-PCS | Mod: S$GLB,,, | Performed by: INTERNAL MEDICINE

## 2020-02-20 PROCEDURE — 1126F AMNT PAIN NOTED NONE PRSNT: CPT | Mod: S$GLB,,, | Performed by: INTERNAL MEDICINE

## 2020-02-20 PROCEDURE — 3079F PR MOST RECENT DIASTOLIC BLOOD PRESSURE 80-89 MM HG: ICD-10-PCS | Mod: S$GLB,,, | Performed by: INTERNAL MEDICINE

## 2020-02-20 PROCEDURE — 1170F FXNL STATUS ASSESSED: CPT | Mod: S$GLB,,, | Performed by: INTERNAL MEDICINE

## 2020-02-20 PROCEDURE — 3079F DIAST BP 80-89 MM HG: CPT | Mod: S$GLB,,, | Performed by: INTERNAL MEDICINE

## 2020-02-20 PROCEDURE — 1170F PR FUNCTIONAL STATUS ASSESSED: ICD-10-PCS | Mod: S$GLB,,, | Performed by: INTERNAL MEDICINE

## 2020-02-20 PROCEDURE — 1101F PR PT FALLS ASSESS DOC 0-1 FALLS W/OUT INJ PAST YR: ICD-10-PCS | Mod: S$GLB,,, | Performed by: INTERNAL MEDICINE

## 2020-02-20 PROCEDURE — 1159F PR MEDICATION LIST DOCUMENTED IN MEDICAL RECORD: ICD-10-PCS | Mod: S$GLB,,, | Performed by: INTERNAL MEDICINE

## 2020-02-20 RX ORDER — ALPRAZOLAM 0.25 MG/1
TABLET ORAL
Qty: 21 TABLET | Refills: 0 | Status: SHIPPED | OUTPATIENT
Start: 2020-02-20 | End: 2020-05-26 | Stop reason: SDUPTHER

## 2020-02-20 RX ORDER — VALSARTAN 160 MG/1
160 TABLET ORAL DAILY
COMMUNITY
Start: 2020-01-07 | End: 2020-05-26 | Stop reason: ALTCHOICE

## 2020-02-20 NOTE — PATIENT INSTRUCTIONS
Anxiety Reaction  Anxiety is the feeling we all get when we think something bad might happen. It is a normal response to stress and usually causes only a mild reaction. When anxiety becomes more severe, it can interfere with daily life. In some cases, you may not even be aware of what it is youre anxious about. There may also be a genetic link or it may be a learned behavior in the home.  Both psychological and physical triggers cause stress reaction. It's often a response to fear or emotional stress, real or imagined. This stress may come from home, family, work, or social relationships.  During an anxiety reaction, you may feel:  · Helpless  · Nervous  · Depressed  · Irritable  Your body may show signs of anxiety in many ways. You may experience:  · Dry mouth  · Shakiness  · Dizziness  · Weakness  · Trouble breathing  · Breathing fast (hyperventilating)  · Chest pressure  · Sweating  · Headache  · Nausea  · Diarrhea  · Tiredness  · Inability to sleep  · Sexual problems  Home care  · Try to locate the sources of stress in your life. They may not be obvious. These may include:  ¨ Daily hassles of life (traffic jams, missed appointments, car troubles, etc.)  ¨ Major life changes, both good (new baby, job promotion) and bad (loss of job, loss of loved one)  ¨ Overload: feeling that you have too many responsibilities and can't take care of all of them at once  ¨ Feeling helpless, feeling that your problems are beyond what youre able to solve  · Notice how your body reacts to stress. Learn to listen to your body signals. This will help you take action before the stress becomes severe.  · When you can, do something about the source of your stress. (Avoid hassles, limit the amount of change that happens in your life at one time and take a break when you feel overloaded).  · Unfortunately, many stressful situations can't be avoided. It is necessary to learn how to better manage stress. There are many proven methods  that will reduce your anxiety. These include simple things like exercise, good nutrition and adequate rest. Also, there are certain techniques that are helpful:  ¨ Relaxation  ¨ Breathing exercises  ¨ Visualization  ¨ Biofeedback  ¨ Meditation  For more information about this, consult your doctor or go to a local bookstore and review the many books and tapes available on this subject.  Follow-up care  If you feel that your anxiety is not responding to self-help measures, contact your doctor or make an appointment with a counselor. You may need short-term psychological counseling and temporary medicine to help you manage stress.  Call 911  Call your healthcare provider right away if any of these occur:  · Trouble breathing  · Confusion  · Drowsiness or trouble wakening  · Fainting or loss of consciousness  · Rapid heart rate  · Seizure  · New chest pain that becomes more severe, lasts longer, or spreads into your shoulder, arm, neck, jaw, or back  When to seek medical advice  Call your healthcare provider right away if any of these occur:  · Your symptoms get worse  · Severe headache not relieved by rest and mild pain reliever  Date Last Reviewed: 9/29/2015  © 9772-0066 [employer]. [address] [place] [id number]. All rights reserved. This information is not intended as a substitute for professional medical care. Always follow your healthcare professional's instructions.

## 2020-02-20 NOTE — PROGRESS NOTES
Subjective:       Patient ID: Bernardo Oliveira is a 84 y.o. male.    Chief Complaint: Anxiety; Hypertension; and Hyperlipidemia    Mr. Oliveira is a 84-year-old gentleman who comes for follow-up.  Underlying medical issues of hypertension, gastroesophageal reflux, hyperlipidemia and chronic intermittent anxiety has been noted.    He takes a combination of lisinopril and valsartan for hypertension.  Apparently lisinopril was prescribed in past by Dr. Fernandez who has left and valsartan it has been prescribed by Dr. Johnson who is his current cardiologist.  I have again advised patient and his wife that they should check with Dr. Johnson whether this combination of lisinopril and valsartan is appropriate or not.    He recently had gone to [facility] meet his family members and had a good time there.  His family members advised him to stay there but he quickly came back to [place] as soon as possible.    His blood pressures are either borderline or slightly elevated at home.    As far as anxiety is concerned, he gets sometimes compulsive and worried.  He takes Xanax perhaps once or twice a week.  Last prescription of 21 Xanax pills lasted about 2 or 3 months.    Anxiety   Presents for follow-up visit. Symptoms include compulsions, decreased concentration, excessive worry, insomnia and nervous/anxious behavior. Patient reports no chest pain or dizziness. Symptoms occur most days. The severity of symptoms is moderate.       Hypertension   This is a chronic problem. The problem has been waxing and waning since onset. Associated symptoms include anxiety. Pertinent negatives include no chest pain or headaches. Risk factors for coronary artery disease include sedentary lifestyle. The current treatment provides moderate improvement.   Hyperlipidemia   This is a chronic problem. The current episode started more than 1 year ago. The problem is controlled. He has no history of obesity. Pertinent negatives include no chest pain.  Current antihyperlipidemic treatment includes statins. The current treatment provides moderate improvement of lipids. Risk factors for coronary artery disease include a sedentary lifestyle, hypertension and dyslipidemia.       Past Medical History:   Diagnosis Date    Anxiety     Asbestos-induced pleural plaque 2020    Depression     GERD (gastroesophageal reflux disease)     Hyperlipidemia     Hypertension     Vocal cord cancer      Social History     Socioeconomic History    Marital status:      Spouse name: Not on file    Number of children: Not on file    Years of education: Not on file    Highest education level: Not on file   Occupational History    Occupation: Retired -  on [employer]   Social Needs    Financial resource strain: Not on file    Food insecurity:     Worry: Not on file     Inability: Not on file    Transportation needs:     Medical: Not on file     Non-medical: Not on file   Tobacco Use    Smoking status: Former Smoker     Packs/day: 1.50     Types: Cigarettes     Last attempt to quit: 1982     Years since quittin.7    Smokeless tobacco: Never Used   Substance and Sexual Activity    Alcohol use: Not Currently    Drug use: No    Sexual activity: Not Currently   Lifestyle    Physical activity:     Days per week: Not on file     Minutes per session: Not on file    Stress: Not at all   Relationships    Social connections:     Talks on phone: Not on file     Gets together: Not on file     Attends Yarsani service: Not on file     Active member of club or organization: Not on file     Attends meetings of clubs or organizations: Not on file     Relationship status: Not on file   Other Topics Concern    Not on file   Social History Narrative    Not on file     Past Surgical History:   Procedure Laterality Date    CATARACT EXTRACTION, BILATERAL      CHOLECYSTECTOMY      CIRCUMCISION      INGUINAL HERNIA REPAIR Right 2000    INGUINAL HERNIA  "REPAIR Left 1995    RETINAL DETACHMENT SURGERY      THROAT SURGERY      X 2 for vocal cord cancer    TONSILLECTOMY      TRANSURETHRAL RESECTION OF PROSTATE  2004    dr cindi ryan - [facility]     History reviewed. No pertinent family history.    Review of Systems   Constitutional: Negative for activity change, diaphoresis, fever and unexpected weight change.   HENT: Negative for congestion and drooling.    Eyes: Negative for discharge and redness.   Respiratory: Negative for apnea, choking and chest tightness.    Cardiovascular: Negative for chest pain.   Gastrointestinal: Negative for abdominal distention, abdominal pain and anal bleeding.   Neurological: Negative for dizziness, seizures and headaches.   Psychiatric/Behavioral: Positive for decreased concentration. Negative for agitation and behavioral problems. The patient is nervous/anxious and has insomnia.          Objective:      Blood pressure 139/88, pulse 63, height 5' 8" (1.727 m), weight 73.9 kg (163 lb). Body mass index is 24.78 kg/m².  Physical Exam   Constitutional: He appears well-developed and well-nourished. No distress.   HENT:   Head: Normocephalic and atraumatic.   Right Ear: Decreased hearing is noted.   Left Ear: Decreased hearing is noted.   Nose: Nose normal.   Mouth/Throat: No oropharyngeal exudate.   Hearing aid   Eyes: Conjunctivae and EOM are normal.   Neck: Neck supple. No JVD present. No tracheal deviation present. No thyromegaly present.   Cardiovascular: Normal rate, regular rhythm and normal heart sounds.   Pulmonary/Chest: Effort normal and breath sounds normal. No respiratory distress. He has no wheezes. He has no rales.   Abdominal: Soft. Bowel sounds are normal. He exhibits no distension. There is no tenderness.   Neurological: He is alert. He displays tremor.   Mild tremors- pt denies   Skin: Skin is warm and dry.   Senile freckles.    Psychiatric: His mood appears anxious. His speech is not rapid and/or pressured and not " delayed.   Nursing note and vitals reviewed.        Assessment:       1. Essential hypertension    2. Anxiety disorder, unspecified type    3. Mixed dyslipidemia           No visits with results within 3 Month(s) from this visit.   Latest known visit with results is:   Lab Visit on 08/21/2019   Component Date Value Ref Range Status    TSH 08/21/2019 0.580  0.340 - 5.600 uIU/mL Final    Sodium 08/21/2019 140  136 - 145 mmol/L Final    Potassium 08/21/2019 4.4  3.5 - 5.1 mmol/L Final    Chloride 08/21/2019 107  95 - 110 mmol/L Final    CO2 08/21/2019 28  23 - 29 mmol/L Final    Glucose 08/21/2019 91  70 - 110 mg/dL Final    BUN, Bld 08/21/2019 13  8 - 23 mg/dL Final    Creatinine 08/21/2019 1.0  0.5 - 1.4 mg/dL Final    Calcium 08/21/2019 8.9  8.7 - 10.5 mg/dL Final    Total Protein 08/21/2019 6.8  6.0 - 8.4 g/dL Final    Albumin 08/21/2019 3.6  3.5 - 5.2 g/dL Final    Total Bilirubin 08/21/2019 1.2* 0.1 - 1.0 mg/dL Final    Alkaline Phosphatase 08/21/2019 54* 55 - 135 U/L Final    AST 08/21/2019 18  10 - 40 U/L Final    ALT 08/21/2019 17  10 - 44 U/L Final    Anion Gap 08/21/2019 5* 8 - 16 mmol/L Final    eGFR if African American 08/21/2019 >60.0  >60 mL/min/1.73 m^2 Final    eGFR if non African American 08/21/2019 >60.0  >60 mL/min/1.73 m^2 Final    WBC 08/21/2019 6.34  3.90 - 12.70 K/uL Final    RBC 08/21/2019 4.84  4.60 - 6.20 M/uL Final    Hemoglobin 08/21/2019 14.3  14.0 - 18.0 g/dL Final    Hematocrit 08/21/2019 43.3  40.0 - 54.0 % Final    Mean Corpuscular Volume 08/21/2019 90  82 - 98 fL Final    Mean Corpuscular Hemoglobin 08/21/2019 29.5  27.0 - 31.0 pg Final    Mean Corpuscular Hemoglobin Conc 08/21/2019 33.0  32.0 - 36.0 g/dL Final    RDW 08/21/2019 13.5  11.5 - 14.5 % Final    Platelets 08/21/2019 152  150 - 350 K/uL Final    MPV 08/21/2019 10.7  9.2 - 12.9 fL Final    Immature Granulocytes 08/21/2019 0.2  0.0 - 0.5 % Final    Gran # (ANC) 08/21/2019 2.9  1.8 - 7.7 K/uL  Final    Immature Grans (Abs) 08/21/2019 0.01  0.00 - 0.04 K/uL Final    Lymph # 08/21/2019 2.6  1.0 - 4.8 K/uL Final    Mono # 08/21/2019 0.6  0.3 - 1.0 K/uL Final    Eos # 08/21/2019 0.1  0.0 - 0.5 K/uL Final    Baso # 08/21/2019 0.06  0.00 - 0.20 K/uL Final    nRBC 08/21/2019 0  0 /100 WBC Final    Gran% 08/21/2019 46.4  38.0 - 73.0 % Final    Lymph% 08/21/2019 41.0  18.0 - 48.0 % Final    Mono% 08/21/2019 9.3  4.0 - 15.0 % Final    Eosinophil% 08/21/2019 2.2  0.0 - 8.0 % Final    Basophil% 08/21/2019 0.9  0.0 - 1.9 % Final    Differential Method 08/21/2019 Automated   Final    Sed Rate 08/21/2019 6  0 - 10 mm/Hr Final         Plan:           Essential hypertension    Anxiety disorder, unspecified type  -     ALPRAZolam (XANAX) 0.25 MG tablet; TAKE 1 TABLET BY MOUTH ONCE DAILY AS NEEDED FOR ANXIETY- Rx for 90 days  Dispense: 21 tablet; Refill: 0    Mixed dyslipidemia      Patient has been advised to watch diet and exercise. Avoid fried and fatty food. Compliance to medications and follow up urged.    Advised Mr. Oliveira about age and season appropriate immunizations/ cancer screenings.  Also seasonal influenza vaccine, update on tetanus diphtheria vaccination every 10 years.        Question white coat hypertension.  Gets anxious when he sees doctor's office.  New prescription given for alprazolam for limited time.  Follow-up in 3 months.  Check with Dr. Johnson if combination of fall certain and lisinopril appropriate.  Probably not but I am not sure how willing patient will be to changes medication.      Current Outpatient Medications:     ALPRAZolam (XANAX) 0.25 MG tablet, TAKE 1 TABLET BY MOUTH ONCE DAILY AS NEEDED FOR ANXIETY- Rx for 90 days, Disp: 21 tablet, Rfl: 0    atorvastatin (LIPITOR) 40 MG tablet, Take 1 tablet (40 mg total) by mouth once daily., Disp: 90 tablet, Rfl: 1    lisinopril 10 MG tablet, TAKE 1 TABLET TWICE DAILY, Disp: 180 tablet, Rfl: 2    metoprolol succinate (TOPROL-XL)  25 MG 24 hr tablet, TAKE 1 TABLET EVERY DAY, Disp: 90 tablet, Rfl: 1    pantoprazole (PROTONIX) 40 MG tablet, Take 40 mg by mouth every morning., Disp: , Rfl: 3    valsartan (DIOVAN) 160 MG tablet, Take 160 mg by mouth once daily., Disp: , Rfl:

## 2020-03-28 ENCOUNTER — TELEPHONE (OUTPATIENT)
Dept: FAMILY MEDICINE | Facility: CLINIC | Age: 85
End: 2020-03-28

## 2020-03-28 DIAGNOSIS — E78.2 MIXED DYSLIPIDEMIA: ICD-10-CM

## 2020-03-28 DIAGNOSIS — E53.8 DEFICIENCY OF VITAMIN B12: ICD-10-CM

## 2020-03-28 DIAGNOSIS — E55.9 VITAMIN D DEFICIENCY: ICD-10-CM

## 2020-03-28 DIAGNOSIS — I10 ESSENTIAL HYPERTENSION: Primary | ICD-10-CM

## 2020-03-28 NOTE — TELEPHONE ENCOUNTER
----- Message from Rivka Moulton LPN sent at 2/20/2020  3:35 PM CST -----      ----- Message -----  From: Stephanie Hernandez  Sent: 2/20/2020   3:19 PM CST  To: Nya Swan Staff    At check out, patient's wife asked if Dr. Fay wanted to order labs for when he comes back in May. I checked his chart and he has not had any drawn since 08/2019.     I have sent patient lab requests to [facility] for chemistry, cholesterol migraine, vitamin-D and vitamin B12.  He should do this test fasting a few days before his follow-up.

## 2020-05-04 ENCOUNTER — TELEPHONE (OUTPATIENT)
Dept: FAMILY MEDICINE | Facility: CLINIC | Age: 85
End: 2020-05-04

## 2020-05-04 DIAGNOSIS — Z13.6 SCREENING FOR AAA (ABDOMINAL AORTIC ANEURYSM): Primary | ICD-10-CM

## 2020-05-04 DIAGNOSIS — Z23 NEED FOR SHINGLES VACCINE: ICD-10-CM

## 2020-05-04 NOTE — TELEPHONE ENCOUNTER
Pt. is ok with getting a Shingles Vaccine.    I have sent a prescription for shingles vaccine 2 part to [facility].  I do not think [facility] stocks shingles vaccine and hence I have printed another prescription and it is on your desk.

## 2020-05-04 NOTE — TELEPHONE ENCOUNTER
----- Message from Beny Fay MD sent at 5/4/2020  1:00 PM CDT -----  Regarding: [place] recommendation  Pend order for AAA screen and notify patient.  This is as per [place] recommendations.  Also ask him if he would be interested in shingles vaccine.  ----- Message -----  From: Rivka Moulton LPN  Sent: 5/4/2020   9:25 AM CDT  To: Beny Fay MD

## 2020-05-04 NOTE — TELEPHONE ENCOUNTER
----- Message from Beny Fay MD sent at 5/4/2020  1:00 PM CDT -----  Regarding: [place] recommendation  Pend order for AAA screen and notify patient.  This is as per [place] recommendations.  Also ask him if he would be interested in shingles vaccine.  ----- Message -----  From: Rivka Moulton LPN  Sent: 5/4/2020   9:25 AM CDT  To: Beny Fay MD    I have sent an order for AAA screening to the patient's [facility] lab.

## 2020-05-19 ENCOUNTER — TELEPHONE (OUTPATIENT)
Dept: FAMILY MEDICINE | Facility: CLINIC | Age: 85
End: 2020-05-19

## 2020-05-19 ENCOUNTER — HOSPITAL ENCOUNTER (OUTPATIENT)
Dept: RADIOLOGY | Facility: HOSPITAL | Age: 85
Discharge: HOME | End: 2020-05-19
Attending: INTERNAL MEDICINE

## 2020-05-19 ENCOUNTER — LAB VISIT (OUTPATIENT)
Dept: LAB | Facility: HOSPITAL | Age: 85
End: 2020-05-19
Attending: INTERNAL MEDICINE
Payer: MEDICARE

## 2020-05-19 DIAGNOSIS — E78.2 MIXED DYSLIPIDEMIA: ICD-10-CM

## 2020-05-19 DIAGNOSIS — Z13.6 SCREENING FOR AAA (ABDOMINAL AORTIC ANEURYSM): ICD-10-CM

## 2020-05-19 DIAGNOSIS — I10 ESSENTIAL HYPERTENSION: ICD-10-CM

## 2020-05-19 DIAGNOSIS — E55.9 VITAMIN D DEFICIENCY: ICD-10-CM

## 2020-05-19 DIAGNOSIS — E53.8 DEFICIENCY OF VITAMIN B12: ICD-10-CM

## 2020-05-19 LAB
25(OH)D3+25(OH)D2 SERPL-MCNC: 29 NG/ML (ref 30–96)
ALBUMIN SERPL BCP-MCNC: 3.9 G/DL (ref 3.5–5.2)
ALP SERPL-CCNC: 68 U/L (ref 55–135)
ALT SERPL W/O P-5'-P-CCNC: 55 U/L (ref 10–44)
ANION GAP SERPL CALC-SCNC: 6 MMOL/L (ref 8–16)
AST SERPL-CCNC: 30 U/L (ref 10–40)
BILIRUB SERPL-MCNC: 1.2 MG/DL (ref 0.1–1)
BUN SERPL-MCNC: 16 MG/DL (ref 8–23)
CALCIUM SERPL-MCNC: 9.2 MG/DL (ref 8.7–10.5)
CHLORIDE SERPL-SCNC: 106 MMOL/L (ref 95–110)
CHOLEST SERPL-MCNC: 152 MG/DL (ref 120–199)
CHOLEST/HDLC SERPL: 2 {RATIO} (ref 2–5)
CO2 SERPL-SCNC: 28 MMOL/L (ref 23–29)
CREAT SERPL-MCNC: 1 MG/DL (ref 0.5–1.4)
EST. GFR  (AFRICAN AMERICAN): >60 ML/MIN/1.73 M^2
EST. GFR  (NON AFRICAN AMERICAN): >60 ML/MIN/1.73 M^2
GLUCOSE SERPL-MCNC: 86 MG/DL (ref 70–110)
HDLC SERPL-MCNC: 75 MG/DL (ref 40–75)
HDLC SERPL: 49.3 % (ref 20–50)
LDLC SERPL CALC-MCNC: 64.8 MG/DL (ref 63–159)
NONHDLC SERPL-MCNC: 77 MG/DL
POTASSIUM SERPL-SCNC: 3.6 MMOL/L (ref 3.5–5.1)
PROT SERPL-MCNC: 7 G/DL (ref 6–8.4)
SODIUM SERPL-SCNC: 140 MMOL/L (ref 136–145)
TRIGL SERPL-MCNC: 61 MG/DL (ref 30–150)
VIT B12 SERPL-MCNC: 419 PG/ML (ref 210–950)

## 2020-05-19 PROCEDURE — 36415 COLL VENOUS BLD VENIPUNCTURE: CPT

## 2020-05-19 PROCEDURE — 80053 COMPREHEN METABOLIC PANEL: CPT

## 2020-05-19 PROCEDURE — 80061 LIPID PANEL: CPT

## 2020-05-19 PROCEDURE — 82306 VITAMIN D 25 HYDROXY: CPT

## 2020-05-19 PROCEDURE — 76706 US ABDL AORTA SCREEN AAA: CPT | Mod: TC,PO

## 2020-05-19 PROCEDURE — 82607 VITAMIN B-12: CPT

## 2020-05-19 NOTE — TELEPHONE ENCOUNTER
----- Message from Beny Fay MD sent at 5/19/2020 12:46 PM CDT -----  The results are within acceptable range.  Please keep regular follow up.

## 2020-05-19 NOTE — TELEPHONE ENCOUNTER
----- Message from Beny Fay MD sent at 5/19/2020 12:45 PM CDT -----  Notify patient that his AAA screening ultrasound was negative for any aneurysm.  
Notified patient   
COUGH

## 2020-05-26 ENCOUNTER — OFFICE VISIT (OUTPATIENT)
Dept: FAMILY MEDICINE | Facility: CLINIC | Age: 85
End: 2020-05-26
Payer: MEDICARE

## 2020-05-26 VITALS
DIASTOLIC BLOOD PRESSURE: 73 MMHG | BODY MASS INDEX: 23.95 KG/M2 | WEIGHT: 158 LBS | TEMPERATURE: 99 F | SYSTOLIC BLOOD PRESSURE: 138 MMHG | HEART RATE: 72 BPM | HEIGHT: 68 IN

## 2020-05-26 DIAGNOSIS — E78.2 MIXED DYSLIPIDEMIA: ICD-10-CM

## 2020-05-26 DIAGNOSIS — F41.9 ANXIETY DISORDER, UNSPECIFIED TYPE: ICD-10-CM

## 2020-05-26 DIAGNOSIS — I10 ESSENTIAL HYPERTENSION: Primary | ICD-10-CM

## 2020-05-26 PROCEDURE — 1101F PR PT FALLS ASSESS DOC 0-1 FALLS W/OUT INJ PAST YR: ICD-10-PCS | Mod: S$GLB,,, | Performed by: INTERNAL MEDICINE

## 2020-05-26 PROCEDURE — 99213 PR OFFICE/OUTPT VISIT, EST, LEVL III, 20-29 MIN: ICD-10-PCS | Mod: S$GLB,,, | Performed by: INTERNAL MEDICINE

## 2020-05-26 PROCEDURE — 1159F PR MEDICATION LIST DOCUMENTED IN MEDICAL RECORD: ICD-10-PCS | Mod: S$GLB,,, | Performed by: INTERNAL MEDICINE

## 2020-05-26 PROCEDURE — 1159F MED LIST DOCD IN RCRD: CPT | Mod: S$GLB,,, | Performed by: INTERNAL MEDICINE

## 2020-05-26 PROCEDURE — 99213 OFFICE O/P EST LOW 20 MIN: CPT | Mod: S$GLB,,, | Performed by: INTERNAL MEDICINE

## 2020-05-26 PROCEDURE — 3078F DIAST BP <80 MM HG: CPT | Mod: S$GLB,,, | Performed by: INTERNAL MEDICINE

## 2020-05-26 PROCEDURE — 1101F PT FALLS ASSESS-DOCD LE1/YR: CPT | Mod: S$GLB,,, | Performed by: INTERNAL MEDICINE

## 2020-05-26 PROCEDURE — 1126F PR PAIN SEVERITY QUANTIFIED, NO PAIN PRESENT: ICD-10-PCS | Mod: S$GLB,,, | Performed by: INTERNAL MEDICINE

## 2020-05-26 PROCEDURE — 3075F PR MOST RECENT SYSTOLIC BLOOD PRESS GE 130-139MM HG: ICD-10-PCS | Mod: S$GLB,,, | Performed by: INTERNAL MEDICINE

## 2020-05-26 PROCEDURE — 3078F PR MOST RECENT DIASTOLIC BLOOD PRESSURE < 80 MM HG: ICD-10-PCS | Mod: S$GLB,,, | Performed by: INTERNAL MEDICINE

## 2020-05-26 PROCEDURE — 1126F AMNT PAIN NOTED NONE PRSNT: CPT | Mod: S$GLB,,, | Performed by: INTERNAL MEDICINE

## 2020-05-26 PROCEDURE — 3075F SYST BP GE 130 - 139MM HG: CPT | Mod: S$GLB,,, | Performed by: INTERNAL MEDICINE

## 2020-05-26 RX ORDER — LISINOPRIL 20 MG/1
20 TABLET ORAL 2 TIMES DAILY
Qty: 180 TABLET | Refills: 2 | Status: SHIPPED | OUTPATIENT
Start: 2020-05-26 | End: 2020-07-27 | Stop reason: SDUPTHER

## 2020-05-26 RX ORDER — ALPRAZOLAM 0.25 MG/1
TABLET ORAL
Qty: 21 TABLET | Refills: 0 | Status: SHIPPED | OUTPATIENT
Start: 2020-05-26 | End: 2020-08-05 | Stop reason: SDUPTHER

## 2020-05-26 NOTE — PATIENT INSTRUCTIONS
Anxiety Reaction  Anxiety is the feeling we all get when we think something bad might happen. It is a normal response to stress and usually causes only a mild reaction. When anxiety becomes more severe, it can interfere with daily life. In some cases, you may not even be aware of what it is youre anxious about. There may also be a genetic link or it may be a learned behavior in the home.  Both psychological and physical triggers cause stress reaction. It's often a response to fear or emotional stress, real or imagined. This stress may come from home, family, work, or social relationships.  During an anxiety reaction, you may feel:  · Helpless  · Nervous  · Depressed  · Irritable  Your body may show signs of anxiety in many ways. You may experience:  · Dry mouth  · Shakiness  · Dizziness  · Weakness  · Trouble breathing  · Breathing fast (hyperventilating)  · Chest pressure  · Sweating  · Headache  · Nausea  · Diarrhea  · Tiredness  · Inability to sleep  · Sexual problems  Home care  · Try to locate the sources of stress in your life. They may not be obvious. These may include:  ¨ Daily hassles of life (traffic jams, missed appointments, car troubles, etc.)  ¨ Major life changes, both good (new baby, job promotion) and bad (loss of job, loss of loved one)  ¨ Overload: feeling that you have too many responsibilities and can't take care of all of them at once  ¨ Feeling helpless, feeling that your problems are beyond what youre able to solve  · Notice how your body reacts to stress. Learn to listen to your body signals. This will help you take action before the stress becomes severe.  · When you can, do something about the source of your stress. (Avoid hassles, limit the amount of change that happens in your life at one time and take a break when you feel overloaded).  · Unfortunately, many stressful situations can't be avoided. It is necessary to learn how to better manage stress. There are many proven methods  that will reduce your anxiety. These include simple things like exercise, good nutrition and adequate rest. Also, there are certain techniques that are helpful:  ¨ Relaxation  ¨ Breathing exercises  ¨ Visualization  ¨ Biofeedback  ¨ Meditation  For more information about this, consult your doctor or go to a local bookstore and review the many books and tapes available on this subject.  Follow-up care  If you feel that your anxiety is not responding to self-help measures, contact your doctor or make an appointment with a counselor. You may need short-term psychological counseling and temporary medicine to help you manage stress.  Call 911  Call your healthcare provider right away if any of these occur:  · Trouble breathing  · Confusion  · Drowsiness or trouble wakening  · Fainting or loss of consciousness  · Rapid heart rate  · Seizure  · New chest pain that becomes more severe, lasts longer, or spreads into your shoulder, arm, neck, jaw, or back  When to seek medical advice  Call your healthcare provider right away if any of these occur:  · Your symptoms get worse  · Severe headache not relieved by rest and mild pain reliever  Date Last Reviewed: 9/29/2015  © 9367-7614 [employer]. [address] [place] [id number]. All rights reserved. This information is not intended as a substitute for professional medical care. Always follow your healthcare professional's instructions.

## 2020-05-26 NOTE — PROGRESS NOTES
Subjective:       Patient ID: Bernardo Helm is a 84 y.o. male.    Chief Complaint: Hypertension (lab review ); Anxiety; Hyperlipidemia; and Gastroesophageal Reflux    Mr. helm is 84-year-old gentleman who comes for follow-up.  Underlying medical issues of hypertension, dyslipidemia, gastroesophageal reflux and intermittent episodes of anxiety have been noted.    Blood pressure medications have been reviewed and there seems to be therapeutic duplication with lisinopril and valsartan.    He takes Xanax 0.25 mg as needed for anxiety on those occasions when he is upset or cannot sleep.    Hypertension   This is a chronic problem. The current episode started more than 1 year ago. The problem is controlled. Associated symptoms include anxiety and malaise/fatigue. Pertinent negatives include no chest pain or headaches. Risk factors for coronary artery disease include male gender. Past treatments include beta blockers and ACE inhibitors.   Anxiety   Presents for follow-up visit. Symptoms include compulsions, decreased concentration, excessive worry, insomnia, irritability, nervous/anxious behavior and obsessions. Patient reports no chest pain or dizziness.       Hyperlipidemia   This is a chronic problem. The current episode started more than 1 year ago. The problem is controlled. He has no history of hypothyroidism or obesity. Pertinent negatives include no chest pain. Current antihyperlipidemic treatment includes statins. The current treatment provides moderate improvement of lipids. Risk factors for coronary artery disease include a sedentary lifestyle, hypertension, male sex and dyslipidemia.   Gastroesophageal Reflux   He complains of heartburn. He reports no abdominal pain, no chest pain or no choking. This is a recurrent problem. The current episode started more than 1 year ago. The problem has been gradually worsening. He has tried a PPI for the symptoms. The treatment provided mild relief.       Past Medical  History:   Diagnosis Date    Anxiety     Asbestos-induced pleural plaque 2020    Depression     GERD (gastroesophageal reflux disease)     Hyperlipidemia     Hypertension     Vocal cord cancer      Social History     Socioeconomic History    Marital status:      Spouse name: Yana Oliveira    Number of children: Not on file    Years of education: Not on file    Highest education level: Not on file   Occupational History    Occupation: Retired -  on [employer]   Social Needs    Financial resource strain: Not on file    Food insecurity:     Worry: Not on file     Inability: Not on file    Transportation needs:     Medical: Not on file     Non-medical: Not on file   Tobacco Use    Smoking status: Former Smoker     Packs/day: 1.50     Types: Cigarettes     Last attempt to quit: 1982     Years since quittin.0    Smokeless tobacco: Never Used   Substance and Sexual Activity    Alcohol use: Not Currently    Drug use: No    Sexual activity: Not Currently   Lifestyle    Physical activity:     Days per week: Not on file     Minutes per session: Not on file    Stress: Not at all   Relationships    Social connections:     Talks on phone: Not on file     Gets together: Not on file     Attends Bahai service: Not on file     Active member of club or organization: Not on file     Attends meetings of clubs or organizations: Not on file     Relationship status: Not on file   Other Topics Concern    Not on file   Social History Narrative    Not on file     Past Surgical History:   Procedure Laterality Date    CATARACT EXTRACTION, BILATERAL      CHOLECYSTECTOMY      CIRCUMCISION      INGUINAL HERNIA REPAIR Right 2000    INGUINAL HERNIA REPAIR Left     RETINAL DETACHMENT SURGERY      THROAT SURGERY      X 2 for vocal cord cancer    TONSILLECTOMY      TRANSURETHRAL RESECTION OF PROSTATE  2004    dr cindi ryan - [facility]     History reviewed. No pertinent family  "history.    Review of Systems   Constitutional: Positive for irritability and malaise/fatigue. Negative for activity change, diaphoresis, fever and unexpected weight change.   HENT: Negative for congestion and drooling.    Eyes: Negative for discharge and redness.   Respiratory: Negative for apnea, choking and chest tightness.    Cardiovascular: Negative for chest pain.   Gastrointestinal: Positive for heartburn. Negative for abdominal distention, abdominal pain and anal bleeding.   Neurological: Negative for dizziness, seizures and headaches.   Psychiatric/Behavioral: Positive for decreased concentration. Negative for agitation and behavioral problems. The patient is nervous/anxious and has insomnia.          Objective:      Blood pressure 138/73, pulse 72, temperature 98.8 °F (37.1 °C), height 5' 8" (1.727 m), weight 71.7 kg (158 lb). Body mass index is 24.02 kg/m².  Physical Exam   Constitutional: He appears well-developed and well-nourished. No distress.   HENT:   Head: Normocephalic and atraumatic.   Right Ear: Decreased hearing is noted.   Left Ear: Decreased hearing is noted.   Nose: Nose normal.   Mouth/Throat: No oropharyngeal exudate.   Hearing aid   Eyes: Conjunctivae and EOM are normal.   Neck: Neck supple. No JVD present. No tracheal deviation present. No thyromegaly present.   Cardiovascular: Normal rate, regular rhythm and normal heart sounds.   Pulmonary/Chest: Effort normal and breath sounds normal. No respiratory distress. He has no wheezes. He has no rales.   Abdominal: Soft. Bowel sounds are normal. He exhibits no distension. There is no tenderness.   Neurological: He is alert. He displays tremor.   Mild tremors- pt denies   Skin: Skin is warm and dry.   Senile freckles.    Psychiatric: His mood appears anxious. His speech is not rapid and/or pressured and not delayed.   Nursing note and vitals reviewed.        Assessment:       1. Essential hypertension    2. Mixed dyslipidemia    3. Anxiety " disorder, unspecified type           Lab Visit on 05/19/2020   Component Date Value Ref Range Status    Cholesterol 05/19/2020 152  120 - 199 mg/dL Final    Triglycerides 05/19/2020 61  30 - 150 mg/dL Final    HDL 05/19/2020 75  40 - 75 mg/dL Final    LDL Cholesterol 05/19/2020 64.8  63.0 - 159.0 mg/dL Final    Hdl/Cholesterol Ratio 05/19/2020 49.3  20.0 - 50.0 % Final    Total Cholesterol/HDL Ratio 05/19/2020 2.0  2.0 - 5.0 Final    Non-HDL Cholesterol 05/19/2020 77  mg/dL Final    Sodium 05/19/2020 140  136 - 145 mmol/L Final    Potassium 05/19/2020 3.6  3.5 - 5.1 mmol/L Final    Chloride 05/19/2020 106  95 - 110 mmol/L Final    CO2 05/19/2020 28  23 - 29 mmol/L Final    Glucose 05/19/2020 86  70 - 110 mg/dL Final    BUN, Bld 05/19/2020 16  8 - 23 mg/dL Final    Creatinine 05/19/2020 1.0  0.5 - 1.4 mg/dL Final    Calcium 05/19/2020 9.2  8.7 - 10.5 mg/dL Final    Total Protein 05/19/2020 7.0  6.0 - 8.4 g/dL Final    Albumin 05/19/2020 3.9  3.5 - 5.2 g/dL Final    Total Bilirubin 05/19/2020 1.2* 0.1 - 1.0 mg/dL Final    Alkaline Phosphatase 05/19/2020 68  55 - 135 U/L Final    AST 05/19/2020 30  10 - 40 U/L Final    ALT 05/19/2020 55* 10 - 44 U/L Final    Anion Gap 05/19/2020 6* 8 - 16 mmol/L Final    eGFR if African American 05/19/2020 >60.0  >60 mL/min/1.73 m^2 Final    eGFR if non African American 05/19/2020 >60.0  >60 mL/min/1.73 m^2 Final    Vit D, 25-Hydroxy 05/19/2020 29* 30 - 96 ng/mL Final    Vitamin B-12 05/19/2020 419  210 - 950 pg/mL Final         Plan:           Essential hypertension  -     lisinopriL (PRINIVIL,ZESTRIL) 20 MG tablet; Take 1 tablet (20 mg total) by mouth 2 (two) times daily.  Dispense: 180 tablet; Refill: 2    Mixed dyslipidemia    Anxiety disorder, unspecified type  -     ALPRAZolam (XANAX) 0.25 MG tablet; TAKE 1 TABLET BY MOUTH ONCE DAILY AS NEEDED FOR ANXIETY- Rx for 90 days  Dispense: 21 tablet; Refill: 0      Patient's blood pressures has been reviewed.   He is on a therapeutic class duplication with valsartan as well as lisinopril.  My documentation shows lisinopril 10 mg once a day or twice a day.  His home paper shows 40 mg twice a day.  Patient's wife states that he is taking 20 mg twice a day.  I have asked her to confirm the dosage and I have sent a new prescription.    I will stop the valsartan officially since there is a therapeutic duplication.  Wall certain was prescribed by Dr. Johnson.  Probably he was not aware that patient is already on lisinopril.    Patient's anxiety continues intermittently.  He gets upset if he cannot fix a thing.  He takes alprazolam 0.25 mg to help him rest on those occasions.  Please minimize use of this medication.    He continues with atorvastatin 40 mg at bedtime and pantoprazole for reflux symptoms.  His reflux symptoms continue to bother him.  I have advised him to stop coffee for 7-10 days and see how he does.  If it does not get better, I will consider further alternatives.      Current Outpatient Medications:     ALPRAZolam (XANAX) 0.25 MG tablet, TAKE 1 TABLET BY MOUTH ONCE DAILY AS NEEDED FOR ANXIETY- Rx for 90 days, Disp: 21 tablet, Rfl: 0    atorvastatin (LIPITOR) 40 MG tablet, Take 1 tablet (40 mg total) by mouth once daily., Disp: 90 tablet, Rfl: 1    lisinopriL (PRINIVIL,ZESTRIL) 20 MG tablet, Take 1 tablet (20 mg total) by mouth 2 (two) times daily., Disp: 180 tablet, Rfl: 2    metoprolol succinate (TOPROL-XL) 25 MG 24 hr tablet, TAKE 1 TABLET EVERY DAY, Disp: 90 tablet, Rfl: 1    pantoprazole (PROTONIX) 40 MG tablet, Take 40 mg by mouth every morning., Disp: , Rfl: 3

## 2020-06-09 RX ORDER — PANTOPRAZOLE SODIUM 40 MG/1
40 TABLET, DELAYED RELEASE ORAL EVERY MORNING
Qty: 90 TABLET | Refills: 1 | Status: SHIPPED | OUTPATIENT
Start: 2020-06-09 | End: 2020-11-30

## 2020-06-11 DIAGNOSIS — E78.2 MIXED DYSLIPIDEMIA: ICD-10-CM

## 2020-06-11 RX ORDER — ATORVASTATIN CALCIUM 40 MG/1
40 TABLET, FILM COATED ORAL DAILY
Qty: 90 TABLET | Refills: 1 | Status: SHIPPED | OUTPATIENT
Start: 2020-06-11 | End: 2020-07-16 | Stop reason: SDUPTHER

## 2020-07-16 DIAGNOSIS — E78.2 MIXED DYSLIPIDEMIA: ICD-10-CM

## 2020-07-16 RX ORDER — ATORVASTATIN CALCIUM 40 MG/1
40 TABLET, FILM COATED ORAL DAILY
Qty: 90 TABLET | Refills: 1 | Status: SHIPPED | OUTPATIENT
Start: 2020-07-16 | End: 2020-11-16 | Stop reason: SDUPTHER

## 2020-07-27 DIAGNOSIS — I10 ESSENTIAL HYPERTENSION: ICD-10-CM

## 2020-07-27 RX ORDER — LISINOPRIL 20 MG/1
20 TABLET ORAL 2 TIMES DAILY
Qty: 180 TABLET | Refills: 2 | Status: SHIPPED | OUTPATIENT
Start: 2020-07-27 | End: 2020-09-01

## 2020-08-03 ENCOUNTER — TELEPHONE (OUTPATIENT)
Dept: UROLOGY | Facility: CLINIC | Age: 85
End: 2020-08-03

## 2020-08-03 NOTE — TELEPHONE ENCOUNTER
----- Message from Lenin Wild sent at 8/3/2020  3:21 PM CDT -----  Type:  Sooner Apoointment Request    Caller is requesting a sooner appointment.  Caller declined first available appointment listed below.  Caller will not accept being placed on the waitlist and is requesting a message be sent to doctor.    Name of Caller:  Yana (Spouse)  When is the first available appointment?  9/21  Symptoms:  annual/follow up (was due in march 2020)  Best Call Back Number:  [phone]  Additional Information:  NA

## 2020-08-10 DIAGNOSIS — F41.9 ANXIETY DISORDER, UNSPECIFIED TYPE: ICD-10-CM

## 2020-08-10 RX ORDER — ALPRAZOLAM 0.25 MG/1
TABLET ORAL
Qty: 6 TABLET | Refills: 0 | Status: SHIPPED | OUTPATIENT
Start: 2020-08-10 | End: 2020-09-01 | Stop reason: SDUPTHER

## 2020-08-10 NOTE — TELEPHONE ENCOUNTER
karyna is having trouble filling  Alprazolam  they are questioning  6 pills only and its controlled   Maybe senting  to local would be better ??

## 2020-09-01 ENCOUNTER — OFFICE VISIT (OUTPATIENT)
Dept: FAMILY MEDICINE | Facility: CLINIC | Age: 85
End: 2020-09-01
Payer: MEDICARE

## 2020-09-01 DIAGNOSIS — I10 ESSENTIAL HYPERTENSION: Primary | ICD-10-CM

## 2020-09-01 DIAGNOSIS — F41.9 ANXIETY DISORDER, UNSPECIFIED TYPE: ICD-10-CM

## 2020-09-01 DIAGNOSIS — E78.2 MIXED DYSLIPIDEMIA: ICD-10-CM

## 2020-09-01 DIAGNOSIS — Z23 NEEDS FLU SHOT: ICD-10-CM

## 2020-09-01 PROCEDURE — 3079F PR MOST RECENT DIASTOLIC BLOOD PRESSURE 80-89 MM HG: ICD-10-PCS | Mod: S$GLB,,, | Performed by: INTERNAL MEDICINE

## 2020-09-01 PROCEDURE — 90662 FLU VACCINE - QUADRIVALENT - HIGH DOSE (65+) PRESERVATIVE FREE IM: ICD-10-PCS | Mod: S$GLB,,, | Performed by: INTERNAL MEDICINE

## 2020-09-01 PROCEDURE — 1159F PR MEDICATION LIST DOCUMENTED IN MEDICAL RECORD: ICD-10-PCS | Mod: S$GLB,,, | Performed by: INTERNAL MEDICINE

## 2020-09-01 PROCEDURE — 3079F DIAST BP 80-89 MM HG: CPT | Mod: S$GLB,,, | Performed by: INTERNAL MEDICINE

## 2020-09-01 PROCEDURE — G0008 FLU VACCINE - QUADRIVALENT - HIGH DOSE (65+) PRESERVATIVE FREE IM: ICD-10-PCS | Mod: S$GLB,,, | Performed by: INTERNAL MEDICINE

## 2020-09-01 PROCEDURE — 99213 PR OFFICE/OUTPT VISIT, EST, LEVL III, 20-29 MIN: ICD-10-PCS | Mod: 25,S$GLB,, | Performed by: INTERNAL MEDICINE

## 2020-09-01 PROCEDURE — 99213 OFFICE O/P EST LOW 20 MIN: CPT | Mod: 25,S$GLB,, | Performed by: INTERNAL MEDICINE

## 2020-09-01 PROCEDURE — 1101F PR PT FALLS ASSESS DOC 0-1 FALLS W/OUT INJ PAST YR: ICD-10-PCS | Mod: S$GLB,,, | Performed by: INTERNAL MEDICINE

## 2020-09-01 PROCEDURE — 3075F PR MOST RECENT SYSTOLIC BLOOD PRESS GE 130-139MM HG: ICD-10-PCS | Mod: S$GLB,,, | Performed by: INTERNAL MEDICINE

## 2020-09-01 PROCEDURE — 1125F AMNT PAIN NOTED PAIN PRSNT: CPT | Mod: S$GLB,,, | Performed by: INTERNAL MEDICINE

## 2020-09-01 PROCEDURE — 90662 IIV NO PRSV INCREASED AG IM: CPT | Mod: S$GLB,,, | Performed by: INTERNAL MEDICINE

## 2020-09-01 PROCEDURE — G0008 ADMIN INFLUENZA VIRUS VAC: HCPCS | Mod: S$GLB,,, | Performed by: INTERNAL MEDICINE

## 2020-09-01 PROCEDURE — 1101F PT FALLS ASSESS-DOCD LE1/YR: CPT | Mod: S$GLB,,, | Performed by: INTERNAL MEDICINE

## 2020-09-01 PROCEDURE — 1125F PR PAIN SEVERITY QUANTIFIED, PAIN PRESENT: ICD-10-PCS | Mod: S$GLB,,, | Performed by: INTERNAL MEDICINE

## 2020-09-01 PROCEDURE — 1159F MED LIST DOCD IN RCRD: CPT | Mod: S$GLB,,, | Performed by: INTERNAL MEDICINE

## 2020-09-01 PROCEDURE — 3075F SYST BP GE 130 - 139MM HG: CPT | Mod: S$GLB,,, | Performed by: INTERNAL MEDICINE

## 2020-09-01 RX ORDER — ALPRAZOLAM 0.25 MG/1
TABLET ORAL
Qty: 22 TABLET | Refills: 1 | Status: SHIPPED | OUTPATIENT
Start: 2020-09-01 | End: 2021-03-08 | Stop reason: SDUPTHER

## 2020-09-01 RX ORDER — VALSARTAN 160 MG/1
160 TABLET ORAL DAILY
COMMUNITY
Start: 2020-08-09 | End: 2020-11-16

## 2020-09-01 NOTE — PROGRESS NOTES
Subjective:       Patient ID: Bernardo Oliveira is a 85 y.o. male.    Chief Complaint: Hypertension, Hip Pain, Insomnia, and Hyperlipidemia    Hypertension  This is a chronic problem. The current episode started more than 1 year ago. The problem has been waxing and waning since onset. The problem is uncontrolled. Associated symptoms include malaise/fatigue. Pertinent negatives include no chest pain or headaches. Risk factors for coronary artery disease include sedentary lifestyle and male gender. Past treatments include angiotensin blockers and beta blockers. The current treatment provides mild improvement. Compliance problems include psychosocial issues.    Hip Pain   There was no injury mechanism. The pain is present in the left hip. The quality of the pain is described as cramping and shooting. The pain is moderate. The pain has been intermittent since onset. He reports no foreign bodies present.       Past Medical History:   Diagnosis Date    Anxiety     Asbestos-induced pleural plaque 2020    Depression     GERD (gastroesophageal reflux disease)     Hyperlipidemia     Hypertension     Vocal cord cancer      Social History     Socioeconomic History    Marital status:      Spouse name: Yana Oliveira    Number of children: Not on file    Years of education: Not on file    Highest education level: Not on file   Occupational History    Occupation: Retired -  on [employer]   Social Needs    Financial resource strain: Not on file    Food insecurity     Worry: Not on file     Inability: Not on file    Transportation needs     Medical: Not on file     Non-medical: Not on file   Tobacco Use    Smoking status: Former Smoker     Packs/day: 1.50     Types: Cigarettes     Quit date: 1982     Years since quittin.3    Smokeless tobacco: Never Used   Substance and Sexual Activity    Alcohol use: Not Currently    Drug use: No    Sexual activity: Not Currently   Lifestyle     "Physical activity     Days per week: Not on file     Minutes per session: Not on file    Stress: Not at all   Relationships    Social connections     Talks on phone: Not on file     Gets together: Not on file     Attends Amish service: Not on file     Active member of club or organization: Not on file     Attends meetings of clubs or organizations: Not on file     Relationship status: Not on file   Other Topics Concern    Not on file   Social History Narrative    Not on file     Past Surgical History:   Procedure Laterality Date    CATARACT EXTRACTION, BILATERAL      CHOLECYSTECTOMY      CIRCUMCISION      INGUINAL HERNIA REPAIR Right 2000    INGUINAL HERNIA REPAIR Left 1995    RETINAL DETACHMENT SURGERY      THROAT SURGERY      X 2 for vocal cord cancer    TONSILLECTOMY      TRANSURETHRAL RESECTION OF PROSTATE  2004    dr cindi ryan - [facility]     History reviewed. No pertinent family history.    Review of Systems   Constitutional: Positive for malaise/fatigue. Negative for activity change, diaphoresis, fever and unexpected weight change.   HENT: Negative for congestion and drooling.    Eyes: Negative for discharge and redness.   Respiratory: Negative for apnea, choking and chest tightness.    Cardiovascular: Negative for chest pain.   Gastrointestinal: Negative for abdominal distention, abdominal pain and anal bleeding.   Neurological: Negative for dizziness, seizures and headaches.   Psychiatric/Behavioral: Positive for decreased concentration. Negative for agitation and behavioral problems. The patient is nervous/anxious.          Objective:      Blood pressure 139/88, pulse 72, temperature 98.6 °F (37 °C), height 5' 8" (1.727 m), weight 73 kg (161 lb). Body mass index is 24.48 kg/m².  Physical Exam  Vitals signs and nursing note reviewed.   Constitutional:       General: He is not in acute distress.     Appearance: He is well-developed. He is not ill-appearing or diaphoretic.      Comments: BMI " 24.48   HENT:      Head: Normocephalic and atraumatic.      Right Ear: Decreased hearing noted.      Left Ear: Decreased hearing noted.      Mouth/Throat:      Pharynx: No oropharyngeal exudate.   Eyes:      Conjunctiva/sclera: Conjunctivae normal.   Neck:      Musculoskeletal: Neck supple.      Thyroid: No thyromegaly.      Vascular: No JVD.      Trachea: No tracheal deviation.   Cardiovascular:      Rate and Rhythm: Normal rate and regular rhythm.      Heart sounds: Normal heart sounds.   Pulmonary:      Effort: Pulmonary effort is normal. No respiratory distress.      Breath sounds: Normal breath sounds. No wheezing or rales.   Abdominal:      General: Bowel sounds are normal. There is no distension.      Palpations: Abdomen is soft.      Tenderness: There is no abdominal tenderness.   Skin:     General: Skin is warm and dry.      Comments: Senile freckles.    Neurological:      Mental Status: He is alert. Mental status is at baseline.      Motor: Tremor present.      Comments: Mild tremors- pt denies   Psychiatric:         Mood and Affect: Mood is anxious.         Speech: Speech is not rapid and pressured or delayed.         Behavior: Behavior normal.           Assessment:       1. Essential hypertension    2. Mixed dyslipidemia    3. Anxiety disorder, unspecified type    4. Needs flu shot           No visits with results within 3 Month(s) from this visit.   Latest known visit with results is:   Lab Visit on 05/19/2020   Component Date Value Ref Range Status    Cholesterol 05/19/2020 152  120 - 199 mg/dL Final    Triglycerides 05/19/2020 61  30 - 150 mg/dL Final    HDL 05/19/2020 75  40 - 75 mg/dL Final    LDL Cholesterol 05/19/2020 64.8  63.0 - 159.0 mg/dL Final    Hdl/Cholesterol Ratio 05/19/2020 49.3  20.0 - 50.0 % Final    Total Cholesterol/HDL Ratio 05/19/2020 2.0  2.0 - 5.0 Final    Non-HDL Cholesterol 05/19/2020 77  mg/dL Final    Sodium 05/19/2020 140  136 - 145 mmol/L Final    Potassium  05/19/2020 3.6  3.5 - 5.1 mmol/L Final    Chloride 05/19/2020 106  95 - 110 mmol/L Final    CO2 05/19/2020 28  23 - 29 mmol/L Final    Glucose 05/19/2020 86  70 - 110 mg/dL Final    BUN, Bld 05/19/2020 16  8 - 23 mg/dL Final    Creatinine 05/19/2020 1.0  0.5 - 1.4 mg/dL Final    Calcium 05/19/2020 9.2  8.7 - 10.5 mg/dL Final    Total Protein 05/19/2020 7.0  6.0 - 8.4 g/dL Final    Albumin 05/19/2020 3.9  3.5 - 5.2 g/dL Final    Total Bilirubin 05/19/2020 1.2* 0.1 - 1.0 mg/dL Final    Alkaline Phosphatase 05/19/2020 68  55 - 135 U/L Final    AST 05/19/2020 30  10 - 40 U/L Final    ALT 05/19/2020 55* 10 - 44 U/L Final    Anion Gap 05/19/2020 6* 8 - 16 mmol/L Final    eGFR if African American 05/19/2020 >60.0  >60 mL/min/1.73 m^2 Final    eGFR if non African American 05/19/2020 >60.0  >60 mL/min/1.73 m^2 Final    Vit D, 25-Hydroxy 05/19/2020 29* 30 - 96 ng/mL Final    Vitamin B-12 05/19/2020 419  210 - 950 pg/mL Final         Plan:           Essential hypertension    Mixed dyslipidemia    Anxiety disorder, unspecified type  -     ALPRAZolam (XANAX) 0.25 MG tablet; Take 1 pill rarely as needed for anxiety and elevated blood pressure.  Not to exceed more than 2 pills in a week.  Dispense: 22 tablet; Refill: 1    Needs flu shot  -     Influenza - Quadrivalent - High Dose (65+) (PF) (IM)    Patient has been advised to watch diet and exercise. Avoid fried and fatty food. Compliance to medications and follow up urged.    Advised Mr. Oliveira about age and season appropriate immunizations/ cancer screenings.  Also seasonal influenza vaccine, update on tetanus diphtheria vaccination every 10 years.    Fup 3 months    Please utilize precautions for current COVID-19 pandemic.  Try to avoid crowds or close contact with multiple people.  Minimize outside interaction.  Wash hands with soap for  frequently upon contact.Use face mask or cover.          Current Outpatient Medications:     ALPRAZolam (XANAX)  0.25 MG tablet, Take 1 pill rarely as needed for anxiety and elevated blood pressure.  Not to exceed more than 2 pills in a week., Disp: 22 tablet, Rfl: 1    atorvastatin (LIPITOR) 40 MG tablet, Take 1 tablet (40 mg total) by mouth once daily., Disp: 90 tablet, Rfl: 1    metoprolol succinate (TOPROL-XL) 25 MG 24 hr tablet, TAKE 1 TABLET EVERY DAY, Disp: 90 tablet, Rfl: 1    pantoprazole (PROTONIX) 40 MG tablet, Take 1 tablet (40 mg total) by mouth every morning., Disp: 90 tablet, Rfl: 1    valsartan (DIOVAN) 160 MG tablet, Take 160 mg by mouth once daily., Disp: , Rfl:

## 2020-09-01 NOTE — PATIENT INSTRUCTIONS
Anxiety Reaction  Anxiety is the feeling we all get when we think something bad might happen. It is a normal response to stress and usually causes only a mild reaction. When anxiety becomes more severe, it can interfere with daily life. In some cases, you may not even be aware of what it is youre anxious about. There may also be a genetic link or it may be a learned behavior in the home.  Both psychological and physical triggers cause stress reaction. It's often a response to fear or emotional stress, real or imagined. This stress may come from home, family, work, or social relationships.  During an anxiety reaction, you may feel:  · Helpless  · Nervous  · Depressed  · Irritable  Your body may show signs of anxiety in many ways. You may experience:  · Dry mouth  · Shakiness  · Dizziness  · Weakness  · Trouble breathing  · Breathing fast (hyperventilating)  · Chest pressure  · Sweating  · Headache  · Nausea  · Diarrhea  · Tiredness  · Inability to sleep  · Sexual problems  Home care  · Try to locate the sources of stress in your life. They may not be obvious. These may include:  ¨ Daily hassles of life (traffic jams, missed appointments, car troubles, etc.)  ¨ Major life changes, both good (new baby, job promotion) and bad (loss of job, loss of loved one)  ¨ Overload: feeling that you have too many responsibilities and can't take care of all of them at once  ¨ Feeling helpless, feeling that your problems are beyond what youre able to solve  · Notice how your body reacts to stress. Learn to listen to your body signals. This will help you take action before the stress becomes severe.  · When you can, do something about the source of your stress. (Avoid hassles, limit the amount of change that happens in your life at one time and take a break when you feel overloaded).  · Unfortunately, many stressful situations can't be avoided. It is necessary to learn how to better manage stress. There are many proven methods  that will reduce your anxiety. These include simple things like exercise, good nutrition and adequate rest. Also, there are certain techniques that are helpful:  ¨ Relaxation  ¨ Breathing exercises  ¨ Visualization  ¨ Biofeedback  ¨ Meditation  For more information about this, consult your doctor or go to a local bookstore and review the many books and tapes available on this subject.  Follow-up care  If you feel that your anxiety is not responding to self-help measures, contact your doctor or make an appointment with a counselor. You may need short-term psychological counseling and temporary medicine to help you manage stress.  Call 911  Call your healthcare provider right away if any of these occur:  · Trouble breathing  · Confusion  · Drowsiness or trouble wakening  · Fainting or loss of consciousness  · Rapid heart rate  · Seizure  · New chest pain that becomes more severe, lasts longer, or spreads into your shoulder, arm, neck, jaw, or back  When to seek medical advice  Call your healthcare provider right away if any of these occur:  · Your symptoms get worse  · Severe headache not relieved by rest and mild pain reliever  Date Last Reviewed: 9/29/2015  © 5201-4563 [employer]. [address] [place] [address]. All rights reserved. This information is not intended as a substitute for professional medical care. Always follow your healthcare professional's instructions.

## 2020-09-02 VITALS
TEMPERATURE: 99 F | HEART RATE: 72 BPM | WEIGHT: 161 LBS | HEIGHT: 68 IN | DIASTOLIC BLOOD PRESSURE: 88 MMHG | BODY MASS INDEX: 24.4 KG/M2 | SYSTOLIC BLOOD PRESSURE: 139 MMHG

## 2020-09-16 ENCOUNTER — OFFICE VISIT (OUTPATIENT)
Dept: UROLOGY | Facility: CLINIC | Age: 85
End: 2020-09-16
Payer: MEDICARE

## 2020-09-16 ENCOUNTER — LAB VISIT (OUTPATIENT)
Dept: LAB | Facility: HOSPITAL | Age: 85
End: 2020-09-16
Attending: UROLOGY
Payer: MEDICARE

## 2020-09-16 VITALS
DIASTOLIC BLOOD PRESSURE: 86 MMHG | HEART RATE: 71 BPM | BODY MASS INDEX: 24.79 KG/M2 | HEIGHT: 68 IN | SYSTOLIC BLOOD PRESSURE: 182 MMHG | WEIGHT: 163.56 LBS

## 2020-09-16 DIAGNOSIS — N40.1 BENIGN PROSTATIC HYPERPLASIA WITH WEAK URINARY STREAM: ICD-10-CM

## 2020-09-16 DIAGNOSIS — R35.1 NOCTURIA: ICD-10-CM

## 2020-09-16 DIAGNOSIS — R39.12 BENIGN PROSTATIC HYPERPLASIA WITH WEAK URINARY STREAM: ICD-10-CM

## 2020-09-16 DIAGNOSIS — Z12.5 SCREENING FOR PROSTATE CANCER: ICD-10-CM

## 2020-09-16 DIAGNOSIS — Z12.5 SCREENING FOR PROSTATE CANCER: Primary | ICD-10-CM

## 2020-09-16 LAB
BILIRUB SERPL-MCNC: NORMAL MG/DL
BLOOD URINE, POC: NORMAL
CLARITY, POC UA: CLEAR
COLOR, POC UA: YELLOW
GLUCOSE UR QL STRIP: NORMAL
KETONES UR QL STRIP: NORMAL
LEUKOCYTE ESTERASE URINE, POC: NORMAL
NITRITE, POC UA: NORMAL
PH, POC UA: 7
PROTEIN, POC: NORMAL
SPECIFIC GRAVITY, POC UA: 1.01
UROBILINOGEN, POC UA: NORMAL

## 2020-09-16 PROCEDURE — 3077F SYST BP >= 140 MM HG: CPT | Mod: CPTII,S$GLB,, | Performed by: UROLOGY

## 2020-09-16 PROCEDURE — 81002 POCT URINE DIPSTICK WITHOUT MICROSCOPE: ICD-10-PCS | Mod: S$GLB,,, | Performed by: UROLOGY

## 2020-09-16 PROCEDURE — 36415 COLL VENOUS BLD VENIPUNCTURE: CPT | Mod: PO

## 2020-09-16 PROCEDURE — 84153 ASSAY OF PSA TOTAL: CPT

## 2020-09-16 PROCEDURE — 81002 URINALYSIS NONAUTO W/O SCOPE: CPT | Mod: S$GLB,,, | Performed by: UROLOGY

## 2020-09-16 PROCEDURE — 3077F PR MOST RECENT SYSTOLIC BLOOD PRESSURE >= 140 MM HG: ICD-10-PCS | Mod: CPTII,S$GLB,, | Performed by: UROLOGY

## 2020-09-16 PROCEDURE — 1159F MED LIST DOCD IN RCRD: CPT | Mod: S$GLB,,, | Performed by: UROLOGY

## 2020-09-16 PROCEDURE — 99999 PR PBB SHADOW E&M-EST. PATIENT-LVL III: ICD-10-PCS | Mod: PBBFAC,,, | Performed by: UROLOGY

## 2020-09-16 PROCEDURE — 1159F PR MEDICATION LIST DOCUMENTED IN MEDICAL RECORD: ICD-10-PCS | Mod: S$GLB,,, | Performed by: UROLOGY

## 2020-09-16 PROCEDURE — 1101F PR PT FALLS ASSESS DOC 0-1 FALLS W/OUT INJ PAST YR: ICD-10-PCS | Mod: CPTII,S$GLB,, | Performed by: UROLOGY

## 2020-09-16 PROCEDURE — 99999 PR PBB SHADOW E&M-EST. PATIENT-LVL III: CPT | Mod: PBBFAC,,, | Performed by: UROLOGY

## 2020-09-16 PROCEDURE — 1126F PR PAIN SEVERITY QUANTIFIED, NO PAIN PRESENT: ICD-10-PCS | Mod: S$GLB,,, | Performed by: UROLOGY

## 2020-09-16 PROCEDURE — 3079F DIAST BP 80-89 MM HG: CPT | Mod: CPTII,S$GLB,, | Performed by: UROLOGY

## 2020-09-16 PROCEDURE — 3079F PR MOST RECENT DIASTOLIC BLOOD PRESSURE 80-89 MM HG: ICD-10-PCS | Mod: CPTII,S$GLB,, | Performed by: UROLOGY

## 2020-09-16 PROCEDURE — 1126F AMNT PAIN NOTED NONE PRSNT: CPT | Mod: S$GLB,,, | Performed by: UROLOGY

## 2020-09-16 PROCEDURE — 99214 PR OFFICE/OUTPT VISIT, EST, LEVL IV, 30-39 MIN: ICD-10-PCS | Mod: 25,S$GLB,, | Performed by: UROLOGY

## 2020-09-16 PROCEDURE — 1101F PT FALLS ASSESS-DOCD LE1/YR: CPT | Mod: CPTII,S$GLB,, | Performed by: UROLOGY

## 2020-09-16 PROCEDURE — 99214 OFFICE O/P EST MOD 30 MIN: CPT | Mod: 25,S$GLB,, | Performed by: UROLOGY

## 2020-09-17 LAB — COMPLEXED PSA SERPL-MCNC: 1.1 NG/ML (ref 0–4)

## 2020-09-29 ENCOUNTER — TELEPHONE (OUTPATIENT)
Dept: UROLOGY | Facility: CLINIC | Age: 85
End: 2020-09-29

## 2020-09-29 NOTE — TELEPHONE ENCOUNTER
----- Message from Esme Prakash sent at 9/29/2020 12:04 PM CDT -----  Regarding: PSa results  Type:  Test Results    Who Called: Yana(wife     Name of Test (Lab/Mammo/Etc): PSA     Date of Test: 09/16    Ordering Provider: Dr. Mc    Where the test was performed: [facility] [place] location     Would the patient rather a call back or a response via [facility]? Call back     Best Call Back Number: [phone]

## 2020-10-05 ENCOUNTER — TELEPHONE (OUTPATIENT)
Dept: FAMILY MEDICINE | Facility: CLINIC | Age: 85
End: 2020-10-05

## 2020-11-16 DIAGNOSIS — E78.2 MIXED DYSLIPIDEMIA: ICD-10-CM

## 2020-11-16 RX ORDER — ATORVASTATIN CALCIUM 40 MG/1
40 TABLET, FILM COATED ORAL DAILY
Qty: 90 TABLET | Refills: 3 | Status: SHIPPED | OUTPATIENT
Start: 2020-11-16 | End: 2021-02-09 | Stop reason: SDUPTHER

## 2020-11-16 NOTE — TELEPHONE ENCOUNTER
----- Message from Princess GUNNAR Jaeger sent at 11/16/2020 12:38 PM CST -----  Contact: spouse  Type:  RX Refill Request    Who Called: Yana Spouse  Refill or New Rx:  refill   RX Name and Strength:  atorvastatin (LIPITOR) 40 MG tablet  How is the patient currently taking it? (ex. 1XDay):   Route: Take 1 tablet (40 mg total) by mouth once daily. - Oral  Is this a 30 day or 90 day RX:  90  Preferred Pharmacy with phone number:    [address] [facility] [address] [facility]  [address] [facility] [address]  Phone: [phone] Fax: [phone]      Local or Mail Order:  Local   Ordering Provider:    Best Call Back Number:  [phone]    Additional Information:  patient is completely out.

## 2021-02-08 DIAGNOSIS — J32.9 SINUSITIS: Primary | ICD-10-CM

## 2021-02-09 DIAGNOSIS — E78.2 MIXED DYSLIPIDEMIA: ICD-10-CM

## 2021-02-09 RX ORDER — ATORVASTATIN CALCIUM 40 MG/1
40 TABLET, FILM COATED ORAL DAILY
Qty: 90 TABLET | Refills: 3 | Status: SHIPPED | OUTPATIENT
Start: 2021-02-09 | End: 2021-11-04

## 2021-02-09 RX ORDER — VALSARTAN 160 MG/1
160 TABLET ORAL DAILY
Qty: 90 TABLET | Refills: 3 | Status: SHIPPED | OUTPATIENT
Start: 2021-02-09 | End: 2021-06-21 | Stop reason: SDUPTHER

## 2021-02-12 ENCOUNTER — HOSPITAL ENCOUNTER (OUTPATIENT)
Dept: RADIOLOGY | Facility: HOSPITAL | Age: 86
Discharge: HOME | End: 2021-02-12
Attending: OTOLARYNGOLOGY
Payer: MEDICARE

## 2021-02-12 DIAGNOSIS — J32.9 SINUSITIS: ICD-10-CM

## 2021-02-12 PROCEDURE — 70486 CT MAXILLOFACIAL W/O DYE: CPT | Mod: TC,PO

## 2021-02-15 DIAGNOSIS — F41.9 ANXIETY DISORDER, UNSPECIFIED TYPE: ICD-10-CM

## 2021-02-15 RX ORDER — ALPRAZOLAM 0.25 MG/1
TABLET ORAL
Qty: 22 TABLET | Refills: 1 | Status: CANCELLED | OUTPATIENT
Start: 2021-02-15

## 2021-02-17 ENCOUNTER — OFFICE VISIT (OUTPATIENT)
Dept: PULMONOLOGY | Facility: CLINIC | Age: 86
End: 2021-02-17
Payer: MEDICARE

## 2021-02-17 ENCOUNTER — HOSPITAL ENCOUNTER (OUTPATIENT)
Dept: RADIOLOGY | Facility: HOSPITAL | Age: 86
Discharge: HOME | End: 2021-02-17
Attending: INTERNAL MEDICINE
Payer: MEDICARE

## 2021-02-17 DIAGNOSIS — J92.9 PLEURAL PLAQUE: Primary | ICD-10-CM

## 2021-02-17 DIAGNOSIS — J92.9 PLEURAL PLAQUE: ICD-10-CM

## 2021-02-17 PROCEDURE — 1159F PR MEDICATION LIST DOCUMENTED IN MEDICAL RECORD: ICD-10-PCS | Mod: S$GLB,,, | Performed by: INTERNAL MEDICINE

## 2021-02-17 PROCEDURE — 3074F PR MOST RECENT SYSTOLIC BLOOD PRESSURE < 130 MM HG: ICD-10-PCS | Mod: S$GLB,,, | Performed by: INTERNAL MEDICINE

## 2021-02-17 PROCEDURE — 99213 OFFICE O/P EST LOW 20 MIN: CPT | Mod: S$GLB,,, | Performed by: INTERNAL MEDICINE

## 2021-02-17 PROCEDURE — 3078F PR MOST RECENT DIASTOLIC BLOOD PRESSURE < 80 MM HG: ICD-10-PCS | Mod: S$GLB,,, | Performed by: INTERNAL MEDICINE

## 2021-02-17 PROCEDURE — 99213 PR OFFICE/OUTPT VISIT, EST, LEVL III, 20-29 MIN: ICD-10-PCS | Mod: S$GLB,,, | Performed by: INTERNAL MEDICINE

## 2021-02-17 PROCEDURE — 3078F DIAST BP <80 MM HG: CPT | Mod: S$GLB,,, | Performed by: INTERNAL MEDICINE

## 2021-02-17 PROCEDURE — 71046 X-RAY EXAM CHEST 2 VIEWS: CPT | Mod: TC

## 2021-02-17 PROCEDURE — 3074F SYST BP LT 130 MM HG: CPT | Mod: S$GLB,,, | Performed by: INTERNAL MEDICINE

## 2021-02-17 PROCEDURE — 1159F MED LIST DOCD IN RCRD: CPT | Mod: S$GLB,,, | Performed by: INTERNAL MEDICINE

## 2021-02-19 ENCOUNTER — IMMUNIZATION (OUTPATIENT)
Dept: FAMILY MEDICINE | Facility: CLINIC | Age: 86
End: 2021-02-19
Payer: MEDICARE

## 2021-02-19 DIAGNOSIS — Z23 NEED FOR VACCINATION: Primary | ICD-10-CM

## 2021-02-19 PROCEDURE — 0001A COVID-19, MRNA, LNP-S, PF, 30 MCG/0.3 ML DOSE VACCINE: CPT | Mod: CV19,,, | Performed by: FAMILY MEDICINE

## 2021-02-19 PROCEDURE — 91300 COVID-19, MRNA, LNP-S, PF, 30 MCG/0.3 ML DOSE VACCINE: ICD-10-PCS | Mod: ,,, | Performed by: FAMILY MEDICINE

## 2021-02-19 PROCEDURE — 0001A COVID-19, MRNA, LNP-S, PF, 30 MCG/0.3 ML DOSE VACCINE: ICD-10-PCS | Mod: CV19,,, | Performed by: FAMILY MEDICINE

## 2021-02-19 PROCEDURE — 91300 COVID-19, MRNA, LNP-S, PF, 30 MCG/0.3 ML DOSE VACCINE: CPT | Mod: ,,, | Performed by: FAMILY MEDICINE

## 2021-03-08 ENCOUNTER — OFFICE VISIT (OUTPATIENT)
Dept: FAMILY MEDICINE | Facility: CLINIC | Age: 86
End: 2021-03-08
Payer: MEDICARE

## 2021-03-08 VITALS
SYSTOLIC BLOOD PRESSURE: 138 MMHG | BODY MASS INDEX: 25.01 KG/M2 | WEIGHT: 165 LBS | HEART RATE: 69 BPM | TEMPERATURE: 98 F | HEIGHT: 68 IN | DIASTOLIC BLOOD PRESSURE: 70 MMHG

## 2021-03-08 DIAGNOSIS — E78.2 MIXED DYSLIPIDEMIA: ICD-10-CM

## 2021-03-08 DIAGNOSIS — F41.9 ANXIETY DISORDER, UNSPECIFIED TYPE: ICD-10-CM

## 2021-03-08 DIAGNOSIS — I10 ESSENTIAL HYPERTENSION: Primary | ICD-10-CM

## 2021-03-08 PROCEDURE — 1126F PR PAIN SEVERITY QUANTIFIED, NO PAIN PRESENT: ICD-10-PCS | Mod: S$GLB,,, | Performed by: INTERNAL MEDICINE

## 2021-03-08 PROCEDURE — 3075F PR MOST RECENT SYSTOLIC BLOOD PRESS GE 130-139MM HG: ICD-10-PCS | Mod: S$GLB,,, | Performed by: INTERNAL MEDICINE

## 2021-03-08 PROCEDURE — 3078F PR MOST RECENT DIASTOLIC BLOOD PRESSURE < 80 MM HG: ICD-10-PCS | Mod: S$GLB,,, | Performed by: INTERNAL MEDICINE

## 2021-03-08 PROCEDURE — 1159F PR MEDICATION LIST DOCUMENTED IN MEDICAL RECORD: ICD-10-PCS | Mod: S$GLB,,, | Performed by: INTERNAL MEDICINE

## 2021-03-08 PROCEDURE — 3075F SYST BP GE 130 - 139MM HG: CPT | Mod: S$GLB,,, | Performed by: INTERNAL MEDICINE

## 2021-03-08 PROCEDURE — 1101F PT FALLS ASSESS-DOCD LE1/YR: CPT | Mod: S$GLB,,, | Performed by: INTERNAL MEDICINE

## 2021-03-08 PROCEDURE — 99213 OFFICE O/P EST LOW 20 MIN: CPT | Mod: S$GLB,,, | Performed by: INTERNAL MEDICINE

## 2021-03-08 PROCEDURE — 99213 PR OFFICE/OUTPT VISIT, EST, LEVL III, 20-29 MIN: ICD-10-PCS | Mod: S$GLB,,, | Performed by: INTERNAL MEDICINE

## 2021-03-08 PROCEDURE — 1126F AMNT PAIN NOTED NONE PRSNT: CPT | Mod: S$GLB,,, | Performed by: INTERNAL MEDICINE

## 2021-03-08 PROCEDURE — 3288F PR FALLS RISK ASSESSMENT DOCUMENTED: ICD-10-PCS | Mod: S$GLB,,, | Performed by: INTERNAL MEDICINE

## 2021-03-08 PROCEDURE — 3078F DIAST BP <80 MM HG: CPT | Mod: S$GLB,,, | Performed by: INTERNAL MEDICINE

## 2021-03-08 PROCEDURE — 3288F FALL RISK ASSESSMENT DOCD: CPT | Mod: S$GLB,,, | Performed by: INTERNAL MEDICINE

## 2021-03-08 PROCEDURE — 1159F MED LIST DOCD IN RCRD: CPT | Mod: S$GLB,,, | Performed by: INTERNAL MEDICINE

## 2021-03-08 PROCEDURE — 1101F PR PT FALLS ASSESS DOC 0-1 FALLS W/OUT INJ PAST YR: ICD-10-PCS | Mod: S$GLB,,, | Performed by: INTERNAL MEDICINE

## 2021-03-08 RX ORDER — ALPRAZOLAM 0.25 MG/1
TABLET ORAL
Qty: 22 TABLET | Refills: 1 | Status: SHIPPED | OUTPATIENT
Start: 2021-03-08 | End: 2021-06-21 | Stop reason: SDUPTHER

## 2021-03-09 ENCOUNTER — TELEPHONE (OUTPATIENT)
Dept: FAMILY MEDICINE | Facility: CLINIC | Age: 86
End: 2021-03-09

## 2021-03-09 ENCOUNTER — LAB VISIT (OUTPATIENT)
Dept: LAB | Facility: HOSPITAL | Age: 86
End: 2021-03-09
Attending: INTERNAL MEDICINE
Payer: MEDICARE

## 2021-03-09 DIAGNOSIS — E78.2 MIXED DYSLIPIDEMIA: ICD-10-CM

## 2021-03-09 DIAGNOSIS — I10 ESSENTIAL HYPERTENSION: ICD-10-CM

## 2021-03-09 LAB
ALBUMIN SERPL BCP-MCNC: 3.9 G/DL (ref 3.5–5.2)
ALP SERPL-CCNC: 66 U/L (ref 55–135)
ALT SERPL W/O P-5'-P-CCNC: 36 U/L (ref 10–44)
ANION GAP SERPL CALC-SCNC: 10 MMOL/L (ref 8–16)
AST SERPL-CCNC: 21 U/L (ref 10–40)
BILIRUB SERPL-MCNC: 0.7 MG/DL (ref 0.1–1)
BUN SERPL-MCNC: 17 MG/DL (ref 8–23)
CALCIUM SERPL-MCNC: 9.1 MG/DL (ref 8.7–10.5)
CHLORIDE SERPL-SCNC: 103 MMOL/L (ref 95–110)
CHOLEST SERPL-MCNC: 170 MG/DL (ref 120–199)
CHOLEST/HDLC SERPL: 2 {RATIO} (ref 2–5)
CO2 SERPL-SCNC: 26 MMOL/L (ref 23–29)
CREAT SERPL-MCNC: 1.1 MG/DL (ref 0.5–1.4)
EST. GFR  (AFRICAN AMERICAN): >60 ML/MIN/1.73 M^2
EST. GFR  (NON AFRICAN AMERICAN): >60 ML/MIN/1.73 M^2
GLUCOSE SERPL-MCNC: 93 MG/DL (ref 70–110)
HDLC SERPL-MCNC: 87 MG/DL (ref 40–75)
HDLC SERPL: 51.2 % (ref 20–50)
LDLC SERPL CALC-MCNC: 74.8 MG/DL (ref 63–159)
NONHDLC SERPL-MCNC: 83 MG/DL
POTASSIUM SERPL-SCNC: 4 MMOL/L (ref 3.5–5.1)
PROT SERPL-MCNC: 7 G/DL (ref 6–8.4)
SODIUM SERPL-SCNC: 139 MMOL/L (ref 136–145)
TRIGL SERPL-MCNC: 41 MG/DL (ref 30–150)

## 2021-03-09 PROCEDURE — 36415 COLL VENOUS BLD VENIPUNCTURE: CPT | Performed by: INTERNAL MEDICINE

## 2021-03-09 PROCEDURE — 80053 COMPREHEN METABOLIC PANEL: CPT | Performed by: INTERNAL MEDICINE

## 2021-03-09 PROCEDURE — 80061 LIPID PANEL: CPT | Performed by: INTERNAL MEDICINE

## 2021-03-12 ENCOUNTER — IMMUNIZATION (OUTPATIENT)
Dept: FAMILY MEDICINE | Facility: CLINIC | Age: 86
End: 2021-03-12
Payer: MEDICARE

## 2021-03-12 DIAGNOSIS — Z23 NEED FOR VACCINATION: Primary | ICD-10-CM

## 2021-03-12 PROCEDURE — 0002A COVID-19, MRNA, LNP-S, PF, 30 MCG/0.3 ML DOSE VACCINE: CPT | Mod: CV19,S$GLB,, | Performed by: FAMILY MEDICINE

## 2021-03-12 PROCEDURE — 91300 COVID-19, MRNA, LNP-S, PF, 30 MCG/0.3 ML DOSE VACCINE: ICD-10-PCS | Mod: S$GLB,,, | Performed by: FAMILY MEDICINE

## 2021-03-12 PROCEDURE — 0002A COVID-19, MRNA, LNP-S, PF, 30 MCG/0.3 ML DOSE VACCINE: ICD-10-PCS | Mod: CV19,S$GLB,, | Performed by: FAMILY MEDICINE

## 2021-03-12 PROCEDURE — 91300 COVID-19, MRNA, LNP-S, PF, 30 MCG/0.3 ML DOSE VACCINE: CPT | Mod: S$GLB,,, | Performed by: FAMILY MEDICINE

## 2021-06-21 ENCOUNTER — OFFICE VISIT (OUTPATIENT)
Dept: FAMILY MEDICINE | Facility: CLINIC | Age: 86
End: 2021-06-21
Payer: MEDICARE

## 2021-06-21 VITALS
HEART RATE: 64 BPM | SYSTOLIC BLOOD PRESSURE: 132 MMHG | WEIGHT: 160 LBS | HEIGHT: 68 IN | DIASTOLIC BLOOD PRESSURE: 70 MMHG | BODY MASS INDEX: 24.25 KG/M2

## 2021-06-21 DIAGNOSIS — E78.2 MIXED DYSLIPIDEMIA: ICD-10-CM

## 2021-06-21 DIAGNOSIS — R53.83 OTHER FATIGUE: ICD-10-CM

## 2021-06-21 DIAGNOSIS — K21.9 GASTROESOPHAGEAL REFLUX DISEASE WITHOUT ESOPHAGITIS: ICD-10-CM

## 2021-06-21 DIAGNOSIS — I10 ESSENTIAL HYPERTENSION: Primary | ICD-10-CM

## 2021-06-21 DIAGNOSIS — F41.9 ANXIETY DISORDER, UNSPECIFIED TYPE: ICD-10-CM

## 2021-06-21 PROCEDURE — 1126F PR PAIN SEVERITY QUANTIFIED, NO PAIN PRESENT: ICD-10-PCS | Mod: S$GLB,,, | Performed by: INTERNAL MEDICINE

## 2021-06-21 PROCEDURE — 99213 OFFICE O/P EST LOW 20 MIN: CPT | Mod: S$GLB,,, | Performed by: INTERNAL MEDICINE

## 2021-06-21 PROCEDURE — 3078F DIAST BP <80 MM HG: CPT | Mod: S$GLB,,, | Performed by: INTERNAL MEDICINE

## 2021-06-21 PROCEDURE — 1159F MED LIST DOCD IN RCRD: CPT | Mod: S$GLB,,, | Performed by: INTERNAL MEDICINE

## 2021-06-21 PROCEDURE — 3288F FALL RISK ASSESSMENT DOCD: CPT | Mod: S$GLB,,, | Performed by: INTERNAL MEDICINE

## 2021-06-21 PROCEDURE — 1126F AMNT PAIN NOTED NONE PRSNT: CPT | Mod: S$GLB,,, | Performed by: INTERNAL MEDICINE

## 2021-06-21 PROCEDURE — 3288F PR FALLS RISK ASSESSMENT DOCUMENTED: ICD-10-PCS | Mod: S$GLB,,, | Performed by: INTERNAL MEDICINE

## 2021-06-21 PROCEDURE — 3075F SYST BP GE 130 - 139MM HG: CPT | Mod: S$GLB,,, | Performed by: INTERNAL MEDICINE

## 2021-06-21 PROCEDURE — 1101F PT FALLS ASSESS-DOCD LE1/YR: CPT | Mod: S$GLB,,, | Performed by: INTERNAL MEDICINE

## 2021-06-21 PROCEDURE — 3078F PR MOST RECENT DIASTOLIC BLOOD PRESSURE < 80 MM HG: ICD-10-PCS | Mod: S$GLB,,, | Performed by: INTERNAL MEDICINE

## 2021-06-21 PROCEDURE — 1159F PR MEDICATION LIST DOCUMENTED IN MEDICAL RECORD: ICD-10-PCS | Mod: S$GLB,,, | Performed by: INTERNAL MEDICINE

## 2021-06-21 PROCEDURE — 3075F PR MOST RECENT SYSTOLIC BLOOD PRESS GE 130-139MM HG: ICD-10-PCS | Mod: S$GLB,,, | Performed by: INTERNAL MEDICINE

## 2021-06-21 PROCEDURE — 1101F PR PT FALLS ASSESS DOC 0-1 FALLS W/OUT INJ PAST YR: ICD-10-PCS | Mod: S$GLB,,, | Performed by: INTERNAL MEDICINE

## 2021-06-21 PROCEDURE — 99213 PR OFFICE/OUTPT VISIT, EST, LEVL III, 20-29 MIN: ICD-10-PCS | Mod: S$GLB,,, | Performed by: INTERNAL MEDICINE

## 2021-06-21 RX ORDER — VALSARTAN 160 MG/1
160 TABLET ORAL DAILY
Qty: 90 TABLET | Refills: 3 | Status: CANCELLED | OUTPATIENT
Start: 2021-06-21

## 2021-06-21 RX ORDER — ALPRAZOLAM 0.25 MG/1
TABLET ORAL
Qty: 22 TABLET | Refills: 1 | Status: SHIPPED | OUTPATIENT
Start: 2021-06-21 | End: 2022-03-15 | Stop reason: SDUPTHER

## 2021-06-21 RX ORDER — VALSARTAN 160 MG/1
160 TABLET ORAL DAILY
Qty: 90 TABLET | Refills: 3 | Status: SHIPPED | OUTPATIENT
Start: 2021-06-21 | End: 2021-08-02 | Stop reason: SDUPTHER

## 2021-07-19 NOTE — TELEPHONE ENCOUNTER
----- Message from Rivka Moulton LPN sent at 10/5/2020 11:45 AM CDT -----  Regarding: FW: Labs and PSA  Contact: patient [phone]    ----- Message -----  From: Beny Fay MD  Sent: 10/4/2020   7:57 PM CDT  To: Rivka Moulton LPN  Subject: Labs and PSA                                     PSA test was ordered by Urologist and was WNL    In the month of may AAA ultrasound screen was performed which was normal.  ----- Message -----  From: Rivka Moulton LPN  Sent: 9/28/2020  11:00 AM CDT  To: Beny Fay MD      ----- Message -----  From: Elena Goodwin  Sent: 9/25/2020   2:20 PM CDT  To: Nya Swan Staff    Calling to get test results.  Name of test (lab, xray, etc.):   psa  Date of test:  9/16/20  Ordering provider:   Where was the test performed:    Would the patient rather a call back or a response via [facility]?:  call  Comments:               
Left mess   
4

## 2021-08-02 DIAGNOSIS — I10 ESSENTIAL HYPERTENSION: ICD-10-CM

## 2021-08-02 NOTE — TELEPHONE ENCOUNTER
----- Message from Cierra Swan sent at 8/2/2021  1:01 PM CDT -----  Regarding: refill  valsartan (DIOVAN) 160 MG tablet 90 tablet 3 6/21/2021  No  Sig - Route: Take 1 tablet (160 mg total) by mouth once daily. - Oral  Sent to pharmacy as: valsartan (DIOVAN) 160 MG tablet  Class: Normal  Notes to Pharmacy: .  Order: [id number]  Date/Time Signed: 6/21/2021 10:50      E-Prescribing Status: Receipt confirmed by pharmacy (6/21/2021 10:50 AM CDT)  Pharmacy      [facility] [address] [place] [address] [id number]      >>>[facility] calling says pt wants to get prescription through them

## 2021-08-03 RX ORDER — VALSARTAN 160 MG/1
160 TABLET ORAL DAILY
Qty: 90 TABLET | Refills: 3 | Status: SHIPPED | OUTPATIENT
Start: 2021-08-03 | End: 2022-06-28

## 2021-09-16 ENCOUNTER — LAB VISIT (OUTPATIENT)
Dept: LAB | Facility: HOSPITAL | Age: 86
End: 2021-09-16
Attending: UROLOGY
Payer: MEDICARE

## 2021-09-16 ENCOUNTER — OFFICE VISIT (OUTPATIENT)
Dept: UROLOGY | Facility: CLINIC | Age: 86
End: 2021-09-16
Payer: MEDICARE

## 2021-09-16 VITALS — HEIGHT: 68 IN | BODY MASS INDEX: 24.49 KG/M2 | WEIGHT: 161.63 LBS

## 2021-09-16 DIAGNOSIS — R35.1 BENIGN PROSTATIC HYPERPLASIA WITH NOCTURIA: ICD-10-CM

## 2021-09-16 DIAGNOSIS — N40.1 BENIGN PROSTATIC HYPERPLASIA WITH NOCTURIA: ICD-10-CM

## 2021-09-16 DIAGNOSIS — Z12.5 SCREENING FOR PROSTATE CANCER: Primary | ICD-10-CM

## 2021-09-16 DIAGNOSIS — Z12.5 SCREENING FOR PROSTATE CANCER: ICD-10-CM

## 2021-09-16 LAB — COMPLEXED PSA SERPL-MCNC: 2.4 NG/ML (ref 0–4)

## 2021-09-16 PROCEDURE — 1101F PT FALLS ASSESS-DOCD LE1/YR: CPT | Mod: CPTII,S$GLB,, | Performed by: UROLOGY

## 2021-09-16 PROCEDURE — 1126F PR PAIN SEVERITY QUANTIFIED, NO PAIN PRESENT: ICD-10-PCS | Mod: CPTII,S$GLB,, | Performed by: UROLOGY

## 2021-09-16 PROCEDURE — 1159F MED LIST DOCD IN RCRD: CPT | Mod: CPTII,S$GLB,, | Performed by: UROLOGY

## 2021-09-16 PROCEDURE — 1160F RVW MEDS BY RX/DR IN RCRD: CPT | Mod: CPTII,S$GLB,, | Performed by: UROLOGY

## 2021-09-16 PROCEDURE — 36415 COLL VENOUS BLD VENIPUNCTURE: CPT | Mod: PO | Performed by: UROLOGY

## 2021-09-16 PROCEDURE — 1101F PR PT FALLS ASSESS DOC 0-1 FALLS W/OUT INJ PAST YR: ICD-10-PCS | Mod: CPTII,S$GLB,, | Performed by: UROLOGY

## 2021-09-16 PROCEDURE — 1159F PR MEDICATION LIST DOCUMENTED IN MEDICAL RECORD: ICD-10-PCS | Mod: CPTII,S$GLB,, | Performed by: UROLOGY

## 2021-09-16 PROCEDURE — 84153 ASSAY OF PSA TOTAL: CPT | Performed by: UROLOGY

## 2021-09-16 PROCEDURE — 3288F PR FALLS RISK ASSESSMENT DOCUMENTED: ICD-10-PCS | Mod: CPTII,S$GLB,, | Performed by: UROLOGY

## 2021-09-16 PROCEDURE — 1160F PR REVIEW ALL MEDS BY PRESCRIBER/CLIN PHARMACIST DOCUMENTED: ICD-10-PCS | Mod: CPTII,S$GLB,, | Performed by: UROLOGY

## 2021-09-16 PROCEDURE — 99214 PR OFFICE/OUTPT VISIT, EST, LEVL IV, 30-39 MIN: ICD-10-PCS | Mod: S$GLB,,, | Performed by: UROLOGY

## 2021-09-16 PROCEDURE — 99214 OFFICE O/P EST MOD 30 MIN: CPT | Mod: S$GLB,,, | Performed by: UROLOGY

## 2021-09-16 PROCEDURE — 1126F AMNT PAIN NOTED NONE PRSNT: CPT | Mod: CPTII,S$GLB,, | Performed by: UROLOGY

## 2021-09-16 PROCEDURE — 99999 PR PBB SHADOW E&M-EST. PATIENT-LVL III: CPT | Mod: PBBFAC,,, | Performed by: UROLOGY

## 2021-09-16 PROCEDURE — 99999 PR PBB SHADOW E&M-EST. PATIENT-LVL III: ICD-10-PCS | Mod: PBBFAC,,, | Performed by: UROLOGY

## 2021-09-16 PROCEDURE — 3288F FALL RISK ASSESSMENT DOCD: CPT | Mod: CPTII,S$GLB,, | Performed by: UROLOGY

## 2021-09-16 RX ORDER — KETOCONAZOLE 20 MG/ML
SHAMPOO, SUSPENSION TOPICAL
COMMUNITY
Start: 2021-07-20 | End: 2022-03-15

## 2021-09-16 RX ORDER — CLOBETASOL PROPIONATE 0.46 MG/ML
SOLUTION TOPICAL
COMMUNITY
Start: 2021-08-19 | End: 2022-03-15

## 2021-10-11 ENCOUNTER — TELEPHONE (OUTPATIENT)
Dept: UROLOGY | Facility: CLINIC | Age: 86
End: 2021-10-11

## 2021-10-15 ENCOUNTER — LAB VISIT (OUTPATIENT)
Dept: LAB | Facility: HOSPITAL | Age: 86
End: 2021-10-15
Attending: INTERNAL MEDICINE
Payer: MEDICARE

## 2021-10-15 DIAGNOSIS — R53.83 OTHER FATIGUE: ICD-10-CM

## 2021-10-15 DIAGNOSIS — I10 ESSENTIAL HYPERTENSION: ICD-10-CM

## 2021-10-15 LAB
ALBUMIN SERPL BCP-MCNC: 4 G/DL (ref 3.5–5.2)
ALP SERPL-CCNC: 58 U/L (ref 55–135)
ALT SERPL W/O P-5'-P-CCNC: 28 U/L (ref 10–44)
ANION GAP SERPL CALC-SCNC: 8 MMOL/L (ref 8–16)
AST SERPL-CCNC: 21 U/L (ref 10–40)
BASOPHILS # BLD AUTO: 0.07 K/UL (ref 0–0.2)
BASOPHILS NFR BLD: 1.2 % (ref 0–1.9)
BILIRUB SERPL-MCNC: 1.1 MG/DL (ref 0.1–1)
BUN SERPL-MCNC: 18 MG/DL (ref 8–23)
CALCIUM SERPL-MCNC: 9 MG/DL (ref 8.7–10.5)
CHLORIDE SERPL-SCNC: 109 MMOL/L (ref 95–110)
CO2 SERPL-SCNC: 25 MMOL/L (ref 23–29)
CREAT SERPL-MCNC: 1.2 MG/DL (ref 0.5–1.4)
DIFFERENTIAL METHOD: NORMAL
EOSINOPHIL # BLD AUTO: 0.1 K/UL (ref 0–0.5)
EOSINOPHIL NFR BLD: 1.5 % (ref 0–8)
ERYTHROCYTE [DISTWIDTH] IN BLOOD BY AUTOMATED COUNT: 13.8 % (ref 11.5–14.5)
EST. GFR  (AFRICAN AMERICAN): >60 ML/MIN/1.73 M^2
EST. GFR  (NON AFRICAN AMERICAN): 54.4 ML/MIN/1.73 M^2
GLUCOSE SERPL-MCNC: 94 MG/DL (ref 70–110)
HCT VFR BLD AUTO: 43 % (ref 40–54)
HGB BLD-MCNC: 14.2 G/DL (ref 14–18)
IMM GRANULOCYTES # BLD AUTO: 0.03 K/UL (ref 0–0.04)
IMM GRANULOCYTES NFR BLD AUTO: 0.5 % (ref 0–0.5)
LYMPHOCYTES # BLD AUTO: 2.5 K/UL (ref 1–4.8)
LYMPHOCYTES NFR BLD: 41.7 % (ref 18–48)
MCH RBC QN AUTO: 29.1 PG (ref 27–31)
MCHC RBC AUTO-ENTMCNC: 33 G/DL (ref 32–36)
MCV RBC AUTO: 88 FL (ref 82–98)
MONOCYTES # BLD AUTO: 0.5 K/UL (ref 0.3–1)
MONOCYTES NFR BLD: 7.6 % (ref 4–15)
NEUTROPHILS # BLD AUTO: 2.9 K/UL (ref 1.8–7.7)
NEUTROPHILS NFR BLD: 47.5 % (ref 38–73)
NRBC BLD-RTO: 0 /100 WBC
PLATELET # BLD AUTO: 171 K/UL (ref 150–450)
PMV BLD AUTO: 9.9 FL (ref 9.2–12.9)
POTASSIUM SERPL-SCNC: 4.1 MMOL/L (ref 3.5–5.1)
PROT SERPL-MCNC: 7.1 G/DL (ref 6–8.4)
RBC # BLD AUTO: 4.88 M/UL (ref 4.6–6.2)
SODIUM SERPL-SCNC: 142 MMOL/L (ref 136–145)
WBC # BLD AUTO: 6.07 K/UL (ref 3.9–12.7)

## 2021-10-15 PROCEDURE — 36415 COLL VENOUS BLD VENIPUNCTURE: CPT | Performed by: INTERNAL MEDICINE

## 2021-10-15 PROCEDURE — 85025 COMPLETE CBC W/AUTO DIFF WBC: CPT | Performed by: INTERNAL MEDICINE

## 2021-10-15 PROCEDURE — 80053 COMPREHEN METABOLIC PANEL: CPT | Performed by: INTERNAL MEDICINE

## 2021-10-18 ENCOUNTER — OFFICE VISIT (OUTPATIENT)
Dept: FAMILY MEDICINE | Facility: CLINIC | Age: 86
End: 2021-10-18
Payer: MEDICARE

## 2021-10-18 ENCOUNTER — TELEPHONE (OUTPATIENT)
Dept: FAMILY MEDICINE | Facility: CLINIC | Age: 86
End: 2021-10-18

## 2021-10-18 VITALS
DIASTOLIC BLOOD PRESSURE: 86 MMHG | HEART RATE: 65 BPM | WEIGHT: 159 LBS | HEIGHT: 68 IN | SYSTOLIC BLOOD PRESSURE: 139 MMHG | BODY MASS INDEX: 24.1 KG/M2 | TEMPERATURE: 98 F

## 2021-10-18 DIAGNOSIS — E78.2 MIXED DYSLIPIDEMIA: ICD-10-CM

## 2021-10-18 DIAGNOSIS — F41.9 ANXIETY DISORDER, UNSPECIFIED TYPE: Chronic | ICD-10-CM

## 2021-10-18 DIAGNOSIS — I10 ESSENTIAL HYPERTENSION: Primary | Chronic | ICD-10-CM

## 2021-10-18 DIAGNOSIS — Z23 NEED FOR INFLUENZA VACCINATION: ICD-10-CM

## 2021-10-18 DIAGNOSIS — H90.3 SENSORINEURAL HEARING LOSS (SNHL) OF BOTH EARS: ICD-10-CM

## 2021-10-18 DIAGNOSIS — K21.9 GASTROESOPHAGEAL REFLUX DISEASE WITHOUT ESOPHAGITIS: ICD-10-CM

## 2021-10-18 PROCEDURE — 90662 FLU VACCINE - QUADRIVALENT - HIGH DOSE (65+) PRESERVATIVE FREE IM: ICD-10-PCS | Mod: S$GLB,,, | Performed by: INTERNAL MEDICINE

## 2021-10-18 PROCEDURE — 3288F FALL RISK ASSESSMENT DOCD: CPT | Mod: S$GLB,,, | Performed by: INTERNAL MEDICINE

## 2021-10-18 PROCEDURE — 1101F PR PT FALLS ASSESS DOC 0-1 FALLS W/OUT INJ PAST YR: ICD-10-PCS | Mod: S$GLB,,, | Performed by: INTERNAL MEDICINE

## 2021-10-18 PROCEDURE — 1126F PR PAIN SEVERITY QUANTIFIED, NO PAIN PRESENT: ICD-10-PCS | Mod: S$GLB,,, | Performed by: INTERNAL MEDICINE

## 2021-10-18 PROCEDURE — 1126F AMNT PAIN NOTED NONE PRSNT: CPT | Mod: S$GLB,,, | Performed by: INTERNAL MEDICINE

## 2021-10-18 PROCEDURE — 90662 IIV NO PRSV INCREASED AG IM: CPT | Mod: S$GLB,,, | Performed by: INTERNAL MEDICINE

## 2021-10-18 PROCEDURE — G0008 ADMIN INFLUENZA VIRUS VAC: HCPCS | Mod: S$GLB,,, | Performed by: INTERNAL MEDICINE

## 2021-10-18 PROCEDURE — 1160F PR REVIEW ALL MEDS BY PRESCRIBER/CLIN PHARMACIST DOCUMENTED: ICD-10-PCS | Mod: S$GLB,,, | Performed by: INTERNAL MEDICINE

## 2021-10-18 PROCEDURE — G0008 FLU VACCINE - QUADRIVALENT - HIGH DOSE (65+) PRESERVATIVE FREE IM: ICD-10-PCS | Mod: S$GLB,,, | Performed by: INTERNAL MEDICINE

## 2021-10-18 PROCEDURE — 1159F MED LIST DOCD IN RCRD: CPT | Mod: S$GLB,,, | Performed by: INTERNAL MEDICINE

## 2021-10-18 PROCEDURE — 3288F PR FALLS RISK ASSESSMENT DOCUMENTED: ICD-10-PCS | Mod: S$GLB,,, | Performed by: INTERNAL MEDICINE

## 2021-10-18 PROCEDURE — 1159F PR MEDICATION LIST DOCUMENTED IN MEDICAL RECORD: ICD-10-PCS | Mod: S$GLB,,, | Performed by: INTERNAL MEDICINE

## 2021-10-18 PROCEDURE — 1101F PT FALLS ASSESS-DOCD LE1/YR: CPT | Mod: S$GLB,,, | Performed by: INTERNAL MEDICINE

## 2021-10-18 PROCEDURE — 99213 PR OFFICE/OUTPT VISIT, EST, LEVL III, 20-29 MIN: ICD-10-PCS | Mod: 25,S$GLB,, | Performed by: INTERNAL MEDICINE

## 2021-10-18 PROCEDURE — 1160F RVW MEDS BY RX/DR IN RCRD: CPT | Mod: S$GLB,,, | Performed by: INTERNAL MEDICINE

## 2021-10-18 PROCEDURE — 99213 OFFICE O/P EST LOW 20 MIN: CPT | Mod: 25,S$GLB,, | Performed by: INTERNAL MEDICINE

## 2021-10-22 ENCOUNTER — TELEPHONE (OUTPATIENT)
Dept: UROLOGY | Facility: CLINIC | Age: 86
End: 2021-10-22

## 2021-11-18 ENCOUNTER — IMMUNIZATION (OUTPATIENT)
Dept: PRIMARY CARE CLINIC | Facility: CLINIC | Age: 86
End: 2021-11-18
Payer: MEDICARE

## 2021-11-18 DIAGNOSIS — Z23 NEED FOR VACCINATION: Primary | ICD-10-CM

## 2021-11-18 PROCEDURE — 91300 COVID-19, MRNA, LNP-S, PF, 30 MCG/0.3 ML DOSE VACCINE: ICD-10-PCS | Mod: S$GLB,,, | Performed by: FAMILY MEDICINE

## 2021-11-18 PROCEDURE — 91300 COVID-19, MRNA, LNP-S, PF, 30 MCG/0.3 ML DOSE VACCINE: CPT | Mod: S$GLB,,, | Performed by: FAMILY MEDICINE

## 2021-11-18 PROCEDURE — 0004A COVID-19, MRNA, LNP-S, PF, 30 MCG/0.3 ML DOSE VACCINE: ICD-10-PCS | Mod: CV19,S$GLB,, | Performed by: FAMILY MEDICINE

## 2021-11-18 PROCEDURE — 0004A COVID-19, MRNA, LNP-S, PF, 30 MCG/0.3 ML DOSE VACCINE: CPT | Mod: CV19,S$GLB,, | Performed by: FAMILY MEDICINE

## 2022-02-16 ENCOUNTER — HOSPITAL ENCOUNTER (OUTPATIENT)
Dept: RADIOLOGY | Facility: HOSPITAL | Age: 87
Discharge: HOME | End: 2022-02-16
Attending: INTERNAL MEDICINE
Payer: MEDICARE

## 2022-02-16 ENCOUNTER — OFFICE VISIT (OUTPATIENT)
Dept: PULMONOLOGY | Facility: CLINIC | Age: 87
End: 2022-02-16
Payer: MEDICARE

## 2022-02-16 VITALS
OXYGEN SATURATION: 98 % | WEIGHT: 163 LBS | DIASTOLIC BLOOD PRESSURE: 68 MMHG | BODY MASS INDEX: 24.78 KG/M2 | SYSTOLIC BLOOD PRESSURE: 142 MMHG | HEART RATE: 74 BPM

## 2022-02-16 DIAGNOSIS — J92.0 ASBESTOS-INDUCED PLEURAL PLAQUE: ICD-10-CM

## 2022-02-16 DIAGNOSIS — J92.0 ASBESTOS-INDUCED PLEURAL PLAQUE: Primary | ICD-10-CM

## 2022-02-16 PROCEDURE — 99214 PR OFFICE/OUTPT VISIT, EST, LEVL IV, 30-39 MIN: ICD-10-PCS | Mod: S$GLB,,, | Performed by: INTERNAL MEDICINE

## 2022-02-16 PROCEDURE — 71046 X-RAY EXAM CHEST 2 VIEWS: CPT | Mod: TC

## 2022-02-16 PROCEDURE — 3288F FALL RISK ASSESSMENT DOCD: CPT | Mod: S$GLB,,, | Performed by: INTERNAL MEDICINE

## 2022-02-16 PROCEDURE — 1160F RVW MEDS BY RX/DR IN RCRD: CPT | Mod: S$GLB,,, | Performed by: INTERNAL MEDICINE

## 2022-02-16 PROCEDURE — 1160F PR REVIEW ALL MEDS BY PRESCRIBER/CLIN PHARMACIST DOCUMENTED: ICD-10-PCS | Mod: S$GLB,,, | Performed by: INTERNAL MEDICINE

## 2022-02-16 PROCEDURE — 1159F MED LIST DOCD IN RCRD: CPT | Mod: S$GLB,,, | Performed by: INTERNAL MEDICINE

## 2022-02-16 PROCEDURE — 3288F PR FALLS RISK ASSESSMENT DOCUMENTED: ICD-10-PCS | Mod: S$GLB,,, | Performed by: INTERNAL MEDICINE

## 2022-02-16 PROCEDURE — 1101F PR PT FALLS ASSESS DOC 0-1 FALLS W/OUT INJ PAST YR: ICD-10-PCS | Mod: S$GLB,,, | Performed by: INTERNAL MEDICINE

## 2022-02-16 PROCEDURE — 1101F PT FALLS ASSESS-DOCD LE1/YR: CPT | Mod: S$GLB,,, | Performed by: INTERNAL MEDICINE

## 2022-02-16 PROCEDURE — 1159F PR MEDICATION LIST DOCUMENTED IN MEDICAL RECORD: ICD-10-PCS | Mod: S$GLB,,, | Performed by: INTERNAL MEDICINE

## 2022-02-16 PROCEDURE — 99214 OFFICE O/P EST MOD 30 MIN: CPT | Mod: S$GLB,,, | Performed by: INTERNAL MEDICINE

## 2022-02-16 NOTE — PROGRESS NOTES
Office Visit    Patient Name: Bernardo Oliveira  MRN: [id number]  : 1935      Reason for visit: Abnormal CXR    HPI:     2018 - 81 yo male was in Urgent Care with bronchitis had CXR showig a lung nodule.  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  We reviewed old films in [facility] - CXR in  looks unchanged ad that report states that it is unchanged since  (making this stable for about 11 years).  He has no symptoms.  He did smoke (probably < 20 pack years and quit > 40 years ago).  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    2019 - Here for follow up, stable, no new respiratory complaints.  Has not been in ER or hospitalized.  Had a sinus infection (or possibly the flu) in January - better now.    2020 - Here for follow up, has done well over the last year - no hospitalizations, no new diagnoses.  No respiratory symptoms - he remains active (mowing the lawn).  Did travel to [place] - no known exposure to corona virus.    2021 - Here for follow up, no new respiratory symptoms except for occasional allergy (postnasal drip, throat congestion).  No recent hospitalizations and no new diagnoses.  Patient has no known corona virus exposures and has been practicing social distancing.  We have discussed the virus and precautions and all questions have been answered.  Reports better BP control at home and has had issues with increased BP at MD visit.    2022 - Here for follow up, doing well with no new complaints.  No hospitalizations.  No falls or injuries.  Patient has no known corona virus exposures and has been practicing social distancing.  We have discussed the virus and precautions and all questions have been answered.    Vaccination Status    Flu vaccination status - +     Pneumonia vaccination status - +    Covid vaccination status - + Pfizer and  booster    Tetanus/diptheria vaccination status - +    Shingles vaccination status - +          Past Medical History    Past Medical History:   Diagnosis Date    Anxiety     Asbestos-induced pleural plaque 2020    Depression     GERD (gastroesophageal reflux disease)     Hyperlipidemia     Hypertension     Vocal cord cancer        Past Surgical History    Past Surgical History:   Procedure Laterality Date    CATARACT EXTRACTION, BILATERAL      CHOLECYSTECTOMY      CIRCUMCISION      INGUINAL HERNIA REPAIR Right 2000    INGUINAL HERNIA REPAIR Left     RETINAL DETACHMENT SURGERY      THROAT SURGERY      X 2 for vocal cord cancer    TONSILLECTOMY      TRANSURETHRAL RESECTION OF PROSTATE      dr cindi ryan - [facility]       Medications      Current Outpatient Medications:     ALPRAZolam (XANAX) 0.25 MG tablet, Take 1 pill rarely as needed for anxiety and elevated blood pressure.  Not to exceed more than 2 pills in a week., Disp: 22 tablet, Rfl: 1    atorvastatin (LIPITOR) 40 MG tablet, TAKE 1 TABLET EVERY DAY, Disp: 90 tablet, Rfl: 3    clobetasoL (TEMOVATE) 0.05 % external solution, , Disp: , Rfl:     ketoconazole (NIZORAL) 2 % shampoo, , Disp: , Rfl:     metoprolol succinate (TOPROL-XL) 25 MG 24 hr tablet, TAKE 1 TABLET EVERY DAY, Disp: 90 tablet, Rfl: 1    pantoprazole (PROTONIX) 40 MG tablet, TAKE 1 TABLET EVERY MORNING, Disp: 90 tablet, Rfl: 1    valsartan (DIOVAN) 160 MG tablet, Take 1 tablet (160 mg total) by mouth once daily., Disp: 90 tablet, Rfl: 3    Allergies    Review of patient's allergies indicates:   Allergen Reactions    Codeine        SocHx    Social History     Tobacco Use   Smoking Status Former Smoker    Packs/day: 1.50    Types: Cigarettes    Quit date: 1982    Years since quittin.7   Smokeless Tobacco Never Used       Social History     Substance and Sexual Activity   Alcohol Use Not Currently       Drug Use - no  Occupation - retired, worked as   on [employer]  Asbestos exposure - +    Review of Systems  Review of Systems   Constitutional: Negative for chills, diaphoresis, fever, malaise/fatigue and weight loss.   HENT: Negative for congestion.    Eyes: Negative for pain.   Respiratory: Negative for cough, hemoptysis, sputum production, shortness of breath, wheezing and stridor.    Cardiovascular: Negative for chest pain, palpitations, orthopnea, claudication, leg swelling and PND.   Gastrointestinal: Negative for abdominal pain, constipation, diarrhea, heartburn, nausea and vomiting.   Genitourinary: Negative for dysuria, frequency and urgency.   Musculoskeletal: Negative for falls and myalgias.   Neurological: Negative for sensory change, focal weakness and weakness.   Psychiatric/Behavioral: Negative for depression. The patient is not nervous/anxious.        Physical Exam    Vitals:    02/16/22 0853   BP: (!) 142/68   BP Location: Left arm   Patient Position: Sitting   BP Method: X-Large (Manual)   Pulse: 74   SpO2: 98%   Weight: 73.9 kg (163 lb)       Physical Exam  Vitals and nursing note reviewed.   Constitutional:       General: He is not in acute distress.     Appearance: He is well-developed. He is not diaphoretic.   HENT:      Head: Normocephalic and atraumatic.      Right Ear: External ear normal.      Left Ear: External ear normal.      Nose: Nose normal.   Eyes:      Pupils: Pupils are equal, round, and reactive to light.   Neck:      Thyroid: No thyromegaly.      Vascular: No JVD.      Trachea: No tracheal deviation.   Cardiovascular:      Rate and Rhythm: Normal rate and regular rhythm.      Heart sounds: Normal heart sounds. No murmur heard.  No friction rub. No gallop.    Pulmonary:      Effort: Pulmonary effort is normal. No respiratory distress.      Breath sounds: Normal breath sounds. No stridor. No wheezing or rales.   Chest:      Chest wall: No tenderness.   Abdominal:      General: Bowel sounds are normal. There is no  distension.      Palpations: Abdomen is soft.   Musculoskeletal:         General: No tenderness. Normal range of motion.      Cervical back: Normal range of motion and neck supple.   Lymphadenopathy:      Cervical: No cervical adenopathy.   Neurological:      Mental Status: He is alert and oriented to person, place, and time.      Cranial Nerves: No cranial nerve deficit.      Deep Tendon Reflexes: Reflexes are normal and symmetric.   Psychiatric:         Behavior: Behavior normal.         Labs    Lab Results   Component Value Date    WBC 6.07 10/15/2021    HGB 14.2 10/15/2021    HCT 43.0 10/15/2021     10/15/2021       Sodium   Date Value Ref Range Status   10/15/2021 142 136 - 145 mmol/L Final   02/15/2019 140 134 - 144 mmol/L      Potassium   Date Value Ref Range Status   10/15/2021 4.1 3.5 - 5.1 mmol/L Final     Chloride   Date Value Ref Range Status   10/15/2021 109 95 - 110 mmol/L Final   02/15/2019 105 98 - 110 mmol/L      CO2   Date Value Ref Range Status   10/15/2021 25 23 - 29 mmol/L Final     Glucose   Date Value Ref Range Status   10/15/2021 94 70 - 110 mg/dL Final   02/15/2019 98 70 - 99 mg/dL      BUN   Date Value Ref Range Status   10/15/2021 18 8 - 23 mg/dL Final     Creatinine   Date Value Ref Range Status   10/15/2021 1.2 0.5 - 1.4 mg/dL Final   02/15/2019 0.98 0.60 - 1.40 mg/dL    05/28/2012 0.8 0.2 - 1.4 mg/dl Final     Calcium   Date Value Ref Range Status   10/15/2021 9.0 8.7 - 10.5 mg/dL Final   05/28/2012 9.3 8.6 - 10.2 mg/dl Final     Total Protein   Date Value Ref Range Status   10/15/2021 7.1 6.0 - 8.4 g/dL Final     Albumin   Date Value Ref Range Status   10/15/2021 4.0 3.5 - 5.2 g/dL Final   02/15/2019 3.9 3.1 - 4.7 g/dL      Total Bilirubin   Date Value Ref Range Status   10/15/2021 1.1 (H) 0.1 - 1.0 mg/dL Final     Comment:     For infants and newborns, interpretation of results should be based  on gestational age, weight and in agreement with  clinical  observations.    Premature Infant recommended reference ranges:  Up to 24 hours.............<8.0 mg/dL  Up to 48 hours............<12.0 mg/dL  3-5 days..................<15.0 mg/dL  6-29 days.................<15.0 mg/dL       Alkaline Phosphatase   Date Value Ref Range Status   10/15/2021 58 55 - 135 U/L Final   05/28/2012 64 23 - 119 UNIT/L Final     AST   Date Value Ref Range Status   10/15/2021 21 10 - 40 U/L Final   05/28/2012 17 10 - 34 UNIT/L Final     ALT   Date Value Ref Range Status   10/15/2021 28 10 - 44 U/L Final     Anion Gap   Date Value Ref Range Status   10/15/2021 8 8 - 16 mmol/L Final   05/28/2012 7 5 - 15 meq/L Final       Xrays    CT OF THE CHEST WITHOUT  INTRAVENOUS CONTRAST    CLINICAL INFORMATION: Abnormal chest x-ray.    COMPARISON STUDIES: Chest x-rays dated 2/12/2019, 8/27/2015 and 2/11/2013.    FINDINGS : There are minimal arteriosclerotic changes in the aorta without  aneurysm.    No mediastinal mass are lymphadenopathy noted.    Tracheobronchial tree, heart and pericardium are normal.    There are several small pleural calcified plaques anteriorly in the right upper  lobe, some of which were seen on the chest x-ray and account for the chest  x-ray findings. No distinct mass is identified.    Lungs are clear with no lung nodule, mass, groundglass or airspace opacities.    There is no pleural effusion.    The pleura in the rest of the chest is normal.    No osseous abnormality seen.    There are multiple benign cortical and peripelvic right renal cyst in the  visualized portion of the right upper kidney.        IMPRESSION:    1. Opacity seen on recent chest x-ray represents calcified pleural plaque.  The calcifications were also seen on previous chest x-rays dating back to  2/11/2013. It may have been slightly more prominently seen on the current chest  x-ray due to slight enlargement of the soft tissue component of the over the  past several years and perhaps also due to  technical factors, such as x-ray  exposure factors and differences in positioning of patient.  This may be related to asbestos exposure if applicable.    2. No other significant findings.    Read and electronically signed by: Abhinav Parsons MD on 2/15/2019 9:05 AM CST    Impression/Plan    Problem List Items Addressed This Visit        Other    Abnormal CXR  - has been stable since at least 2007  - no further workup needed      Asbestos exposure/pleural plaques  - probably minimal  - should get yearly CXR  - RTC 1 year                Humberto Gipson MD

## 2022-03-14 NOTE — PROGRESS NOTES
Subjective:       Patient ID: Bernardo Oliveira is a 86 y.o. male.    Chief Complaint: Hypertension, Hyperlipidemia, Gastroesophageal Reflux, Anxiety, Fatigue, and Hearing Loss    Patient is 86-year-old he is as usual accompanied with his wife takes care of his medical in executive needs.    Recent medical care has been reviewed and patient had seen Dr. Humberto choi MD pulmonology for stable nodules in the lung seen in 2018. Calcified pleural plaques were noted which were seen in chest x-rays dating back to 2013 also.  No further workup was indicated at that point.  Mild burden for asbestosis was concerned.    1. Essential hypertension   2. Mixed dyslipidemia   3. Gastroesophageal reflux disease without esophagitis   4. Sensorineural hearing loss (SNHL) of both ears   5. Anxiety disorder, unspecified type   6. Other fatigue     Overall patient is doing okay.  His blood pressures tend to be elevated at 140s to 140 5s at home as per wife statement.  She attributes anxiety for raising his blood pressures in office.  (White coat hypertension).    He continues on metoprolol succinate 25 mg and valsartan 160 mg. Xanax is on those occasions when his blood pressure goes above 170-180.  I am not sure as to why anxiety appears on those occasions.    He has a stable use of benzodiazepines with 2 prescriptions lasting for 6 months or so.    He continues with pantoprazole for reflux symptoms and atorvastatin for cholesterol.    Some of the patient seems to be now interested in knowing his blood group and blood type.  I have advised him about the lack coverage this test typically by insurance as a screening measure.  Like to still get the test done.    Hypertension  This is a chronic problem. The current episode started more than 1 year ago. The problem has been waxing and waning since onset. The problem is uncontrolled. Associated symptoms include anxiety and malaise/fatigue. Pertinent negatives include no chest pain,  headaches, neck pain or shortness of breath. Risk factors for coronary artery disease include sedentary lifestyle and male gender. Past treatments include angiotensin blockers and beta blockers. The current treatment provides mild improvement. Compliance problems include psychosocial issues.    Hyperlipidemia  This is a chronic problem. The current episode started more than 1 year ago. The problem is controlled. He has no history of obesity. Pertinent negatives include no chest pain or shortness of breath. Current antihyperlipidemic treatment includes statins. The current treatment provides moderate improvement of lipids. Compliance problems include psychosocial issues.  Risk factors for coronary artery disease include a sedentary lifestyle, hypertension and dyslipidemia.   Gastroesophageal Reflux  He complains of heartburn. He reports no abdominal pain, no chest pain or no choking. This is a chronic problem. The problem has been gradually improving. Associated symptoms include fatigue. Risk factors include lack of exercise. He has tried a PPI for the symptoms. The treatment provided moderate relief.   Anxiety  Presents for follow-up visit. Symptoms include decreased concentration, malaise, muscle tension and nervous/anxious behavior. Patient reports no chest pain, dizziness, feeling of choking or shortness of breath. Symptoms occur occasionally.       Fatigue  This is a chronic problem. The current episode started more than 1 year ago. The problem occurs constantly. The problem has been waxing and waning. Associated symptoms include fatigue. Pertinent negatives include no abdominal pain, chest pain, congestion, diaphoresis, fever, headaches, joint swelling or neck pain. He has tried rest for the symptoms. The treatment provided mild relief.       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    Depression     GERD (gastroesophageal reflux disease)      Hyperlipidemia     Hypertension     Vocal cord cancer      Social History     Socioeconomic History    Marital status:      Spouse name: Yaan Oliveira   Occupational History    Occupation: Retired -  on ships   Tobacco Use    Smoking status: Former Smoker     Packs/day: 1.50     Types: Cigarettes     Quit date: 1982     Years since quittin.8    Smokeless tobacco: Never Used   Substance and Sexual Activity    Alcohol use: Not Currently    Drug use: No    Sexual activity: Not Currently     Social Determinants of Health     Financial Resource Strain: Low Risk     Difficulty of Paying Living Expenses: Not very hard   Stress: Stress Concern Present    Feeling of Stress : To some extent     Past Surgical History:   Procedure Laterality Date    CATARACT EXTRACTION, BILATERAL      CHOLECYSTECTOMY      CIRCUMCISION      INGUINAL HERNIA REPAIR Right 2000    INGUINAL HERNIA REPAIR Left     RETINAL DETACHMENT SURGERY      THROAT SURGERY      X 2 for vocal cord cancer    TONSILLECTOMY      TRANSURETHRAL RESECTION OF PROSTATE      dr cindi ryan - [facility]     History reviewed. No pertinent family history.    Review of Systems   Constitutional: Positive for fatigue and malaise/fatigue. Negative for activity change, diaphoresis, fever and unexpected weight change.   HENT: Negative for congestion and drooling.    Eyes: Negative for discharge and redness.   Respiratory: Negative for apnea, choking, chest tightness and shortness of breath.    Cardiovascular: Negative for chest pain.   Gastrointestinal: Positive for heartburn. Negative for abdominal distention, abdominal pain and anal bleeding.   Endocrine: Negative for cold intolerance, polydipsia and polyuria.   Genitourinary: Negative for genital sores, penile discharge and urgency.   Musculoskeletal: Negative for joint swelling, neck pain and neck stiffness.   Neurological: Negative for dizziness, seizures and headaches.  "  Hematological: Negative for adenopathy. Does not bruise/bleed easily.   Psychiatric/Behavioral: Positive for decreased concentration. Negative for agitation and behavioral problems. The patient is nervous/anxious.          Objective:      Blood pressure (!) 159/65, pulse 67, height 5' 8" (1.727 m), weight 73 kg (161 lb). Body mass index is 24.48 kg/m².  Physical Exam  Vitals and nursing note reviewed.   Constitutional:       General: He is not in acute distress.     Appearance: He is well-developed. He is not ill-appearing or diaphoretic.      Comments: BMI 24.48   HENT:      Head: Atraumatic.      Right Ear: Decreased hearing noted. Tympanic membrane is scarred.      Left Ear: Decreased hearing noted. Tympanic membrane is scarred.      Ears:      Comments: No obstruction is noted in the ear canal.  Scarred tympanic membranes are noted more on the right side as compared to the left side.     Mouth/Throat:      Pharynx: No oropharyngeal exudate.   Eyes:      Conjunctiva/sclera: Conjunctivae normal.   Neck:      Thyroid: No thyromegaly.      Vascular: No JVD.      Trachea: No tracheal deviation.   Cardiovascular:      Rate and Rhythm: Normal rate and regular rhythm.      Heart sounds: Normal heart sounds.   Pulmonary:      Effort: Pulmonary effort is normal. No respiratory distress.      Breath sounds: Normal breath sounds. No wheezing or rales.   Abdominal:      General: Bowel sounds are normal. There is no distension.      Palpations: Abdomen is soft.      Tenderness: There is no abdominal tenderness.   Musculoskeletal:      Cervical back: Neck supple.      Right lower leg: No edema.      Left lower leg: No edema.   Lymphadenopathy:      Cervical: No cervical adenopathy.   Skin:     General: Skin is warm and dry.      Comments: Senile freckles.    Neurological:      Mental Status: He is alert. Mental status is at baseline.      Motor: Tremor present.      Comments: Mild tremors- pt denies   Psychiatric:         " Mood and Affect: Mood is anxious.         Speech: Speech is not rapid and pressured or delayed.           Assessment:       1. Essential hypertension    2. Mixed dyslipidemia    3. Gastroesophageal reflux disease without esophagitis    4. Sensorineural hearing loss (SNHL) of both ears    5. Anxiety disorder, unspecified type    6. Other fatigue    7. Screening for prostate cancer    8. Encounter for general health examination           No visits with results within 3 Month(s) from this visit.   Latest known visit with results is:   Lab Visit on 10/15/2021   Component Date Value Ref Range Status    Sodium 10/15/2021 142  136 - 145 mmol/L Final    Potassium 10/15/2021 4.1  3.5 - 5.1 mmol/L Final    Chloride 10/15/2021 109  95 - 110 mmol/L Final    CO2 10/15/2021 25  23 - 29 mmol/L Final    Glucose 10/15/2021 94  70 - 110 mg/dL Final    BUN 10/15/2021 18  8 - 23 mg/dL Final    Creatinine 10/15/2021 1.2  0.5 - 1.4 mg/dL Final    Calcium 10/15/2021 9.0  8.7 - 10.5 mg/dL Final    Total Protein 10/15/2021 7.1  6.0 - 8.4 g/dL Final    Albumin 10/15/2021 4.0  3.5 - 5.2 g/dL Final    Total Bilirubin 10/15/2021 1.1 (A) 0.1 - 1.0 mg/dL Final    Alkaline Phosphatase 10/15/2021 58  55 - 135 U/L Final    AST 10/15/2021 21  10 - 40 U/L Final    ALT 10/15/2021 28  10 - 44 U/L Final    Anion Gap 10/15/2021 8  8 - 16 mmol/L Final    eGFR if African American 10/15/2021 >60.0  >60 mL/min/1.73 m^2 Final    eGFR if non African American 10/15/2021 54.4 (A) >60 mL/min/1.73 m^2 Final    WBC 10/15/2021 6.07  3.90 - 12.70 K/uL Final    RBC 10/15/2021 4.88  4.60 - 6.20 M/uL Final    Hemoglobin 10/15/2021 14.2  14.0 - 18.0 g/dL Final    Hematocrit 10/15/2021 43.0  40.0 - 54.0 % Final    MCV 10/15/2021 88  82 - 98 fL Final    MCH 10/15/2021 29.1  27.0 - 31.0 pg Final    MCHC 10/15/2021 33.0  32.0 - 36.0 g/dL Final    RDW 10/15/2021 13.8  11.5 - 14.5 % Final    Platelets 10/15/2021 171  150 - 450 K/uL Final    MPV  10/15/2021 9.9  9.2 - 12.9 fL Final    Immature Granulocytes 10/15/2021 0.5  0.0 - 0.5 % Final    Gran # (ANC) 10/15/2021 2.9  1.8 - 7.7 K/uL Final    Immature Grans (Abs) 10/15/2021 0.03  0.00 - 0.04 K/uL Final    Lymph # 10/15/2021 2.5  1.0 - 4.8 K/uL Final    Mono # 10/15/2021 0.5  0.3 - 1.0 K/uL Final    Eos # 10/15/2021 0.1  0.0 - 0.5 K/uL Final    Baso # 10/15/2021 0.07  0.00 - 0.20 K/uL Final    nRBC 10/15/2021 0  0 /100 WBC Final    Gran % 10/15/2021 47.5  38.0 - 73.0 % Final    Lymph % 10/15/2021 41.7  18.0 - 48.0 % Final    Mono % 10/15/2021 7.6  4.0 - 15.0 % Final    Eosinophil % 10/15/2021 1.5  0.0 - 8.0 % Final    Basophil % 10/15/2021 1.2  0.0 - 1.9 % Final    Differential Method 10/15/2021 Automated   Final         Plan:           Essential hypertension  Comments:  Blood pressures are typically elevated office which wife attributes to white coat hypertension.  At home it is 140-145 systolic as per wife.  Orders:  -     Cancel: Comprehensive Metabolic Panel; Future; Expected date: 08/29/2022  -     Microalbumin/Creatinine Ratio, Urine; Future; Expected date: 08/29/2022    Mixed dyslipidemia  Comments:  Continue statin medications at current dosage.  Orders:  -     Cancel: Lipid Panel; Future; Expected date: 08/29/2022    Gastroesophageal reflux disease without esophagitis  Comments:  Continue with reflux precautions.  Stable need for pantoprazole.  Consider cutting down.    Sensorineural hearing loss (SNHL) of both ears  Comments:  Patient able to hear normal communication with out background noise and with direct face-to-face instead of mask.    Anxiety disorder, unspecified type  Comments:  Intermittent use of alprazolam or Xanax.  Stable dependency.  Orders:  -     Discontinue: ALPRAZolam (XANAX) 0.25 MG tablet; Take 1 pill rarely as needed for anxiety and elevated blood pressure.  Not to exceed more than 2 pills in a week.  Dispense: 22 tablet; Refill: 1  -     ALPRAZolam (XANAX) 0.25  MG tablet; Take 1 tablet (0.25 mg total) by mouth nightly as needed for Anxiety (Take it as needed and not to exceed more than 1 pill a day as needed for anxiety and blood pressure elevation).  Dispense: 22 tablet; Refill: 1    Other fatigue  Comments:  Multifactorial reasons for fatigue.  Check CBC next visit.  Orders:  -     Cancel: CBC Auto Differential; Future; Expected date: 08/29/2022    Screening for prostate cancer    Encounter for general health examination  -     ABO/Rh; Future; Expected date: 03/15/2022      Patient's chronic medical issues have been reviewed.    Blood pressures are somewhat elevated.  I did indicate perhaps increasing the metoprolol or valsartan 2 and elevated does.  At this point wife declines stating that she is completely satisfied with his blood pressures at home and the blood pressures are typically elevated in office.    His issues of anxiety which leads to blood pressure elevations at home also have been noted for which he occasionally and intermittently uses alprazolam with stable dependency.    This has not affected him significantly as far as his cognition, balance and gait has been concerned.    Couple of prescriptions have lasted for about 6 months or so.    Preventive care issues and fall precautions again discussed.    Patient is interested in getting a blood group and typing possibly secondary to media coverage on risk for COVID-19.  Typically this test is not covered by insurance and I have explained to the patient.  Nevertheless they would like to go ahead and do this test.    Labs for follow-up also have been discussed.    His fatigue is multifactorial as a result of aging and multiple other comorbidities.  Please try to conserve and retain energy.    His recent follow-up with Pulmonary also has been noted with no major changes in differences.    Continue with COVID precautions.    Continue with fall precautions.    Follow-up in 6 months.  Earlier if needed.    Spent  shady 30 minutes with patient which involved review of pts medical conditions, labs, medications and with 50% of time face-to-face discussion about medical problems, management and any applicable changes.              Current Outpatient Medications:     atorvastatin (LIPITOR) 40 MG tablet, TAKE 1 TABLET EVERY DAY, Disp: 90 tablet, Rfl: 3    metoprolol succinate (TOPROL-XL) 25 MG 24 hr tablet, TAKE 1 TABLET EVERY DAY, Disp: 90 tablet, Rfl: 1    pantoprazole (PROTONIX) 40 MG tablet, TAKE 1 TABLET EVERY MORNING, Disp: 90 tablet, Rfl: 1    valsartan (DIOVAN) 160 MG tablet, Take 1 tablet (160 mg total) by mouth once daily., Disp: 90 tablet, Rfl: 3    ALPRAZolam (XANAX) 0.25 MG tablet, Take 1 tablet (0.25 mg total) by mouth nightly as needed for Anxiety (Take it as needed and not to exceed more than 1 pill a day as needed for anxiety and blood pressure elevation)., Disp: 22 tablet, Rfl: 1

## 2022-03-15 ENCOUNTER — LAB VISIT (OUTPATIENT)
Dept: LAB | Facility: HOSPITAL | Age: 87
End: 2022-03-15
Attending: INTERNAL MEDICINE
Payer: MEDICARE

## 2022-03-15 ENCOUNTER — OFFICE VISIT (OUTPATIENT)
Dept: FAMILY MEDICINE | Facility: CLINIC | Age: 87
End: 2022-03-15
Payer: MEDICARE

## 2022-03-15 VITALS
HEART RATE: 67 BPM | BODY MASS INDEX: 24.4 KG/M2 | DIASTOLIC BLOOD PRESSURE: 65 MMHG | WEIGHT: 161 LBS | SYSTOLIC BLOOD PRESSURE: 159 MMHG | HEIGHT: 68 IN

## 2022-03-15 DIAGNOSIS — Z12.5 SCREENING FOR PROSTATE CANCER: ICD-10-CM

## 2022-03-15 DIAGNOSIS — I10 ESSENTIAL HYPERTENSION: ICD-10-CM

## 2022-03-15 DIAGNOSIS — E78.2 MIXED DYSLIPIDEMIA: ICD-10-CM

## 2022-03-15 DIAGNOSIS — Z00.00 ENCOUNTER FOR GENERAL HEALTH EXAMINATION: ICD-10-CM

## 2022-03-15 DIAGNOSIS — Z00.00 ROUTINE GENERAL MEDICAL EXAMINATION AT A HEALTH CARE FACILITY: Primary | ICD-10-CM

## 2022-03-15 DIAGNOSIS — H90.3 SENSORINEURAL HEARING LOSS (SNHL) OF BOTH EARS: Chronic | ICD-10-CM

## 2022-03-15 DIAGNOSIS — R53.83 OTHER FATIGUE: Chronic | ICD-10-CM

## 2022-03-15 DIAGNOSIS — I10 ESSENTIAL HYPERTENSION: Primary | Chronic | ICD-10-CM

## 2022-03-15 DIAGNOSIS — R53.83 OTHER FATIGUE: ICD-10-CM

## 2022-03-15 DIAGNOSIS — F41.9 ANXIETY DISORDER, UNSPECIFIED TYPE: Chronic | ICD-10-CM

## 2022-03-15 DIAGNOSIS — K21.9 GASTROESOPHAGEAL REFLUX DISEASE WITHOUT ESOPHAGITIS: Chronic | ICD-10-CM

## 2022-03-15 DIAGNOSIS — E78.2 MIXED DYSLIPIDEMIA: Chronic | ICD-10-CM

## 2022-03-15 LAB — ABO + RH BLD: NORMAL

## 2022-03-15 PROCEDURE — 86901 BLOOD TYPING SEROLOGIC RH(D): CPT | Performed by: INTERNAL MEDICINE

## 2022-03-15 PROCEDURE — 99213 OFFICE O/P EST LOW 20 MIN: CPT | Performed by: INTERNAL MEDICINE

## 2022-03-15 PROCEDURE — 99214 PR OFFICE/OUTPT VISIT, EST, LEVL IV, 30-39 MIN: ICD-10-PCS | Mod: S$PBB,,, | Performed by: INTERNAL MEDICINE

## 2022-03-15 PROCEDURE — 99214 OFFICE O/P EST MOD 30 MIN: CPT | Mod: S$PBB,,, | Performed by: INTERNAL MEDICINE

## 2022-03-15 PROCEDURE — 36415 COLL VENOUS BLD VENIPUNCTURE: CPT | Performed by: INTERNAL MEDICINE

## 2022-03-15 RX ORDER — ALPRAZOLAM 0.25 MG/1
0.25 TABLET ORAL NIGHTLY PRN
Qty: 22 TABLET | Refills: 1 | Status: SHIPPED | OUTPATIENT
Start: 2022-03-15 | End: 2022-06-29 | Stop reason: SDUPTHER

## 2022-03-15 RX ORDER — ALPRAZOLAM 0.25 MG/1
TABLET ORAL
Qty: 22 TABLET | Refills: 1 | Status: SHIPPED | OUTPATIENT
Start: 2022-03-15 | End: 2022-03-15 | Stop reason: CLARIF

## 2022-03-16 ENCOUNTER — TELEPHONE (OUTPATIENT)
Dept: FAMILY MEDICINE | Facility: CLINIC | Age: 87
End: 2022-03-16
Payer: MEDICARE

## 2022-03-16 NOTE — TELEPHONE ENCOUNTER
----- Message from Beny Fay MD sent at 3/15/2022 10:37 PM CDT -----  Please notify the patient that his blood group is A positive.  If he asks-that if this blood group is more prone to COVID-19 advisie him that I am not aware of this at this point.

## 2022-06-29 ENCOUNTER — OFFICE VISIT (OUTPATIENT)
Dept: FAMILY MEDICINE | Facility: CLINIC | Age: 87
End: 2022-06-29
Payer: MEDICARE

## 2022-06-29 VITALS
HEART RATE: 74 BPM | SYSTOLIC BLOOD PRESSURE: 132 MMHG | BODY MASS INDEX: 24.25 KG/M2 | WEIGHT: 160 LBS | DIASTOLIC BLOOD PRESSURE: 78 MMHG | HEIGHT: 68 IN

## 2022-06-29 DIAGNOSIS — E78.2 MIXED DYSLIPIDEMIA: Chronic | ICD-10-CM

## 2022-06-29 DIAGNOSIS — F41.9 ANXIETY DISORDER, UNSPECIFIED TYPE: Chronic | ICD-10-CM

## 2022-06-29 DIAGNOSIS — I10 ESSENTIAL HYPERTENSION: Primary | Chronic | ICD-10-CM

## 2022-06-29 PROCEDURE — 99213 OFFICE O/P EST LOW 20 MIN: CPT | Mod: S$GLB,,, | Performed by: INTERNAL MEDICINE

## 2022-06-29 PROCEDURE — 1126F PR PAIN SEVERITY QUANTIFIED, NO PAIN PRESENT: ICD-10-PCS | Mod: CPTII,S$GLB,, | Performed by: INTERNAL MEDICINE

## 2022-06-29 PROCEDURE — 1160F RVW MEDS BY RX/DR IN RCRD: CPT | Mod: CPTII,S$GLB,, | Performed by: INTERNAL MEDICINE

## 2022-06-29 PROCEDURE — 1101F PR PT FALLS ASSESS DOC 0-1 FALLS W/OUT INJ PAST YR: ICD-10-PCS | Mod: CPTII,S$GLB,, | Performed by: INTERNAL MEDICINE

## 2022-06-29 PROCEDURE — 1159F MED LIST DOCD IN RCRD: CPT | Mod: CPTII,S$GLB,, | Performed by: INTERNAL MEDICINE

## 2022-06-29 PROCEDURE — 3288F PR FALLS RISK ASSESSMENT DOCUMENTED: ICD-10-PCS | Mod: CPTII,S$GLB,, | Performed by: INTERNAL MEDICINE

## 2022-06-29 PROCEDURE — 1126F AMNT PAIN NOTED NONE PRSNT: CPT | Mod: CPTII,S$GLB,, | Performed by: INTERNAL MEDICINE

## 2022-06-29 PROCEDURE — 3288F FALL RISK ASSESSMENT DOCD: CPT | Mod: CPTII,S$GLB,, | Performed by: INTERNAL MEDICINE

## 2022-06-29 PROCEDURE — 1159F PR MEDICATION LIST DOCUMENTED IN MEDICAL RECORD: ICD-10-PCS | Mod: CPTII,S$GLB,, | Performed by: INTERNAL MEDICINE

## 2022-06-29 PROCEDURE — 1101F PT FALLS ASSESS-DOCD LE1/YR: CPT | Mod: CPTII,S$GLB,, | Performed by: INTERNAL MEDICINE

## 2022-06-29 PROCEDURE — 99213 PR OFFICE/OUTPT VISIT, EST, LEVL III, 20-29 MIN: ICD-10-PCS | Mod: S$GLB,,, | Performed by: INTERNAL MEDICINE

## 2022-06-29 PROCEDURE — 1160F PR REVIEW ALL MEDS BY PRESCRIBER/CLIN PHARMACIST DOCUMENTED: ICD-10-PCS | Mod: CPTII,S$GLB,, | Performed by: INTERNAL MEDICINE

## 2022-06-29 RX ORDER — ALPRAZOLAM 0.25 MG/1
0.25 TABLET ORAL NIGHTLY PRN
Qty: 22 TABLET | Refills: 0 | Status: SHIPPED | OUTPATIENT
Start: 2022-06-29 | End: 2022-09-08 | Stop reason: SDUPTHER

## 2022-06-29 NOTE — PROGRESS NOTES
Subjective:       Patient ID: Bernardo Oliveira is a 86 y.o. male.    Chief Complaint: Anxiety and Hypertension    Patient comes for follow-up.  This is a premature follow-up.  He is running short of his medication and you prescription will not be given unless until 4 months are over.    He has chronic anxiety and whenever he is anxious, his blood pressure goes up.  Wife gets worried and she gives him a Xanax.  My understanding was that probably will not get more than 2 Xanax pills a week.  However wife has been telling me that his anxiety has increased and he usually needs 3 pills a week.  He is running short of his medications at this point.    Blood pressures as usual are elevated in the office but wife states that when the weather is good, his blood pressures are pristine.  When the weather changes his blood pressure goes up.    He is taking his other medications.  Reflux symptoms are stable.    Anxiety  Presents for follow-up visit. Symptoms include decreased concentration, depressed mood, excessive worry, insomnia, nervous/anxious behavior and obsessions. Patient reports no chest pain, dizziness or shortness of breath. Symptoms occur most days. The quality of sleep is good.       Hypertension  This is a chronic problem. The current episode started more than 1 year ago. The problem has been waxing and waning since onset. The problem is controlled. Associated symptoms include anxiety. Pertinent negatives include no chest pain, headaches or shortness of breath. Risk factors for coronary artery disease include sedentary lifestyle, male gender and dyslipidemia.   Hyperlipidemia  This is a chronic problem. The current episode started more than 1 year ago. The problem is controlled. He has no history of obesity. Pertinent negatives include no chest pain or shortness of breath. Current antihyperlipidemic treatment includes statins. The current treatment provides moderate improvement of lipids. Compliance problems  include psychosocial issues.  Risk factors for coronary artery disease include a sedentary lifestyle, male sex and dyslipidemia.       Past Medical History:   Diagnosis Date    Anxiety     Asbestos-induced pleural plaque 2020    Depression     GERD (gastroesophageal reflux disease)     Hyperlipidemia     Hypertension     Vocal cord cancer      Social History     Socioeconomic History    Marital status:      Spouse name: Yana Oliveira   Occupational History    Occupation: Retired -  on [employer]   Tobacco Use    Smoking status: Former Smoker     Packs/day: 1.50     Types: Cigarettes     Quit date: 1982     Years since quittin.1    Smokeless tobacco: Never Used   Substance and Sexual Activity    Alcohol use: Not Currently    Drug use: No    Sexual activity: Not Currently     Past Surgical History:   Procedure Laterality Date    CATARACT EXTRACTION, BILATERAL      CHOLECYSTECTOMY      CIRCUMCISION      INGUINAL HERNIA REPAIR Right 2000    INGUINAL HERNIA REPAIR Left     RETINAL DETACHMENT SURGERY      THROAT SURGERY      X 2 for vocal cord cancer    TONSILLECTOMY      TRANSURETHRAL RESECTION OF PROSTATE      dr cindi ryan - [facility]     History reviewed. No pertinent family history.    Review of Systems   Constitutional: Positive for fatigue. Negative for activity change, diaphoresis, fever and unexpected weight change.   HENT: Negative for congestion and drooling.    Eyes: Negative for discharge and redness.   Respiratory: Negative for apnea, choking, chest tightness and shortness of breath.    Cardiovascular: Negative for chest pain.   Gastrointestinal: Negative for abdominal distention, abdominal pain and anal bleeding.   Endocrine: Negative for cold intolerance, polydipsia and polyuria.   Musculoskeletal: Negative for neck stiffness.   Neurological: Negative for dizziness, seizures and headaches.   Psychiatric/Behavioral: Positive for decreased  "concentration. Negative for agitation and behavioral problems. The patient is nervous/anxious and has insomnia.          Objective:      Blood pressure 132/78, pulse 74, height 5' 8" (1.727 m), weight 72.6 kg (160 lb). Body mass index is 24.33 kg/m².  Physical Exam  Vitals and nursing note reviewed.   Constitutional:       General: He is not in acute distress.     Appearance: He is well-developed. He is not ill-appearing or diaphoretic.      Comments: BMI 24.33   HENT:      Head: Atraumatic.      Right Ear: Decreased hearing noted. Tympanic membrane is scarred.      Left Ear: Decreased hearing noted. Tympanic membrane is scarred.      Ears:      Comments: No obstruction is noted in the ear canal.  Scarred tympanic membranes are noted more on the right side as compared to the left side.     Mouth/Throat:      Pharynx: No oropharyngeal exudate.   Eyes:      Conjunctiva/sclera: Conjunctivae normal.   Neck:      Thyroid: No thyromegaly.      Vascular: No JVD.      Trachea: No tracheal deviation.   Cardiovascular:      Rate and Rhythm: Normal rate and regular rhythm.      Heart sounds: Murmur heard.    Systolic murmur is present with a grade of 2/6.  Pulmonary:      Effort: Pulmonary effort is normal. No respiratory distress.      Breath sounds: Normal breath sounds. No wheezing or rales.   Abdominal:      General: Bowel sounds are normal. There is no distension.      Palpations: Abdomen is soft.      Tenderness: There is no abdominal tenderness.   Musculoskeletal:      Cervical back: Neck supple.      Right lower leg: No edema.      Left lower leg: No edema.   Lymphadenopathy:      Cervical: No cervical adenopathy.   Skin:     General: Skin is warm and dry.      Comments: Senile freckles.    Neurological:      Mental Status: He is alert. Mental status is at baseline.      Motor: Tremor present.      Comments: Mild tremors- pt denies   Psychiatric:         Mood and Affect: Mood is anxious.         Speech: Speech is not " rapid and pressured or delayed.      Comments: Patient is oriented to the day and date and year.  He is not aware of the name of his medications.    He could recall the last 4 presidents.  Prior to that he needed some cues.  Wife stated that his memory is as sharp as a tack.           Assessment:       1. Essential hypertension    2. Mixed dyslipidemia    3. Anxiety disorder, unspecified type           No visits with results within 3 Month(s) from this visit.   Latest known visit with results is:   Lab Visit on 03/15/2022   Component Date Value Ref Range Status    Group & Rh 03/15/2022 A POS   Final         Plan:           Essential hypertension  Comments:  Blood pressures tend to be elevated in office.  At home they seem to be okay.    Mixed dyslipidemia  Comments:  Patient is taking his cholesterol medication.  Review labs at next follow-up in 3-4 months time.    Anxiety disorder, unspecified type  Comments:  Intermittent use of alprazolam or Xanax.  Stable dependency.  Orders:  -     ALPRAZolam (XANAX) 0.25 MG tablet; Take 1 tablet (0.25 mg total) by mouth nightly as needed for Anxiety (Take it as needed and not to exceed more than 1 pill a day as needed for anxiety and blood pressure elevation).  Dispense: 22 tablet; Refill: 0      Patient for alprazolam has been sent to the pharmacy.    Minimize the use of this medication.  Consider other medications to control his blood pressures whenever they are high.  At this point probably he has stable dependency.    Blood pressures tend to be elevated in the office but okay at home.  Generally he is fairly functional and loves to grow vegetables in his yd and he has a good collection of tomatoes, a plants, okra and bell peppers yucca, casava and [place].      Current Outpatient Medications:     atorvastatin (LIPITOR) 40 MG tablet, TAKE 1 TABLET EVERY DAY, Disp: 90 tablet, Rfl: 3    metoprolol succinate (TOPROL-XL) 25 MG 24 hr tablet, TAKE 1 TABLET EVERY DAY, Disp: 90  tablet, Rfl: 1    pantoprazole (PROTONIX) 40 MG tablet, TAKE 1 TABLET EVERY MORNING, Disp: 90 tablet, Rfl: 1    valsartan (DIOVAN) 160 MG tablet, TAKE 1 TABLET EVERY DAY, Disp: 90 tablet, Rfl: 3    ALPRAZolam (XANAX) 0.25 MG tablet, Take 1 tablet (0.25 mg total) by mouth nightly as needed for Anxiety (Take it as needed and not to exceed more than 1 pill a day as needed for anxiety and blood pressure elevation)., Disp: 22 tablet, Rfl: 0

## 2022-08-25 ENCOUNTER — OFFICE VISIT (OUTPATIENT)
Dept: FAMILY MEDICINE | Facility: CLINIC | Age: 87
End: 2022-08-25
Payer: MEDICARE

## 2022-08-25 VITALS
HEART RATE: 71 BPM | HEIGHT: 68 IN | OXYGEN SATURATION: 100 % | DIASTOLIC BLOOD PRESSURE: 67 MMHG | WEIGHT: 159.69 LBS | BODY MASS INDEX: 24.2 KG/M2 | SYSTOLIC BLOOD PRESSURE: 166 MMHG

## 2022-08-25 DIAGNOSIS — E78.2 MIXED DYSLIPIDEMIA: ICD-10-CM

## 2022-08-25 DIAGNOSIS — M54.2 CERVICALGIA: Primary | ICD-10-CM

## 2022-08-25 DIAGNOSIS — G44.52 NEW DAILY PERSISTENT HEADACHE: ICD-10-CM

## 2022-08-25 PROCEDURE — 99213 PR OFFICE/OUTPT VISIT, EST, LEVL III, 20-29 MIN: ICD-10-PCS | Mod: S$GLB,,, | Performed by: INTERNAL MEDICINE

## 2022-08-25 PROCEDURE — 3288F PR FALLS RISK ASSESSMENT DOCUMENTED: ICD-10-PCS | Mod: CPTII,S$GLB,, | Performed by: INTERNAL MEDICINE

## 2022-08-25 PROCEDURE — 1125F PR PAIN SEVERITY QUANTIFIED, PAIN PRESENT: ICD-10-PCS | Mod: CPTII,S$GLB,, | Performed by: INTERNAL MEDICINE

## 2022-08-25 PROCEDURE — 3288F FALL RISK ASSESSMENT DOCD: CPT | Mod: CPTII,S$GLB,, | Performed by: INTERNAL MEDICINE

## 2022-08-25 PROCEDURE — 99213 OFFICE O/P EST LOW 20 MIN: CPT | Mod: S$GLB,,, | Performed by: INTERNAL MEDICINE

## 2022-08-25 PROCEDURE — 1125F AMNT PAIN NOTED PAIN PRSNT: CPT | Mod: CPTII,S$GLB,, | Performed by: INTERNAL MEDICINE

## 2022-08-25 PROCEDURE — 1101F PT FALLS ASSESS-DOCD LE1/YR: CPT | Mod: CPTII,S$GLB,, | Performed by: INTERNAL MEDICINE

## 2022-08-25 PROCEDURE — 1101F PR PT FALLS ASSESS DOC 0-1 FALLS W/OUT INJ PAST YR: ICD-10-PCS | Mod: CPTII,S$GLB,, | Performed by: INTERNAL MEDICINE

## 2022-08-25 PROCEDURE — 1159F MED LIST DOCD IN RCRD: CPT | Mod: CPTII,S$GLB,, | Performed by: INTERNAL MEDICINE

## 2022-08-25 PROCEDURE — 1160F RVW MEDS BY RX/DR IN RCRD: CPT | Mod: CPTII,S$GLB,, | Performed by: INTERNAL MEDICINE

## 2022-08-25 PROCEDURE — 1159F PR MEDICATION LIST DOCUMENTED IN MEDICAL RECORD: ICD-10-PCS | Mod: CPTII,S$GLB,, | Performed by: INTERNAL MEDICINE

## 2022-08-25 PROCEDURE — 1160F PR REVIEW ALL MEDS BY PRESCRIBER/CLIN PHARMACIST DOCUMENTED: ICD-10-PCS | Mod: CPTII,S$GLB,, | Performed by: INTERNAL MEDICINE

## 2022-08-25 NOTE — PROGRESS NOTES
Subjective:       Patient ID: Bernardo Oliveira is a 86 y.o. male.    Chief Complaint: Shoulder Pain, Neck Pain, and Headache    This 86-year-old gentleman who comes for evaluation today.  He is as usual accompanied.  He complains of a new onset of neck pain and shoulder pain bilaterally accompanied with headaches.  This has started for about 3 weeks.  It does not seem that this problem existed before.    There is no history of trauma or injury.  There is no history of whiplash or fall.    As to what could be causing this pain, his wife thinks that whenever he has sinuses he gets inflammation and his body hurts.  Patient himself does not think that it has anything to do with sinuses.    His other medical problems including hyperlipidemia, hypertension, gastroesophageal reflux and chronic anxiety have been noted.    Blood pressures at home seems to be 150 which apparently as per the patient is normal.    Shoulder Pain   The pain is present in the neck and back. This is a new problem. The current episode started 1 to 4 weeks ago. There has been no history of extremity trauma. The problem occurs constantly. The problem has been unchanged. Pertinent negatives include no fever or headaches.   Neck Pain   This is a new problem. The current episode started 1 to 4 weeks ago. The problem occurs constantly. The problem has been unchanged. The pain is moderate. The symptoms are aggravated by twisting and bending. Pertinent negatives include no chest pain, fever or headaches.   Headache   This is a new problem. The current episode started 1 to 4 weeks ago. The problem occurs constantly. The problem has been waxing and waning. The pain is located in the bilateral region. The quality of the pain is described as aching. Associated symptoms include back pain and neck pain. Pertinent negatives include no abdominal pain, dizziness, eye redness, fever or seizures. The treatment provided mild relief.       Past Medical History:    Diagnosis Date    Anxiety     Asbestos-induced pleural plaque 2020    Depression     GERD (gastroesophageal reflux disease)     Hyperlipidemia     Hypertension     Vocal cord cancer      Social History     Socioeconomic History    Marital status:      Spouse name: Yana Oliveira   Occupational History    Occupation: Retired -  on [employer]   Tobacco Use    Smoking status: Former Smoker     Packs/day: 1.50     Types: Cigarettes     Quit date: 1982     Years since quittin.2    Smokeless tobacco: Never Used   Substance and Sexual Activity    Alcohol use: Not Currently    Drug use: No    Sexual activity: Not Currently     Past Surgical History:   Procedure Laterality Date    CATARACT EXTRACTION, BILATERAL      CHOLECYSTECTOMY      CIRCUMCISION      INGUINAL HERNIA REPAIR Right 2000    INGUINAL HERNIA REPAIR Left     RETINAL DETACHMENT SURGERY      THROAT SURGERY      X 2 for vocal cord cancer    TONSILLECTOMY      TRANSURETHRAL RESECTION OF PROSTATE      dr cindi ryan - [facility]     History reviewed. No pertinent family history.    Review of Systems   Constitutional: Positive for fatigue. Negative for activity change, diaphoresis, fever and unexpected weight change.   HENT: Negative for congestion and drooling.    Eyes: Negative for discharge, redness and visual disturbance.   Respiratory: Negative for apnea, choking, chest tightness and shortness of breath.    Cardiovascular: Negative for chest pain.        Hypertension uncontrolled   Gastrointestinal: Negative for abdominal distention, abdominal pain and anal bleeding.        Gastroesophageal reflux on pantoprazole   Endocrine: Negative for cold intolerance, polydipsia and polyuria.   Musculoskeletal: Positive for back pain and neck pain. Negative for neck stiffness.   Neurological: Negative for dizziness, seizures and headaches.   Psychiatric/Behavioral: Positive for decreased concentration. Negative for  "agitation and behavioral problems. The patient is nervous/anxious.          Objective:      Blood pressure (!) 166/67, pulse 71, height 5' 8" (1.727 m), weight 72.4 kg (159 lb 11.2 oz), SpO2 100 %. Body mass index is 24.28 kg/m².  Physical Exam  Vitals and nursing note reviewed.   Constitutional:       General: He is not in acute distress.     Appearance: He is well-developed. He is not ill-appearing or diaphoretic.      Comments: BMI 24.28   HENT:      Head: Atraumatic.      Right Ear: Decreased hearing noted. Tympanic membrane is scarred.      Left Ear: Decreased hearing noted. Tympanic membrane is scarred.      Ears:      Comments: No obstruction is noted in the ear canal.  Scarred tympanic membranes are noted more on the right side as compared to the left side.     Mouth/Throat:      Pharynx: No oropharyngeal exudate.   Eyes:      Conjunctiva/sclera: Conjunctivae normal.   Neck:      Thyroid: No thyromegaly.      Vascular: No JVD.      Trachea: No tracheal deviation.        Comments: Mild stiffness in the neck but range of motion is complete.  Thus she did areas in the above picture points out to the area where he feels the stiffness and pain.  No scalp tenderness noted.  Cardiovascular:      Rate and Rhythm: Normal rate and regular rhythm.      Heart sounds: Murmur heard.    Systolic murmur is present with a grade of 2/6.  Pulmonary:      Effort: Pulmonary effort is normal.      Breath sounds: Normal breath sounds.   Abdominal:      General: There is no distension.      Palpations: Abdomen is soft.      Tenderness: There is no abdominal tenderness.   Musculoskeletal:      Cervical back: Neck supple.      Right lower leg: No edema.      Left lower leg: No edema.   Lymphadenopathy:      Cervical: No cervical adenopathy.   Skin:     General: Skin is warm and dry.      Comments: Senile freckles.    Neurological:      Mental Status: He is alert. Mental status is at baseline.      Motor: Tremor present.      " Comments: Mild tremors- pt denies   Psychiatric:         Mood and Affect: Mood is anxious.         Speech: Speech is delayed.           Assessment:       1. Cervicalgia    2. New daily persistent headache    3. Mixed dyslipidemia           No visits with results within 3 Month(s) from this visit.   Latest known visit with results is:   Lab Visit on 03/15/2022   Component Date Value Ref Range Status    Group & Rh 03/15/2022 A POS   Final         Plan:           Cervicalgia  -     X-Ray Cervical Spine Complete 5 view; Future; Expected date: 08/25/2022  -     Comprehensive Metabolic Panel; Future; Expected date: 08/25/2022  -     CBC Auto Differential; Future; Expected date: 08/25/2022  -     Sedimentation rate; Future; Expected date: 08/25/2022    New daily persistent headache  -     Comprehensive Metabolic Panel; Future; Expected date: 08/25/2022  -     CBC Auto Differential; Future; Expected date: 08/25/2022  -     Sedimentation rate; Future; Expected date: 08/25/2022    Mixed dyslipidemia  -     Lipid Panel; Future; Expected date: 08/25/2022      The cause of new onset of pain which is moderate in nature in the neck associated with headaches is unclear.  He could have cervical radiculopathy and somewhat something probably aggravated it.    Will check a sed rate, CBC and CMP and also neck x-rays to be sure that we are not missing anything.  Sed rate is to be sure that we are not missing with any autoimmune disorder or conditions like giant cell arteritis or temporal arteritis.    He has not had a lipid panel done in ears and will check a lipid panel also.    Recommend warm showers and massage.    In 2 weeks to review situation     Current Outpatient Medications:     ALPRAZolam (XANAX) 0.25 MG tablet, Take 1 tablet (0.25 mg total) by mouth nightly as needed for Anxiety (Take it as needed and not to exceed more than 1 pill a day as needed for anxiety and blood pressure elevation)., Disp: 22 tablet, Rfl: 0     atorvastatin (LIPITOR) 40 MG tablet, TAKE 1 TABLET EVERY DAY, Disp: 90 tablet, Rfl: 3    metoprolol succinate (TOPROL-XL) 25 MG 24 hr tablet, TAKE 1 TABLET EVERY DAY, Disp: 90 tablet, Rfl: 1    pantoprazole (PROTONIX) 40 MG tablet, TAKE 1 TABLET EVERY MORNING, Disp: 90 tablet, Rfl: 1    valsartan (DIOVAN) 160 MG tablet, TAKE 1 TABLET EVERY DAY, Disp: 90 tablet, Rfl: 3

## 2022-08-26 ENCOUNTER — HOSPITAL ENCOUNTER (OUTPATIENT)
Dept: RADIOLOGY | Facility: HOSPITAL | Age: 87
Discharge: HOME | End: 2022-08-26
Attending: INTERNAL MEDICINE
Payer: MEDICARE

## 2022-08-26 DIAGNOSIS — M54.2 CERVICALGIA: ICD-10-CM

## 2022-08-26 PROCEDURE — 72050 X-RAY EXAM NECK SPINE 4/5VWS: CPT | Mod: TC,PO

## 2022-08-28 NOTE — PROGRESS NOTES
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  Check with him how he is doing with the neck pain and headaches.  He may need a referral to physical therapy or pain management if the pain continues.

## 2022-08-29 ENCOUNTER — TELEPHONE (OUTPATIENT)
Dept: FAMILY MEDICINE | Facility: CLINIC | Age: 87
End: 2022-08-29

## 2022-08-29 NOTE — TELEPHONE ENCOUNTER
----- Message from Beny Fay MD sent at 8/27/2022  7:34 PM CDT -----  Notify patient's wife that his blood reports including chemistry, kidney test, liver test, blood count and cholesterol reports were all good.  Test for inflammation call sedimentation rate which was done in view of his headaches was also within normal range.  Basically the problem seems to be in his neck which shows arthritis and probably there is some inflammation going on.  He may need some physical therapy, heat pads and some massage.

## 2022-08-29 NOTE — TELEPHONE ENCOUNTER
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  Check with him how he is doing with the neck pain and headaches.  He may need a referral to physical therapy or pain management if the pain continues.

## 2022-09-08 ENCOUNTER — TELEPHONE (OUTPATIENT)
Dept: FAMILY MEDICINE | Facility: CLINIC | Age: 87
End: 2022-09-08

## 2022-09-08 ENCOUNTER — OFFICE VISIT (OUTPATIENT)
Dept: FAMILY MEDICINE | Facility: CLINIC | Age: 87
End: 2022-09-08
Payer: MEDICARE

## 2022-09-08 VITALS
BODY MASS INDEX: 23.95 KG/M2 | WEIGHT: 158 LBS | SYSTOLIC BLOOD PRESSURE: 138 MMHG | DIASTOLIC BLOOD PRESSURE: 79 MMHG | HEART RATE: 74 BPM | HEIGHT: 68 IN

## 2022-09-08 DIAGNOSIS — I10 ESSENTIAL HYPERTENSION: Chronic | ICD-10-CM

## 2022-09-08 DIAGNOSIS — E78.2 MIXED DYSLIPIDEMIA: Chronic | ICD-10-CM

## 2022-09-08 DIAGNOSIS — M25.512 CHRONIC LEFT SHOULDER PAIN: Primary | ICD-10-CM

## 2022-09-08 DIAGNOSIS — F41.9 ANXIETY DISORDER, UNSPECIFIED TYPE: Chronic | ICD-10-CM

## 2022-09-08 DIAGNOSIS — G89.29 CHRONIC LEFT SHOULDER PAIN: Primary | ICD-10-CM

## 2022-09-08 PROCEDURE — 1159F MED LIST DOCD IN RCRD: CPT | Mod: CPTII,S$GLB,, | Performed by: INTERNAL MEDICINE

## 2022-09-08 PROCEDURE — 1125F PR PAIN SEVERITY QUANTIFIED, PAIN PRESENT: ICD-10-PCS | Mod: CPTII,S$GLB,, | Performed by: INTERNAL MEDICINE

## 2022-09-08 PROCEDURE — 3288F PR FALLS RISK ASSESSMENT DOCUMENTED: ICD-10-PCS | Mod: CPTII,S$GLB,, | Performed by: INTERNAL MEDICINE

## 2022-09-08 PROCEDURE — 1160F PR REVIEW ALL MEDS BY PRESCRIBER/CLIN PHARMACIST DOCUMENTED: ICD-10-PCS | Mod: CPTII,S$GLB,, | Performed by: INTERNAL MEDICINE

## 2022-09-08 PROCEDURE — 1101F PT FALLS ASSESS-DOCD LE1/YR: CPT | Mod: CPTII,S$GLB,, | Performed by: INTERNAL MEDICINE

## 2022-09-08 PROCEDURE — 99213 PR OFFICE/OUTPT VISIT, EST, LEVL III, 20-29 MIN: ICD-10-PCS | Mod: S$GLB,,, | Performed by: INTERNAL MEDICINE

## 2022-09-08 PROCEDURE — 1125F AMNT PAIN NOTED PAIN PRSNT: CPT | Mod: CPTII,S$GLB,, | Performed by: INTERNAL MEDICINE

## 2022-09-08 PROCEDURE — 3288F FALL RISK ASSESSMENT DOCD: CPT | Mod: CPTII,S$GLB,, | Performed by: INTERNAL MEDICINE

## 2022-09-08 PROCEDURE — 1101F PR PT FALLS ASSESS DOC 0-1 FALLS W/OUT INJ PAST YR: ICD-10-PCS | Mod: CPTII,S$GLB,, | Performed by: INTERNAL MEDICINE

## 2022-09-08 PROCEDURE — 99213 OFFICE O/P EST LOW 20 MIN: CPT | Mod: S$GLB,,, | Performed by: INTERNAL MEDICINE

## 2022-09-08 PROCEDURE — 1159F PR MEDICATION LIST DOCUMENTED IN MEDICAL RECORD: ICD-10-PCS | Mod: CPTII,S$GLB,, | Performed by: INTERNAL MEDICINE

## 2022-09-08 PROCEDURE — 1160F RVW MEDS BY RX/DR IN RCRD: CPT | Mod: CPTII,S$GLB,, | Performed by: INTERNAL MEDICINE

## 2022-09-08 RX ORDER — NABUMETONE 500 MG/1
500 TABLET, FILM COATED ORAL 2 TIMES DAILY PRN
Qty: 20 TABLET | Refills: 1 | Status: SHIPPED | OUTPATIENT
Start: 2022-09-08

## 2022-09-08 RX ORDER — ALPRAZOLAM 0.25 MG/1
0.25 TABLET ORAL NIGHTLY PRN
Qty: 10 TABLET | Refills: 0 | Status: SHIPPED | OUTPATIENT
Start: 2022-09-30 | End: 2022-11-23 | Stop reason: SDUPTHER

## 2022-09-08 RX ORDER — ALPRAZOLAM 0.25 MG/1
0.25 TABLET ORAL NIGHTLY PRN
Qty: 22 TABLET | Refills: 0 | Status: CANCELLED | OUTPATIENT
Start: 2022-09-08

## 2022-09-08 NOTE — PROGRESS NOTES
Subjective:       Patient ID: Bernardo Oliveira is a 87 y.o. male.    Chief Complaint: Follow-up, Neck Pain, Anxiety, and left scapular pain    Patient is 87-year-old gentleman who comes for follow-up.  Medical issues are as below:-    Today he complains of a left shoulder pain which starts from the medial aspect of the scapula and shoots up to the left arm and then it should radiates down the left arm.  He does not recall any injury or trauma.  He does not have any rash suggestive of shingles.    His neck pain seems to have resolved.    Cervical spine x-ray had shown mild arthritis which might be consistent and expected given his age of 87.    Blood pressure controls are always difficult in office and he states and his wife confirms that blood pressure at home is pristine and good.  It is only when he comes to the office his blood pressure rises up.    Issues of chronic stress and anxiety continues with steady and stable dependency on Xanax or alprazolam.    His recent lipid panel is good with a good HDL level.  General chemistry and blood counts are also good.    He continues on pantoprazole 40 mg.    Difficult to be aggressive with his blood pressure with stable readings at home for fear of low blood pressure readings and falls and injuries.      Neck Pain   This is a recurrent problem. The current episode started more than 1 month ago. The problem occurs constantly. The problem has been gradually improving. The pain is associated with nothing. The pain is present in the midline. The pain is at a severity of 2/10. The pain is mild. The symptoms are aggravated by twisting and bending. Pertinent negatives include no chest pain, fever or headaches.   Anxiety  Presents for follow-up visit. Symptoms include decreased concentration, depressed mood, excessive worry and nervous/anxious behavior. Patient reports no chest pain, dizziness or shortness of breath. Symptoms occur most days. The quality of sleep is  non-restorative.       Shoulder Pain   This is a new problem. The current episode started in the past 7 days. There has been no history of extremity trauma. The problem occurs constantly. The problem has been unchanged. Pertinent negatives include no fever or headaches. The treatment provided no relief. There is no history of diabetes.     Past Medical History:   Diagnosis Date    Anxiety     Asbestos-induced pleural plaque 2020    Depression     GERD (gastroesophageal reflux disease)     Hyperlipidemia     Hypertension     Vocal cord cancer      Social History     Socioeconomic History    Marital status:      Spouse name: Yana Oliveira   Occupational History    Occupation: Retired -  on [employer]   Tobacco Use    Smoking status: Former     Packs/day: 1.50     Types: Cigarettes     Quit date: 1982     Years since quittin.3    Smokeless tobacco: Never   Substance and Sexual Activity    Alcohol use: Not Currently    Drug use: No    Sexual activity: Not Currently     Past Surgical History:   Procedure Laterality Date    CATARACT EXTRACTION, BILATERAL      CHOLECYSTECTOMY      CIRCUMCISION      INGUINAL HERNIA REPAIR Right 2000    INGUINAL HERNIA REPAIR Left     RETINAL DETACHMENT SURGERY      THROAT SURGERY      X 2 for vocal cord cancer    TONSILLECTOMY      TRANSURETHRAL RESECTION OF PROSTATE      dr cindi ryan - [facility]     History reviewed. No pertinent family history.    Review of Systems   Constitutional:  Positive for fatigue. Negative for activity change, diaphoresis, fever and unexpected weight change.   HENT:  Negative for congestion and drooling.    Eyes:  Negative for discharge, redness and visual disturbance.   Respiratory:  Negative for apnea, choking, chest tightness and shortness of breath.    Cardiovascular:  Negative for chest pain.        Hypertension uncontrolled   Gastrointestinal:  Negative for abdominal distention, abdominal pain and anal bleeding.         "Gastroesophageal reflux on pantoprazole   Endocrine: Negative for cold intolerance, polydipsia and polyuria.   Musculoskeletal:  Positive for back pain and neck pain. Negative for neck stiffness.        Left neck pain   Neurological:  Negative for dizziness, seizures and headaches.   Psychiatric/Behavioral:  Positive for decreased concentration. Negative for agitation and behavioral problems. The patient is nervous/anxious.        Objective:      Blood pressure 138/79, pulse 74, height 5' 8" (1.727 m), weight 71.7 kg (158 lb). Body mass index is 24.02 kg/m².  Physical Exam  Vitals and nursing note reviewed.   Constitutional:       General: He is not in acute distress.     Appearance: He is well-developed. He is not ill-appearing or diaphoretic.      Comments: BMI 24.02   HENT:      Head: Atraumatic.      Right Ear: Decreased hearing noted. Tympanic membrane is scarred.      Left Ear: Decreased hearing noted. Tympanic membrane is scarred.      Ears:      Comments: No obstruction is noted in the ear canal.  Scarred tympanic membranes are noted more on the right side as compared to the left side.     Mouth/Throat:      Pharynx: No oropharyngeal exudate.   Eyes:      Conjunctiva/sclera: Conjunctivae normal.   Neck:      Thyroid: No thyromegaly.      Vascular: No JVD.      Trachea: No tracheal deviation.        Comments: Mild stiffness in the neck but range of motion is complete.  Thus she did areas in the above picture points out to the area where he feels the stiffness and pain.  No scalp tenderness noted.  Cardiovascular:      Rate and Rhythm: Normal rate and regular rhythm.      Heart sounds: Murmur heard.   Systolic murmur is present with a grade of 2/6.   Pulmonary:      Effort: Pulmonary effort is normal.      Breath sounds: Normal breath sounds.   Abdominal:      General: There is no distension.      Palpations: Abdomen is soft.      Tenderness: There is no abdominal tenderness.   Musculoskeletal:      Cervical " back: Neck supple. No swelling or deformity.      Thoracic back: No swelling, deformity or tenderness.        Back:       Right lower leg: No edema.      Left lower leg: No edema.      Comments: There is no localized swelling in the thoracic spine.  No rash of shingles.  No palpation tenderness.   Lymphadenopathy:      Cervical: No cervical adenopathy.   Skin:     General: Skin is warm and dry.      Comments: Senile freckles.    Neurological:      Mental Status: He is alert. Mental status is at baseline.      Motor: Tremor present.      Comments: Mild tremors- pt denies   Psychiatric:         Mood and Affect: Mood is anxious.         Speech: Speech is delayed.         Assessment:       1. Chronic left shoulder pain    2. Anxiety disorder, unspecified type    3. Essential hypertension    4. Mixed dyslipidemia    5. Anxiety disorder, unspecified type             Lab Visit on 08/26/2022   Component Date Value Ref Range Status    Sodium 08/26/2022 141  136 - 145 mmol/L Final    Potassium 08/26/2022 3.8  3.5 - 5.1 mmol/L Final    Chloride 08/26/2022 105  95 - 110 mmol/L Final    CO2 08/26/2022 29  23 - 29 mmol/L Final    Glucose 08/26/2022 80  70 - 110 mg/dL Final    BUN 08/26/2022 14  8 - 23 mg/dL Final    Creatinine 08/26/2022 1.0  0.5 - 1.4 mg/dL Final    Calcium 08/26/2022 8.8  8.7 - 10.5 mg/dL Final    Total Protein 08/26/2022 7.0  6.0 - 8.4 g/dL Final    Albumin 08/26/2022 4.0  3.5 - 5.2 g/dL Final    Total Bilirubin 08/26/2022 1.2 (H)  0.1 - 1.0 mg/dL Final    Alkaline Phosphatase 08/26/2022 56  55 - 135 U/L Final    AST 08/26/2022 25  10 - 40 U/L Final    ALT 08/26/2022 39  10 - 44 U/L Final    Anion Gap 08/26/2022 7 (L)  8 - 16 mmol/L Final    eGFR 08/26/2022 >60.0  >60 mL/min/1.73 m^2 Final    WBC 08/26/2022 5.63  3.90 - 12.70 K/uL Final    RBC 08/26/2022 4.84  4.60 - 6.20 M/uL Final    Hemoglobin 08/26/2022 14.0  14.0 - 18.0 g/dL Final    Hematocrit 08/26/2022 43.1  40.0 - 54.0 % Final    MCV 08/26/2022 89  82  - 98 fL Final    MCH 08/26/2022 28.9  27.0 - 31.0 pg Final    MCHC 08/26/2022 32.5  32.0 - 36.0 g/dL Final    RDW 08/26/2022 13.4  11.5 - 14.5 % Final    Platelets 08/26/2022 155  150 - 450 K/uL Final    MPV 08/26/2022 11.0  9.2 - 12.9 fL Final    Immature Granulocytes 08/26/2022 0.2  0.0 - 0.5 % Final    Gran # (ANC) 08/26/2022 2.8  1.8 - 7.7 K/uL Final    Immature Grans (Abs) 08/26/2022 0.01  0.00 - 0.04 K/uL Final    Lymph # 08/26/2022 2.2  1.0 - 4.8 K/uL Final    Mono # 08/26/2022 0.5  0.3 - 1.0 K/uL Final    Eos # 08/26/2022 0.1  0.0 - 0.5 K/uL Final    Baso # 08/26/2022 0.05  0.00 - 0.20 K/uL Final    nRBC 08/26/2022 0  0 /100 WBC Final    Gran % 08/26/2022 49.4  38.0 - 73.0 % Final    Lymph % 08/26/2022 38.5  18.0 - 48.0 % Final    Mono % 08/26/2022 8.7  4.0 - 15.0 % Final    Eosinophil % 08/26/2022 2.3  0.0 - 8.0 % Final    Basophil % 08/26/2022 0.9  0.0 - 1.9 % Final    Differential Method 08/26/2022 Automated   Final    Sed Rate 08/26/2022 1  0 - 10 mm/Hr Final    Cholesterol 08/26/2022 165  120 - 199 mg/dL Final    Triglycerides 08/26/2022 62  30 - 150 mg/dL Final    HDL 08/26/2022 95 (H)  40 - 75 mg/dL Final    LDL Cholesterol 08/26/2022 57.6 (L)  63.0 - 159.0 mg/dL Final    HDL/Cholesterol Ratio 08/26/2022 57.6 (H)  20.0 - 50.0 % Final    Total Cholesterol/HDL Ratio 08/26/2022 1.7 (L)  2.0 - 5.0 Final    Non-HDL Cholesterol 08/26/2022 70  mg/dL Final     FINDINGS:     PA and lateral chest with comparison chest x-ray February 17, 2021 show normal cardiomediastinal silhouette.  Nodular-like opacities in the right midlung are unchanged from previous exam. There is biapical pleural-parenchymal thickening. No consolidations. Pulmonary vasculature is normal. No acute osseous abnormality.     IMPRESSION:     1.  Nodular-like opacities in the right midlung are unchanged from prior exam. These are unchanged when compared with study from 12/20/2020 and are considered benign.  2.  Unchanged biapical  pleural-parenchymal thickening.  3.  No other lung opacities observed.     Electronically signed by:  Dillon Loco URIARTE  2/16/2022 10:04 AM [facility] Workstation: [id number]           Specimen Collected: 02/16/22 09:29 Last Resulted: 02/16/22 10:04           EXAMINATION:  XR CERVICAL SPINE COMPLETE 5 VIEW     CLINICAL HISTORY:  Cervicalgia     FINDINGS:  The cervical spine is in satisfactory alignment.  The vertebral bodies are of normal height.  There is moderate disc space narrowing and facet hypertrophy from C3 through T1.  The facet joints are aligned.  Prevertebral soft tissues are normal.  The odontoid process is intact and lateral masses of C1 are symmetrical.  The lung apices are clear.  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  No rash.  Could be referred pain from cervical spine.  Will treat with Relafen and see how he  Orders:  -     nabumetone (RELAFEN) 500 MG tablet; Take 1 tablet (500 mg total) by mouth 2 (two) times daily as needed for Pain (shoulder pain).  Dispense: 20 tablet; Refill: 1    Anxiety disorder, unspecified type  Comments:  Intermittent use of alprazolam or Xanax.  Stable dependency.  I had given him a prescription for 22 pills of alprazolam.  He states he takes it only once a week  Orders:  -     ALPRAZolam (XANAX) 0.25 MG tablet; Take 1 tablet (0.25 mg total) by mouth nightly as needed for Anxiety (Take it as needed and not to exceed more than 1 pill a day as needed for anxiety and blood pressure  elevation).  Dispense: 10 tablet; Refill: 0    Essential hypertension  Comments:  Blood pressures are typically elevated in the office.  At home they seem to be okay.  I will trust their home readings at this point.    Mixed dyslipidemia  Comments:  Cholesterol level is good.    Anxiety disorder, unspecified type  Comments:  Intermittent use of alprazolam or Xanax.  Stable dependency.  Orders:  -     ALPRAZolam (XANAX) 0.25 MG tablet; Take 1 tablet (0.25 mg total) by mouth nightly as needed for Anxiety (Take it as needed and not to exceed more than 1 pill a day as needed for anxiety and blood pressure elevation).  Dispense: 10 tablet; Refill: 0    Difficult to make out fluctuating pains each visit.  Difficult to justify major workup if the pain is not going to last too long.  Previous neck x-ray did not show any significant changes except for mild arthritis in his pain is better.    I will treat the shoulder pain conservatively without ordering MRI or cervical spine or thoracic spine or CT scan at this point.  Will treat with Relafen.  He is already on PPI protection with pantoprazole.    Wife will help him us arch the neck and shoulder area which she is already doing.  Warm compresses and range of motion exercise.    He continues with chronic anxiety which raises blood pressures at home.  Difficult to push too many medications for fear of side effects now.  Will leave it alone.  Wife states that his blood pressures at home are doing good.    Follow-up in a month or 2. Please consider taking the flu shot once available.      Review of Xanax use. 8 out off 22 pills remaining and hence he sa used about 14 pills in 2 months  or average 7 pills per month or 2 pills per week average. While it is not too much but they seem to be loosing track as patient states that he hardly uses 1 pill in 10 days    Limited Rx will be sent for 10 pills with a post fill date       e.  Current Outpatient Medications:     atorvastatin  (LIPITOR) 40 MG tablet, TAKE 1 TABLET EVERY DAY, Disp: 90 tablet, Rfl: 3    metoprolol succinate (TOPROL-XL) 25 MG 24 hr tablet, TAKE 1 TABLET EVERY DAY, Disp: 90 tablet, Rfl: 1    pantoprazole (PROTONIX) 40 MG tablet, TAKE 1 TABLET EVERY MORNING, Disp: 90 tablet, Rfl: 1    valsartan (DIOVAN) 160 MG tablet, TAKE 1 TABLET EVERY DAY, Disp: 90 tablet, Rfl: 3    [START ON 9/30/2022] ALPRAZolam (XANAX) 0.25 MG tablet, Take 1 tablet (0.25 mg total) by mouth nightly as needed for Anxiety (Take it as needed and not to exceed more than 1 pill a day as needed for anxiety and blood pressure elevation)., Disp: 10 tablet, Rfl: 0    nabumetone (RELAFEN) 500 MG tablet, Take 1 tablet (500 mg total) by mouth 2 (two) times daily as needed for Pain (shoulder pain)., Disp: 20 tablet, Rfl: 1

## 2022-10-11 ENCOUNTER — OFFICE VISIT (OUTPATIENT)
Dept: UROLOGY | Facility: CLINIC | Age: 87
End: 2022-10-11
Payer: MEDICARE

## 2022-10-11 VITALS
HEIGHT: 68 IN | SYSTOLIC BLOOD PRESSURE: 175 MMHG | DIASTOLIC BLOOD PRESSURE: 77 MMHG | BODY MASS INDEX: 23.95 KG/M2 | HEART RATE: 73 BPM | WEIGHT: 158 LBS | RESPIRATION RATE: 18 BRPM

## 2022-10-11 DIAGNOSIS — K21.9 GASTROESOPHAGEAL REFLUX DISEASE WITHOUT ESOPHAGITIS: ICD-10-CM

## 2022-10-11 DIAGNOSIS — E78.2 MIXED DYSLIPIDEMIA: ICD-10-CM

## 2022-10-11 DIAGNOSIS — N40.0 BENIGN PROSTATIC HYPERPLASIA WITHOUT LOWER URINARY TRACT SYMPTOMS: Primary | ICD-10-CM

## 2022-10-11 DIAGNOSIS — I10 ESSENTIAL HYPERTENSION: ICD-10-CM

## 2022-10-11 PROCEDURE — 99214 PR OFFICE/OUTPT VISIT, EST, LEVL IV, 30-39 MIN: ICD-10-PCS | Mod: S$GLB,,, | Performed by: STUDENT IN AN ORGANIZED HEALTH CARE EDUCATION/TRAINING PROGRAM

## 2022-10-11 PROCEDURE — 1101F PT FALLS ASSESS-DOCD LE1/YR: CPT | Mod: CPTII,S$GLB,, | Performed by: STUDENT IN AN ORGANIZED HEALTH CARE EDUCATION/TRAINING PROGRAM

## 2022-10-11 PROCEDURE — 1126F AMNT PAIN NOTED NONE PRSNT: CPT | Mod: CPTII,S$GLB,, | Performed by: STUDENT IN AN ORGANIZED HEALTH CARE EDUCATION/TRAINING PROGRAM

## 2022-10-11 PROCEDURE — 99999 PR PBB SHADOW E&M-EST. PATIENT-LVL III: CPT | Mod: PBBFAC,,, | Performed by: STUDENT IN AN ORGANIZED HEALTH CARE EDUCATION/TRAINING PROGRAM

## 2022-10-11 PROCEDURE — 99214 OFFICE O/P EST MOD 30 MIN: CPT | Mod: S$GLB,,, | Performed by: STUDENT IN AN ORGANIZED HEALTH CARE EDUCATION/TRAINING PROGRAM

## 2022-10-11 PROCEDURE — 1159F PR MEDICATION LIST DOCUMENTED IN MEDICAL RECORD: ICD-10-PCS | Mod: CPTII,S$GLB,, | Performed by: STUDENT IN AN ORGANIZED HEALTH CARE EDUCATION/TRAINING PROGRAM

## 2022-10-11 PROCEDURE — 1159F MED LIST DOCD IN RCRD: CPT | Mod: CPTII,S$GLB,, | Performed by: STUDENT IN AN ORGANIZED HEALTH CARE EDUCATION/TRAINING PROGRAM

## 2022-10-11 PROCEDURE — 3288F FALL RISK ASSESSMENT DOCD: CPT | Mod: CPTII,S$GLB,, | Performed by: STUDENT IN AN ORGANIZED HEALTH CARE EDUCATION/TRAINING PROGRAM

## 2022-10-11 PROCEDURE — 1126F PR PAIN SEVERITY QUANTIFIED, NO PAIN PRESENT: ICD-10-PCS | Mod: CPTII,S$GLB,, | Performed by: STUDENT IN AN ORGANIZED HEALTH CARE EDUCATION/TRAINING PROGRAM

## 2022-10-11 PROCEDURE — 3288F PR FALLS RISK ASSESSMENT DOCUMENTED: ICD-10-PCS | Mod: CPTII,S$GLB,, | Performed by: STUDENT IN AN ORGANIZED HEALTH CARE EDUCATION/TRAINING PROGRAM

## 2022-10-11 PROCEDURE — 1101F PR PT FALLS ASSESS DOC 0-1 FALLS W/OUT INJ PAST YR: ICD-10-PCS | Mod: CPTII,S$GLB,, | Performed by: STUDENT IN AN ORGANIZED HEALTH CARE EDUCATION/TRAINING PROGRAM

## 2022-10-11 PROCEDURE — 99999 PR PBB SHADOW E&M-EST. PATIENT-LVL III: ICD-10-PCS | Mod: PBBFAC,,, | Performed by: STUDENT IN AN ORGANIZED HEALTH CARE EDUCATION/TRAINING PROGRAM

## 2022-10-26 ENCOUNTER — TELEPHONE (OUTPATIENT)
Dept: UROLOGY | Facility: CLINIC | Age: 87
End: 2022-10-26
Payer: MEDICARE

## 2022-10-26 DIAGNOSIS — Z12.5 SCREENING FOR PROSTATE CANCER: ICD-10-CM

## 2022-10-26 DIAGNOSIS — N40.0 BENIGN PROSTATIC HYPERPLASIA WITHOUT LOWER URINARY TRACT SYMPTOMS: Primary | ICD-10-CM

## 2022-10-26 NOTE — TELEPHONE ENCOUNTER
Returned call and spoke to patient's wife. She states the patient has lots of anxiety and is used to having PSA done yearly. Wants to know can the doctor please order testing. Informed will give the message to provider about PSA, but for the other lab work she will need to contact his PCP for those orders, she verbally understood.

## 2022-10-26 NOTE — TELEPHONE ENCOUNTER
----- Message from Christie Ghosh sent at 10/26/2022  1:00 PM CDT -----  Regarding: orders  Patient would a order for PSA TEST, POTASSIUM AND MAGNESIUM, AND CHLES. pATIENT WIFE STATES SHE TRIED CALLING.    PLS CALL WIFE [phone]      THANKS

## 2022-10-27 NOTE — TELEPHONE ENCOUNTER
Spoke with patient's wife, informed order for PSA has been placed and I can schedule,  appt made, she will inform the patient, she verbally understood.

## 2022-10-31 ENCOUNTER — LAB VISIT (OUTPATIENT)
Dept: LAB | Facility: HOSPITAL | Age: 87
End: 2022-10-31
Attending: STUDENT IN AN ORGANIZED HEALTH CARE EDUCATION/TRAINING PROGRAM
Payer: MEDICARE

## 2022-10-31 ENCOUNTER — TELEPHONE (OUTPATIENT)
Dept: UROLOGY | Facility: CLINIC | Age: 87
End: 2022-10-31
Payer: MEDICARE

## 2022-10-31 DIAGNOSIS — Z12.5 SCREENING FOR PROSTATE CANCER: ICD-10-CM

## 2022-10-31 DIAGNOSIS — N40.0 BENIGN PROSTATIC HYPERPLASIA WITHOUT LOWER URINARY TRACT SYMPTOMS: ICD-10-CM

## 2022-10-31 LAB — COMPLEXED PSA SERPL-MCNC: 1 NG/ML (ref 0–4)

## 2022-10-31 PROCEDURE — 84153 ASSAY OF PSA TOTAL: CPT | Performed by: STUDENT IN AN ORGANIZED HEALTH CARE EDUCATION/TRAINING PROGRAM

## 2022-10-31 PROCEDURE — 36415 COLL VENOUS BLD VENIPUNCTURE: CPT | Performed by: STUDENT IN AN ORGANIZED HEALTH CARE EDUCATION/TRAINING PROGRAM

## 2022-10-31 NOTE — TELEPHONE ENCOUNTER
----- Message from Sadaf Mcknight sent at 10/31/2022  1:49 PM CDT -----  Contact: Wife  Type:  Test Results    Who Called:  Wife     Name of Test (Lab/Mammo/Etc):  lab     Date of Test:  10/31    Ordering Provider:      Where the test was performed:  [facility]     Would the patient rather a call back or a response via [facility]? Call     Best Call Back Number: [phone] (home) [phone] (work)     Additional Information:

## 2022-11-23 ENCOUNTER — OFFICE VISIT (OUTPATIENT)
Dept: FAMILY MEDICINE | Facility: CLINIC | Age: 87
End: 2022-11-23
Payer: MEDICARE

## 2022-11-23 VITALS
HEIGHT: 68 IN | SYSTOLIC BLOOD PRESSURE: 137 MMHG | DIASTOLIC BLOOD PRESSURE: 72 MMHG | HEART RATE: 77 BPM | BODY MASS INDEX: 23.64 KG/M2 | WEIGHT: 156 LBS

## 2022-11-23 DIAGNOSIS — M25.562 CHRONIC PAIN OF LEFT KNEE: ICD-10-CM

## 2022-11-23 DIAGNOSIS — K21.9 GASTROESOPHAGEAL REFLUX DISEASE WITHOUT ESOPHAGITIS: Chronic | ICD-10-CM

## 2022-11-23 DIAGNOSIS — M54.2 CERVICALGIA: Chronic | ICD-10-CM

## 2022-11-23 DIAGNOSIS — G89.29 CHRONIC PAIN OF LEFT KNEE: ICD-10-CM

## 2022-11-23 DIAGNOSIS — G89.29 CHRONIC LEFT SHOULDER PAIN: ICD-10-CM

## 2022-11-23 DIAGNOSIS — E78.2 MIXED DYSLIPIDEMIA: Chronic | ICD-10-CM

## 2022-11-23 DIAGNOSIS — I10 ESSENTIAL HYPERTENSION: Primary | Chronic | ICD-10-CM

## 2022-11-23 DIAGNOSIS — F41.9 ANXIETY DISORDER, UNSPECIFIED TYPE: Chronic | ICD-10-CM

## 2022-11-23 DIAGNOSIS — M25.512 CHRONIC LEFT SHOULDER PAIN: ICD-10-CM

## 2022-11-23 PROCEDURE — 1160F PR REVIEW ALL MEDS BY PRESCRIBER/CLIN PHARMACIST DOCUMENTED: ICD-10-PCS | Mod: CPTII,S$GLB,, | Performed by: INTERNAL MEDICINE

## 2022-11-23 PROCEDURE — 1126F AMNT PAIN NOTED NONE PRSNT: CPT | Mod: CPTII,S$GLB,, | Performed by: INTERNAL MEDICINE

## 2022-11-23 PROCEDURE — 1160F RVW MEDS BY RX/DR IN RCRD: CPT | Mod: CPTII,S$GLB,, | Performed by: INTERNAL MEDICINE

## 2022-11-23 PROCEDURE — 1159F MED LIST DOCD IN RCRD: CPT | Mod: CPTII,S$GLB,, | Performed by: INTERNAL MEDICINE

## 2022-11-23 PROCEDURE — 99213 PR OFFICE/OUTPT VISIT, EST, LEVL III, 20-29 MIN: ICD-10-PCS | Mod: S$GLB,,, | Performed by: INTERNAL MEDICINE

## 2022-11-23 PROCEDURE — 1101F PR PT FALLS ASSESS DOC 0-1 FALLS W/OUT INJ PAST YR: ICD-10-PCS | Mod: CPTII,S$GLB,, | Performed by: INTERNAL MEDICINE

## 2022-11-23 PROCEDURE — 99213 OFFICE O/P EST LOW 20 MIN: CPT | Mod: S$GLB,,, | Performed by: INTERNAL MEDICINE

## 2022-11-23 PROCEDURE — 1159F PR MEDICATION LIST DOCUMENTED IN MEDICAL RECORD: ICD-10-PCS | Mod: CPTII,S$GLB,, | Performed by: INTERNAL MEDICINE

## 2022-11-23 PROCEDURE — 3288F FALL RISK ASSESSMENT DOCD: CPT | Mod: CPTII,S$GLB,, | Performed by: INTERNAL MEDICINE

## 2022-11-23 PROCEDURE — 1101F PT FALLS ASSESS-DOCD LE1/YR: CPT | Mod: CPTII,S$GLB,, | Performed by: INTERNAL MEDICINE

## 2022-11-23 PROCEDURE — 1126F PR PAIN SEVERITY QUANTIFIED, NO PAIN PRESENT: ICD-10-PCS | Mod: CPTII,S$GLB,, | Performed by: INTERNAL MEDICINE

## 2022-11-23 PROCEDURE — 3288F PR FALLS RISK ASSESSMENT DOCUMENTED: ICD-10-PCS | Mod: CPTII,S$GLB,, | Performed by: INTERNAL MEDICINE

## 2022-11-23 RX ORDER — ALPRAZOLAM 0.25 MG/1
0.25 TABLET ORAL NIGHTLY PRN
Qty: 10 TABLET | Refills: 0 | Status: SHIPPED | OUTPATIENT
Start: 2022-11-23 | End: 2022-12-31

## 2023-02-15 ENCOUNTER — HOSPITAL ENCOUNTER (OUTPATIENT)
Dept: RADIOLOGY | Facility: HOSPITAL | Age: 88
Discharge: HOME | End: 2023-02-15
Attending: INTERNAL MEDICINE
Payer: MEDICARE

## 2023-02-15 ENCOUNTER — OFFICE VISIT (OUTPATIENT)
Dept: PULMONOLOGY | Facility: CLINIC | Age: 88
End: 2023-02-15
Payer: MEDICARE

## 2023-02-15 VITALS
HEART RATE: 73 BPM | DIASTOLIC BLOOD PRESSURE: 80 MMHG | OXYGEN SATURATION: 98 % | SYSTOLIC BLOOD PRESSURE: 132 MMHG | BODY MASS INDEX: 25 KG/M2 | WEIGHT: 164.38 LBS

## 2023-02-15 DIAGNOSIS — J92.0 ASBESTOS-INDUCED PLEURAL PLAQUE: ICD-10-CM

## 2023-02-15 DIAGNOSIS — R05.9 COUGH, UNSPECIFIED TYPE: ICD-10-CM

## 2023-02-15 DIAGNOSIS — R93.89 ABNORMAL CXR: Primary | ICD-10-CM

## 2023-02-15 DIAGNOSIS — R93.89 ABNORMAL CXR: ICD-10-CM

## 2023-02-15 PROCEDURE — 1160F RVW MEDS BY RX/DR IN RCRD: CPT | Mod: CPTII,S$GLB,, | Performed by: INTERNAL MEDICINE

## 2023-02-15 PROCEDURE — 3288F PR FALLS RISK ASSESSMENT DOCUMENTED: ICD-10-PCS | Mod: CPTII,S$GLB,, | Performed by: INTERNAL MEDICINE

## 2023-02-15 PROCEDURE — 1101F PT FALLS ASSESS-DOCD LE1/YR: CPT | Mod: CPTII,S$GLB,, | Performed by: INTERNAL MEDICINE

## 2023-02-15 PROCEDURE — 99214 PR OFFICE/OUTPT VISIT, EST, LEVL IV, 30-39 MIN: ICD-10-PCS | Mod: S$GLB,,, | Performed by: INTERNAL MEDICINE

## 2023-02-15 PROCEDURE — 1101F PR PT FALLS ASSESS DOC 0-1 FALLS W/OUT INJ PAST YR: ICD-10-PCS | Mod: CPTII,S$GLB,, | Performed by: INTERNAL MEDICINE

## 2023-02-15 PROCEDURE — 1126F PR PAIN SEVERITY QUANTIFIED, NO PAIN PRESENT: ICD-10-PCS | Mod: CPTII,S$GLB,, | Performed by: INTERNAL MEDICINE

## 2023-02-15 PROCEDURE — 1126F AMNT PAIN NOTED NONE PRSNT: CPT | Mod: CPTII,S$GLB,, | Performed by: INTERNAL MEDICINE

## 2023-02-15 PROCEDURE — 1159F PR MEDICATION LIST DOCUMENTED IN MEDICAL RECORD: ICD-10-PCS | Mod: CPTII,S$GLB,, | Performed by: INTERNAL MEDICINE

## 2023-02-15 PROCEDURE — 3288F FALL RISK ASSESSMENT DOCD: CPT | Mod: CPTII,S$GLB,, | Performed by: INTERNAL MEDICINE

## 2023-02-15 PROCEDURE — 1159F MED LIST DOCD IN RCRD: CPT | Mod: CPTII,S$GLB,, | Performed by: INTERNAL MEDICINE

## 2023-02-15 PROCEDURE — 1160F PR REVIEW ALL MEDS BY PRESCRIBER/CLIN PHARMACIST DOCUMENTED: ICD-10-PCS | Mod: CPTII,S$GLB,, | Performed by: INTERNAL MEDICINE

## 2023-02-15 PROCEDURE — 71046 X-RAY EXAM CHEST 2 VIEWS: CPT | Mod: TC

## 2023-02-15 PROCEDURE — 99214 OFFICE O/P EST MOD 30 MIN: CPT | Mod: S$GLB,,, | Performed by: INTERNAL MEDICINE

## 2023-02-15 RX ORDER — BETAMETHASONE DIPROPIONATE 0.5 MG/G
CREAM TOPICAL
COMMUNITY
Start: 2022-08-22

## 2023-02-15 NOTE — PROGRESS NOTES
Office Visit    Patient Name: Bernardo Oliveira  MRN: [id number]  : 1935      Reason for visit: Abnormal CXR    HPI:     2018 - 83 yo male was in Urgent Care with bronchitis had CXR showig a lung nodule.  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  We reviewed old films in [facility] - CXR in  looks unchanged ad that report states that it is unchanged since  (making this stable for about 11 years).  He has no symptoms.  He did smoke (probably < 20 pack years and quit > 40 years ago).  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    2019 - Here for follow up, stable, no new respiratory complaints.  Has not been in ER or hospitalized.  Had a sinus infection (or possibly the flu) in January - better now.    2020 - Here for follow up, has done well over the last year - no hospitalizations, no new diagnoses.  No respiratory symptoms - he remains active (mowing the lawn).  Did travel to [place] - no known exposure to corona virus.    2021 - Here for follow up, no new respiratory symptoms except for occasional allergy (postnasal drip, throat congestion).  No recent hospitalizations and no new diagnoses.  Patient has no known corona virus exposures and has been practicing social distancing.  We have discussed the virus and precautions and all questions have been answered.  Reports better BP control at home and has had issues with increased BP at MD visit.    2022 - Here for follow up, doing well with no new complaints.  No hospitalizations.  No falls or injuries.  Patient has no known corona virus exposures and has been practicing social distancing.  We have discussed the virus and precautions and all questions have been answered.    2/15/2023 - Here for follow up, doing well and no new complaints.  Has some chornic dry cough as well as some post nasal drip and we discussed this.  No ER  visits or hospitalizations.  He remains active.  Patient has no known corona virus exposures and has been practicing social distancing.  We have discussed the virus and precautions and all questions have been answered.      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    Depression     GERD (gastroesophageal reflux disease)     Hyperlipidemia     Hypertension     Vocal cord cancer        Past Surgical History    Past Surgical History:   Procedure Laterality Date    CATARACT EXTRACTION, BILATERAL      CHOLECYSTECTOMY      CIRCUMCISION      INGUINAL HERNIA REPAIR Right 2000    INGUINAL HERNIA REPAIR Left 1995    RETINAL DETACHMENT SURGERY      THROAT SURGERY      X 2 for vocal cord cancer    TONSILLECTOMY      TRANSURETHRAL RESECTION OF PROSTATE  2004    dr cindi ryan - [facility]       Medications      Current Outpatient Medications:     atorvastatin (LIPITOR) 40 MG tablet, TAKE 1 TABLET EVERY DAY, Disp: 90 tablet, Rfl: 3    augmented betamethasone dipropionate (DIPROLENE-AF) 0.05 % cream, , Disp: , Rfl:     metoprolol succinate (TOPROL-XL) 25 MG 24 hr tablet, TAKE 1 TABLET EVERY DAY, Disp: 90 tablet, Rfl: 1    nabumetone (RELAFEN) 500 MG tablet, Take 1 tablet (500 mg total) by mouth 2 (two) times daily as needed for Pain (shoulder pain)., Disp: 20 tablet, Rfl: 1    pantoprazole (PROTONIX) 40 MG tablet, TAKE 1 TABLET EVERY MORNING, Disp: 90 tablet, Rfl: 1    valsartan (DIOVAN) 160 MG tablet, TAKE 1 TABLET EVERY DAY, Disp: 90 tablet, Rfl: 3    ALPRAZolam (XANAX) 0.25 MG tablet, Take 1 tablet (0.25 mg total) by mouth nightly as needed for Anxiety (Take it as needed and not to exceed more than 1 pill a day as needed for  anxiety and blood pressure elevation)., Disp: 10 tablet, Rfl: 0    Allergies    Review of patient's allergies indicates:   Allergen Reactions    Codeine        SocHx    Social History     Tobacco Use   Smoking Status Former    Packs/day: 1.50    Types: Cigarettes    Quit date: 1982    Years since quittin.7   Smokeless Tobacco Never       Social History     Substance and Sexual Activity   Alcohol Use Not Currently       Drug Use - no  Occupation - retired, worked as  on [employer]  Asbestos exposure - +    Review of Systems  Review of Systems   Constitutional:  Negative for chills, diaphoresis, fever, malaise/fatigue and weight loss.   HENT:  Negative for congestion.    Eyes:  Negative for pain.   Respiratory:  Negative for cough, hemoptysis, sputum production, shortness of breath, wheezing and stridor.    Cardiovascular:  Negative for chest pain, palpitations, orthopnea, claudication, leg swelling and PND.   Gastrointestinal:  Negative for abdominal pain, constipation, diarrhea, heartburn, nausea and vomiting.   Genitourinary:  Negative for dysuria, frequency and urgency.   Musculoskeletal:  Negative for falls and myalgias.   Neurological:  Negative for sensory change, focal weakness and weakness.   Psychiatric/Behavioral:  Negative for depression. The patient is not nervous/anxious.      Physical Exam    Vitals:    02/15/23 0854   BP: 132/80   BP Location: Left arm   Patient Position: Sitting   BP Method: Medium (Manual)   Pulse: 73   SpO2: 98%   Weight: 74.6 kg (164 lb 6.4 oz)       Physical Exam  Vitals and nursing note reviewed.   Constitutional:       General: He is not in acute distress.     Appearance: He is well-developed. He is not diaphoretic.   HENT:      Head: Normocephalic and atraumatic.      Right Ear: External ear normal.      Left Ear: External ear normal.      Nose: Nose normal.   Eyes:      Pupils: Pupils are equal, round, and reactive to light.   Neck:      Thyroid: No  thyromegaly.      Vascular: No JVD.      Trachea: No tracheal deviation.   Cardiovascular:      Rate and Rhythm: Normal rate and regular rhythm.      Heart sounds: Normal heart sounds. No murmur heard.    No friction rub. No gallop.   Pulmonary:      Effort: Pulmonary effort is normal. No respiratory distress.      Breath sounds: Normal breath sounds. No stridor. No wheezing or rales.   Chest:      Chest wall: No tenderness.   Abdominal:      General: Bowel sounds are normal. There is no distension.      Palpations: Abdomen is soft.   Musculoskeletal:         General: No tenderness. Normal range of motion.      Cervical back: Normal range of motion and neck supple.   Lymphadenopathy:      Cervical: No cervical adenopathy.   Neurological:      Mental Status: He is alert and oriented to person, place, and time.      Cranial Nerves: No cranial nerve deficit.      Deep Tendon Reflexes: Reflexes are normal and symmetric.   Psychiatric:         Behavior: Behavior normal.       Labs    Lab Results   Component Value Date    WBC 5.63 08/26/2022    HGB 14.0 08/26/2022    HCT 43.1 08/26/2022     08/26/2022       Sodium   Date Value Ref Range Status   08/26/2022 141 136 - 145 mmol/L Final   02/15/2019 140 134 - 144 mmol/L      Potassium   Date Value Ref Range Status   08/26/2022 3.8 3.5 - 5.1 mmol/L Final     Chloride   Date Value Ref Range Status   08/26/2022 105 95 - 110 mmol/L Final   02/15/2019 105 98 - 110 mmol/L      CO2   Date Value Ref Range Status   08/26/2022 29 23 - 29 mmol/L Final     Glucose   Date Value Ref Range Status   08/26/2022 80 70 - 110 mg/dL Final   02/15/2019 98 70 - 99 mg/dL      BUN   Date Value Ref Range Status   08/26/2022 14 8 - 23 mg/dL Final     Creatinine   Date Value Ref Range Status   08/26/2022 1.0 0.5 - 1.4 mg/dL Final   02/15/2019 0.98 0.60 - 1.40 mg/dL    05/28/2012 0.8 0.2 - 1.4 mg/dl Final     Calcium   Date Value Ref Range Status   08/26/2022 8.8 8.7 - 10.5 mg/dL Final    05/28/2012 9.3 8.6 - 10.2 mg/dl Final     Total Protein   Date Value Ref Range Status   08/26/2022 7.0 6.0 - 8.4 g/dL Final     Albumin   Date Value Ref Range Status   08/26/2022 4.0 3.5 - 5.2 g/dL Final   02/15/2019 3.9 3.1 - 4.7 g/dL      Total Bilirubin   Date Value Ref Range Status   08/26/2022 1.2 (H) 0.1 - 1.0 mg/dL Final     Comment:     For infants and newborns, interpretation of results should be based  on gestational age, weight and in agreement with clinical  observations.    Premature Infant recommended reference ranges:  Up to 24 hours.............<8.0 mg/dL  Up to 48 hours............<12.0 mg/dL  3-5 days..................<15.0 mg/dL  6-29 days.................<15.0 mg/dL       Alkaline Phosphatase   Date Value Ref Range Status   08/26/2022 56 55 - 135 U/L Final   05/28/2012 64 23 - 119 UNIT/L Final     AST   Date Value Ref Range Status   08/26/2022 25 10 - 40 U/L Final   05/28/2012 17 10 - 34 UNIT/L Final     ALT   Date Value Ref Range Status   08/26/2022 39 10 - 44 U/L Final     Anion Gap   Date Value Ref Range Status   08/26/2022 7 (L) 8 - 16 mmol/L Final   05/28/2012 7 5 - 15 meq/L Final       Xrays    CT OF THE CHEST WITHOUT  INTRAVENOUS CONTRAST    CLINICAL INFORMATION: Abnormal chest x-ray.    COMPARISON STUDIES: Chest x-rays dated 2/12/2019, 8/27/2015 and 2/11/2013.    FINDINGS : There are minimal arteriosclerotic changes in the aorta without  aneurysm.    No mediastinal mass are lymphadenopathy noted.    Tracheobronchial tree, heart and pericardium are normal.    There are several small pleural calcified plaques anteriorly in the right upper  lobe, some of which were seen on the chest x-ray and account for the chest  x-ray findings. No distinct mass is identified.    Lungs are clear with no lung nodule, mass, groundglass or airspace opacities.    There is no pleural effusion.    The pleura in the rest of the chest is normal.    No osseous abnormality seen.    There are multiple benign  cortical and peripelvic right renal cyst in the  visualized portion of the right upper kidney.        IMPRESSION:    1. Opacity seen on recent chest x-ray represents calcified pleural plaque.  The calcifications were also seen on previous chest x-rays dating back to  2/11/2013. It may have been slightly more prominently seen on the current chest  x-ray due to slight enlargement of the soft tissue component of the over the  past several years and perhaps also due to technical factors, such as x-ray  exposure factors and differences in positioning of patient.  This may be related to asbestos exposure if applicable.    2. No other significant findings.    Read and electronically signed by: Abhinav Parsons MD on 2/15/2019 9:05 AM CST    Impression/Plan    Problem List Items Addressed This Visit          Other    Abnormal CXR  - has been stable since at least 2007  - no further workup needed      Asbestos exposure/pleural plaques  - probably minimal  - should get yearly CXR  - RTC 1 year                  Humberto Gipson MD

## 2023-06-21 RX ORDER — METOPROLOL SUCCINATE 25 MG/1
TABLET, EXTENDED RELEASE ORAL
Qty: 90 TABLET | Refills: 1 | Status: SHIPPED | OUTPATIENT
Start: 2023-06-21

## 2023-07-19 DIAGNOSIS — I10 ESSENTIAL HYPERTENSION: ICD-10-CM

## 2023-07-24 RX ORDER — VALSARTAN 160 MG/1
TABLET ORAL
Qty: 90 TABLET | Refills: 3 | OUTPATIENT
Start: 2023-07-24

## 2023-08-11 ENCOUNTER — HOSPITAL ENCOUNTER (OUTPATIENT)
Dept: RADIOLOGY | Facility: HOSPITAL | Age: 88
Discharge: HOME | End: 2023-08-11
Attending: NURSE PRACTITIONER
Payer: MEDICARE

## 2023-08-11 DIAGNOSIS — M50.30 DISC DISEASE, DEGENERATIVE, CERVICAL: Primary | ICD-10-CM

## 2023-08-11 DIAGNOSIS — M50.30 DISC DISEASE, DEGENERATIVE, CERVICAL: ICD-10-CM

## 2023-08-11 PROCEDURE — 72050 X-RAY EXAM NECK SPINE 4/5VWS: CPT | Mod: TC

## 2023-08-19 ENCOUNTER — HOSPITAL ENCOUNTER (EMERGENCY)
Facility: HOSPITAL | Age: 88
Discharge: HOME | End: 2023-08-19
Attending: EMERGENCY MEDICINE
Payer: MEDICARE

## 2023-08-19 VITALS
WEIGHT: 160 LBS | HEIGHT: 68 IN | DIASTOLIC BLOOD PRESSURE: 86 MMHG | SYSTOLIC BLOOD PRESSURE: 197 MMHG | RESPIRATION RATE: 19 BRPM | TEMPERATURE: 99 F | OXYGEN SATURATION: 99 % | BODY MASS INDEX: 24.25 KG/M2 | HEART RATE: 61 BPM

## 2023-08-19 DIAGNOSIS — M54.12 CERVICAL RADICULOPATHY: Primary | ICD-10-CM

## 2023-08-19 DIAGNOSIS — R07.9 CHEST PAIN: ICD-10-CM

## 2023-08-19 LAB
ALBUMIN SERPL BCP-MCNC: 3.8 G/DL (ref 3.5–5.2)
ALP SERPL-CCNC: 50 U/L (ref 55–135)
ALT SERPL W/O P-5'-P-CCNC: 28 U/L (ref 10–44)
ANION GAP SERPL CALC-SCNC: 4 MMOL/L (ref 8–16)
AST SERPL-CCNC: 22 U/L (ref 10–40)
BASOPHILS # BLD AUTO: 0.06 K/UL (ref 0–0.2)
BASOPHILS NFR BLD: 1 % (ref 0–1.9)
BILIRUB SERPL-MCNC: 0.8 MG/DL (ref 0.1–1)
BNP SERPL-MCNC: 154 PG/ML (ref 0–99)
BUN SERPL-MCNC: 15 MG/DL (ref 8–23)
CALCIUM SERPL-MCNC: 8.8 MG/DL (ref 8.7–10.5)
CHLORIDE SERPL-SCNC: 108 MMOL/L (ref 95–110)
CO2 SERPL-SCNC: 28 MMOL/L (ref 23–29)
CREAT SERPL-MCNC: 1.1 MG/DL (ref 0.5–1.4)
DIFFERENTIAL METHOD: ABNORMAL
EOSINOPHIL # BLD AUTO: 0.1 K/UL (ref 0–0.5)
EOSINOPHIL NFR BLD: 1.5 % (ref 0–8)
ERYTHROCYTE [DISTWIDTH] IN BLOOD BY AUTOMATED COUNT: 13.8 % (ref 11.5–14.5)
EST. GFR  (NO RACE VARIABLE): >60 ML/MIN/1.73 M^2
GLUCOSE SERPL-MCNC: 74 MG/DL (ref 70–110)
HCT VFR BLD AUTO: 40.4 % (ref 40–54)
HGB BLD-MCNC: 13.2 G/DL (ref 14–18)
IMM GRANULOCYTES # BLD AUTO: 0.01 K/UL (ref 0–0.04)
IMM GRANULOCYTES NFR BLD AUTO: 0.2 % (ref 0–0.5)
LYMPHOCYTES # BLD AUTO: 2 K/UL (ref 1–4.8)
LYMPHOCYTES NFR BLD: 33.3 % (ref 18–48)
MAGNESIUM SERPL-MCNC: 1.9 MG/DL (ref 1.6–2.6)
MCH RBC QN AUTO: 29.5 PG (ref 27–31)
MCHC RBC AUTO-ENTMCNC: 32.7 G/DL (ref 32–36)
MCV RBC AUTO: 90 FL (ref 82–98)
MONOCYTES # BLD AUTO: 0.6 K/UL (ref 0.3–1)
MONOCYTES NFR BLD: 9.7 % (ref 4–15)
NEUTROPHILS # BLD AUTO: 3.2 K/UL (ref 1.8–7.7)
NEUTROPHILS NFR BLD: 54.3 % (ref 38–73)
NRBC BLD-RTO: 0 /100 WBC
PLATELET # BLD AUTO: 123 K/UL (ref 150–450)
PMV BLD AUTO: 10.4 FL (ref 9.2–12.9)
POTASSIUM SERPL-SCNC: 4.3 MMOL/L (ref 3.5–5.1)
PROT SERPL-MCNC: 6.6 G/DL (ref 6–8.4)
RBC # BLD AUTO: 4.48 M/UL (ref 4.6–6.2)
SODIUM SERPL-SCNC: 140 MMOL/L (ref 136–145)
TROPONIN I SERPL HS-MCNC: 3.4 PG/ML (ref 0–14.9)
TROPONIN I SERPL HS-MCNC: 3.9 PG/ML (ref 0–14.9)
WBC # BLD AUTO: 5.88 K/UL (ref 3.9–12.7)

## 2023-08-19 PROCEDURE — 93010 EKG 12-LEAD: ICD-10-PCS | Mod: ,,, | Performed by: INTERNAL MEDICINE

## 2023-08-19 PROCEDURE — 83735 ASSAY OF MAGNESIUM: CPT | Performed by: EMERGENCY MEDICINE

## 2023-08-19 PROCEDURE — 93005 ELECTROCARDIOGRAM TRACING: CPT | Performed by: INTERNAL MEDICINE

## 2023-08-19 PROCEDURE — 84484 ASSAY OF TROPONIN QUANT: CPT | Performed by: EMERGENCY MEDICINE

## 2023-08-19 PROCEDURE — 99285 EMERGENCY DEPT VISIT HI MDM: CPT | Mod: 25

## 2023-08-19 PROCEDURE — 96374 THER/PROPH/DIAG INJ IV PUSH: CPT

## 2023-08-19 PROCEDURE — 80053 COMPREHEN METABOLIC PANEL: CPT | Performed by: EMERGENCY MEDICINE

## 2023-08-19 PROCEDURE — 96361 HYDRATE IV INFUSION ADD-ON: CPT

## 2023-08-19 PROCEDURE — 96372 THER/PROPH/DIAG INJ SC/IM: CPT | Mod: 59 | Performed by: EMERGENCY MEDICINE

## 2023-08-19 PROCEDURE — C9113 INJ PANTOPRAZOLE SODIUM, VIA: HCPCS | Performed by: EMERGENCY MEDICINE

## 2023-08-19 PROCEDURE — 85025 COMPLETE CBC W/AUTO DIFF WBC: CPT | Performed by: EMERGENCY MEDICINE

## 2023-08-19 PROCEDURE — 96375 TX/PRO/DX INJ NEW DRUG ADDON: CPT

## 2023-08-19 PROCEDURE — 93010 ELECTROCARDIOGRAM REPORT: CPT | Mod: ,,, | Performed by: INTERNAL MEDICINE

## 2023-08-19 PROCEDURE — 63600175 PHARM REV CODE 636 W HCPCS: Performed by: EMERGENCY MEDICINE

## 2023-08-19 PROCEDURE — 83880 ASSAY OF NATRIURETIC PEPTIDE: CPT | Performed by: EMERGENCY MEDICINE

## 2023-08-19 RX ORDER — PANTOPRAZOLE SODIUM 40 MG/10ML
40 INJECTION, POWDER, LYOPHILIZED, FOR SOLUTION INTRAVENOUS
Status: COMPLETED | OUTPATIENT
Start: 2023-08-19 | End: 2023-08-19

## 2023-08-19 RX ORDER — METHYLPREDNISOLONE ACETATE 40 MG/ML
40 INJECTION, SUSPENSION INTRA-ARTICULAR; INTRALESIONAL; INTRAMUSCULAR; SOFT TISSUE
Status: COMPLETED | OUTPATIENT
Start: 2023-08-19 | End: 2023-08-19

## 2023-08-19 RX ORDER — MORPHINE SULFATE 2 MG/ML
2 INJECTION, SOLUTION INTRAMUSCULAR; INTRAVENOUS
Status: COMPLETED | OUTPATIENT
Start: 2023-08-19 | End: 2023-08-19

## 2023-08-19 RX ORDER — ONDANSETRON 2 MG/ML
4 INJECTION INTRAMUSCULAR; INTRAVENOUS
Status: COMPLETED | OUTPATIENT
Start: 2023-08-19 | End: 2023-08-19

## 2023-08-19 RX ADMIN — METHYLPREDNISOLONE ACETATE 40 MG: 40 INJECTION, SUSPENSION INTRA-ARTICULAR; INTRALESIONAL; INTRAMUSCULAR; SOFT TISSUE at 04:08

## 2023-08-19 RX ADMIN — MORPHINE SULFATE 2 MG: 2 INJECTION, SOLUTION INTRAMUSCULAR; INTRAVENOUS at 01:08

## 2023-08-19 RX ADMIN — PANTOPRAZOLE SODIUM 40 MG: 40 INJECTION, POWDER, FOR SOLUTION INTRAVENOUS at 04:08

## 2023-08-19 RX ADMIN — SODIUM CHLORIDE, SODIUM LACTATE, POTASSIUM CHLORIDE, AND CALCIUM CHLORIDE 500 ML: .6; .31; .03; .02 INJECTION, SOLUTION INTRAVENOUS at 02:08

## 2023-08-19 RX ADMIN — ONDANSETRON 4 MG: 2 INJECTION INTRAMUSCULAR; INTRAVENOUS at 01:08

## 2023-08-19 NOTE — ED PROVIDER NOTES
Encounter Date: 2023       History     Chief Complaint   Patient presents with    Neck Pain     Burning sensation in in the neck and upper back x 2 months that intermittently radiates down the left arm.      87-year-old male with history of anxiety, depression, asbestos induced pleural plaque, gastroesophageal reflux, hypertension, hyperlipidemia, previous vocal cord cancer.  Patient presents emergency department with complaint of burning sensation to his left upper neck and back which radiates down to his left arm over last 2 months.  Patient states symptoms have been intermittent in nature.  Has been placed on Neurontin for symptoms and has had previous steroid shot in the past.  Patient had plain films of his neck which showed degenerative disc changes, according to his wife has not had a CT or MRI.  Patient does state that the pain does come to the left anterior chest wall region as well.  He denies shortness of breath, no nausea, vomiting, no abdominal pain, no back pain, no other constitutional symptoms.      Review of patient's allergies indicates:   Allergen Reactions    Codeine      Past Medical History:   Diagnosis Date    Anxiety     Asbestos-induced pleural plaque 2020    Depression     GERD (gastroesophageal reflux disease)     Hyperlipidemia     Hypertension     Vocal cord cancer      Past Surgical History:   Procedure Laterality Date    CATARACT EXTRACTION, BILATERAL      CHOLECYSTECTOMY      CIRCUMCISION      INGUINAL HERNIA REPAIR Right 2000    INGUINAL HERNIA REPAIR Left     RETINAL DETACHMENT SURGERY      THROAT SURGERY      X 2 for vocal cord cancer    TONSILLECTOMY      TRANSURETHRAL RESECTION OF PROSTATE      dr cindi ryan - [facility]     History reviewed. No pertinent family history.  Social History     Tobacco Use    Smoking status: Former     Current packs/day: 0.00     Types: Cigarettes     Quit date: 1982     Years since quittin.2    Smokeless tobacco: Never    Substance Use Topics    Alcohol use: Not Currently    Drug use: No     Review of Systems   Constitutional:  Negative for fever.   HENT:  Negative for sore throat.    Respiratory:  Negative for shortness of breath.    Cardiovascular:  Negative for chest pain.   Gastrointestinal:  Negative for nausea.   Genitourinary:  Negative for dysuria.   Musculoskeletal:  Positive for arthralgias, back pain, myalgias, neck pain and neck stiffness.   Skin:  Negative for rash.   Neurological:  Negative for dizziness, seizures, syncope, facial asymmetry, speech difficulty, weakness, light-headedness, numbness and headaches.   Hematological:  Does not bruise/bleed easily.       Physical Exam     Initial Vitals [08/19/23 1155]   BP Pulse Resp Temp SpO2   (!) 200/69 67 18 98.5 °F (36.9 °C) 99 %      MAP       --         Physical Exam    Nursing note and vitals reviewed.  Constitutional: He appears well-developed and well-nourished.   HENT:   Head: Normocephalic and atraumatic.   Nose: Nose normal.   Mouth/Throat: Oropharynx is clear and moist.   Eyes: Conjunctivae and EOM are normal. Pupils are equal, round, and reactive to light. No scleral icterus.   Neck: Neck supple.       Cardiovascular:  Normal rate, regular rhythm, normal heart sounds and intact distal pulses.     Exam reveals no gallop and no friction rub.       No murmur heard.  Pulmonary/Chest: No stridor. No respiratory distress.   Course bilateral breath sounds no adventitious sounds   Abdominal: Abdomen is soft. Bowel sounds are normal. He exhibits no mass. There is no abdominal tenderness. There is no rebound and no guarding.   Musculoskeletal:         General: No edema.      Cervical back: Neck supple. No rigidity. No spinous process tenderness or muscular tenderness. Decreased range of motion.     Lymphadenopathy:     He has no cervical adenopathy.   Neurological: He is alert and oriented to person, place, and time. He has normal strength and normal reflexes. No  cranial nerve deficit or sensory deficit. GCS score is 15. GCS eye subscore is 4. GCS verbal subscore is 5. GCS motor subscore is 6.   Skin: Skin is warm and dry. Capillary refill takes less than 2 seconds. No rash noted.   Psychiatric: He has a normal mood and affect. His behavior is normal. Judgment and thought content normal.         ED Course   Procedures  Labs Reviewed   CBC W/ AUTO DIFFERENTIAL - Abnormal; Notable for the following components:       Result Value    RBC 4.48 (*)     Hemoglobin 13.2 (*)     Platelets 123 (*)     All other components within normal limits   COMPREHENSIVE METABOLIC PANEL - Abnormal; Notable for the following components:    Alkaline Phosphatase 50 (*)     Anion Gap 4 (*)     All other components within normal limits   B-TYPE NATRIURETIC PEPTIDE - Abnormal; Notable for the following components:     (*)     All other components within normal limits   MAGNESIUM   TROPONIN I HIGH SENSITIVITY   TROPONIN I HIGH SENSITIVITY          Imaging Results              CT Cervical Spine Without Contrast (Final result)  Result time 08/19/23 14:07:37      Final result by Elliott Meyer Jr., MD (08/19/23 14:07:37)                   Narrative:    CT CERVICAL SPINE WITHOUT CONTRAST    CMS MANDATED QUALITY DATA - CT RADIATION  436    All CT scans at this facility utilize dose modulation, iterative reconstruction, and/or weight based dosing when appropriate to reduce radiation dose to as low as reasonably achievable.      HISTORY: Cervical radiculopathy.    Technical factors:   Spiral acquisition of the cervical spine are generated at 2.0 mm increments was performed followed by coronal and sagittal reconstruction images.        FINDINGS:  Lordotic convexity of the cervical spine is well maintained with appropriate stature and contour of all of the vertebral bodies including. Complete visualization of the cervical spine is noted to level of T2. Chronic multilevel intervertebral disc space  narrowing involving all segments of the cervical spine excluding the C2/C3 intervertebral disc level. The posterior elements are unremarkable. Chronic bilateral uncinate joint spur formation encroaching upon the bilateral neural foraminal spaces at the C3/C4 and C6/C7 intervertebral disc levels. Minimal degenerative narrowing of the atlantodens interval. Cord density is well-maintained. Mild bilateral apical pleural scarring is established. No significant prevertebral soft tissue normality. Posterior elements demonstrate evidence of asymmetric facet arthrosis with erosive element of the C4/C5 intervertebral level isolated towards the left.    IMPRESSION:  1. Chronic degenerative spinal changes which are too of the cervical neuraxis.  2. Bilateral facet arthrosis with evidence of an erosive component establish towards the left at C4/C5.    Electronically signed by:  Elliott Meyer MD  8/19/2023 2:07 PM CDT Workstation: [id number]                                     X-Ray Chest AP Portable (Final result)  Result time 08/19/23 13:42:36      Final result by Elliott Meyer Jr., MD (08/19/23 13:42:36)                   Narrative:    HISTORY: Chest Pain    COMPARISON:2/15/2023    FINDINGS: The cardiomediastinal shadow is normal in size. Few prominent interstitial densities are restricted towards the basilar segments bilaterally more prominent towards the left likely on a postinfectious or postinflammatory basis, however the consequence of an underlying atypical infection cannot be excluded the basis of these findings perhaps related to Mycoplasma pneumonia or other atypical infection.. Trace effusion suspected obscuring the left costophrenic sulcus. The tracheal column is without evidence of significant shadowing. Pulmonary vascularity is normal.    IMPRESSION: Prominent interstitial markings in the basilar segments attributed towards evolving interstitial pneumonia or perhaps related to Mycoplasma pneumoniae or other  atypical infection.    Electronically signed by:  Elliott Meyer MD  8/19/2023 1:42 PM CDT Workstation: [id number]                                     Medications   morphine injection 2 mg (2 mg Intravenous Given 8/19/23 1331)   ondansetron injection 4 mg (4 mg Intravenous Given 8/19/23 1329)   lactated ringers bolus 500 mL (0 mLs Intravenous Stopped 8/19/23 1531)   methylPREDNISolone acetate injection 40 mg (40 mg Intramuscular Given 8/19/23 1610)   pantoprazole injection 40 mg (40 mg Intravenous Given 8/19/23 1612)     Medical Decision Making  Amount and/or Complexity of Data Reviewed  Labs: ordered.  Radiology: ordered.    Risk  Prescription drug management.               ED Course as of 08/19/23 2305   Sat Aug 19, 2023   1637 Patient seen evaluated in the emergency department.  Currently this time patient found with cervical DJD with Chronic degenerative spinal changes which are too of the cervical neuraxis.  2. Bilateral facet arthrosis with evidence of an erosive component establish towards the left at C4/C5.  Patient was given Depo-Medrol in emergency department as well as morphine for pain control.  Patient in lieu of stating that pain radiated to his left chest underwent extended workup including cardiac workup in emergency department.  EKG showed no ischemic changes.  Cardiac enzymes were found to be normal x2 which troponin 3.9.  Patient labs reviewed with normal chemistry H&H at 13 and 40 with white count of 5000 platelet count of 123. [RM]      ED Course User Index  [RM] Alan Rothman MD               Medical Decision Making:   Initial Assessment:   87-year-old male with history of anxiety, depression, asbestos induced pleural plaque, gastroesophageal reflux, hypertension, hyperlipidemia, previous vocal cord cancer.  Patient presents emergency department with complaint of burning sensation to his left upper neck and back which radiates down to his left arm over last 2 months.  Patient states  symptoms have been intermittent in nature.  Has been placed on Neurontin for symptoms and has had previous steroid shot in the past.  Patient had plain films of his neck which showed degenerative disc changes, according to his wife has not had a CT or MRI.  Patient does state that the pain does come to the left anterior chest wall region as well.  He denies shortness of breath, no nausea, vomiting, no abdominal pain, no back pain, no other constitutional symptoms.    Differential Diagnosis:   Cervical radiculopathy, ACS, cervical strain, cervical compression fracture, cervical fracture  Clinical Tests:   Lab Tests: Ordered and Reviewed  Radiological Study: Ordered and Reviewed  Medical Tests: Ordered and Reviewed      Clinical Impression:   Final diagnoses:  [R07.9] Chest pain  [M54.12] Cervical radiculopathy (Primary)        ED Disposition Condition    Discharge Stable          ED Prescriptions    None       Follow-up Information       Follow up With Specialties Details Why Contact Info    Dominick Hernandez MD Physical Medicine and Rehabilitation Schedule an appointment as soon as possible for a visit in 1 week For recheck/continuing care [address] [place] [address] [address], [address]  [facility] [address]  [phone]               Alan Rothman MD  08/19/23 7374

## 2023-08-19 NOTE — ED NOTES
"Pt reports lower neck pain that radiates down to left shoulder blade and down his arm to his fingers. He stated "sometimes it will even go across to my chest". No distress noted at this time.  "

## 2023-08-21 ENCOUNTER — TELEPHONE (OUTPATIENT)
Dept: PAIN MEDICINE | Facility: CLINIC | Age: 88
End: 2023-08-21
Payer: MEDICARE

## 2023-08-21 ENCOUNTER — OFFICE VISIT (OUTPATIENT)
Dept: SPINE | Facility: CLINIC | Age: 88
End: 2023-08-21
Payer: MEDICARE

## 2023-08-21 VITALS — WEIGHT: 160 LBS | HEIGHT: 68 IN | BODY MASS INDEX: 24.25 KG/M2

## 2023-08-21 DIAGNOSIS — M54.12 CERVICAL RADICULITIS: ICD-10-CM

## 2023-08-21 DIAGNOSIS — M54.2 CERVICALGIA: Primary | ICD-10-CM

## 2023-08-21 DIAGNOSIS — M54.12 CERVICAL RADICULOPATHY: Primary | ICD-10-CM

## 2023-08-21 PROCEDURE — 1101F PR PT FALLS ASSESS DOC 0-1 FALLS W/OUT INJ PAST YR: ICD-10-PCS | Mod: CPTII,S$GLB,, | Performed by: PHYSICAL MEDICINE & REHABILITATION

## 2023-08-21 PROCEDURE — 1159F PR MEDICATION LIST DOCUMENTED IN MEDICAL RECORD: ICD-10-PCS | Mod: CPTII,S$GLB,, | Performed by: PHYSICAL MEDICINE & REHABILITATION

## 2023-08-21 PROCEDURE — 99204 OFFICE O/P NEW MOD 45 MIN: CPT | Mod: S$GLB,,, | Performed by: PHYSICAL MEDICINE & REHABILITATION

## 2023-08-21 PROCEDURE — 1125F AMNT PAIN NOTED PAIN PRSNT: CPT | Mod: CPTII,S$GLB,, | Performed by: PHYSICAL MEDICINE & REHABILITATION

## 2023-08-21 PROCEDURE — 1160F PR REVIEW ALL MEDS BY PRESCRIBER/CLIN PHARMACIST DOCUMENTED: ICD-10-PCS | Mod: CPTII,S$GLB,, | Performed by: PHYSICAL MEDICINE & REHABILITATION

## 2023-08-21 PROCEDURE — 1125F PR PAIN SEVERITY QUANTIFIED, PAIN PRESENT: ICD-10-PCS | Mod: CPTII,S$GLB,, | Performed by: PHYSICAL MEDICINE & REHABILITATION

## 2023-08-21 PROCEDURE — 3288F FALL RISK ASSESSMENT DOCD: CPT | Mod: CPTII,S$GLB,, | Performed by: PHYSICAL MEDICINE & REHABILITATION

## 2023-08-21 PROCEDURE — 1160F RVW MEDS BY RX/DR IN RCRD: CPT | Mod: CPTII,S$GLB,, | Performed by: PHYSICAL MEDICINE & REHABILITATION

## 2023-08-21 PROCEDURE — 1159F MED LIST DOCD IN RCRD: CPT | Mod: CPTII,S$GLB,, | Performed by: PHYSICAL MEDICINE & REHABILITATION

## 2023-08-21 PROCEDURE — 3288F PR FALLS RISK ASSESSMENT DOCUMENTED: ICD-10-PCS | Mod: CPTII,S$GLB,, | Performed by: PHYSICAL MEDICINE & REHABILITATION

## 2023-08-21 PROCEDURE — 99204 PR OFFICE/OUTPT VISIT, NEW, LEVL IV, 45-59 MIN: ICD-10-PCS | Mod: S$GLB,,, | Performed by: PHYSICAL MEDICINE & REHABILITATION

## 2023-08-21 PROCEDURE — 1101F PT FALLS ASSESS-DOCD LE1/YR: CPT | Mod: CPTII,S$GLB,, | Performed by: PHYSICAL MEDICINE & REHABILITATION

## 2023-08-21 NOTE — TELEPHONE ENCOUNTER
----- Message from Dominick Hernandez MD sent at 8/21/2023  3:27 PM CDT -----  Please schedule for interlaminar injection C7-T1

## 2023-08-21 NOTE — PROGRESS NOTES
SUBJECTIVE:    Patient ID: Bernardo Oliveira is a 87 y.o. male.    Chief Complaint: Neck Pain    This is an 87-year-old man who sees Dr. Marin for his primary care.  History of hypertension and hyperlipidemia as well as vocal cord cancer otherwise denies any chronic major medical problems.  Presents with approximately 1 month history of spontaneously occurring left-sided posterolateral neck discomfort radiates toward left scapula into the left shoulder and occasionally all the way down the left arm into the hand.  He denies difficulty writing or walking.  No bowel or bladder dysfunction fever chills sweats or unexpected weight loss.  He went to the emergency room on 2023 with complaints of neck pain radiating to the left arm.  He had cardiac workup including EKG and cardiac enzymes as well as chest x-ray all which were negative.  Was given morphine and steroid and sent home.  I reviewed a CT of the neck that was done during that ER visit which shows advanced multilevel cervical degenerative disc disease.  He presents to me feeling a bit better.  His pain level is 4/10 but at times goes up to 8/10.            Past Medical History:   Diagnosis Date    Anxiety     Asbestos-induced pleural plaque 2020    Depression     GERD (gastroesophageal reflux disease)     Hyperlipidemia     Hypertension     Vocal cord cancer      Social History     Socioeconomic History    Marital status:      Spouse name: Yana Oliveira   Occupational History    Occupation: Retired -  on ships   Tobacco Use    Smoking status: Former     Current packs/day: 0.00     Types: Cigarettes     Quit date: 1982     Years since quittin.2    Smokeless tobacco: Never   Substance and Sexual Activity    Alcohol use: Not Currently    Drug use: No    Sexual activity: Not Currently     Social Determinants of Health     Financial Resource Strain: Low Risk  (2022)    Overall Financial Resource Strain (CARDIA)      "Difficulty of Paying Living Expenses: Not very hard   Food Insecurity: No Food Insecurity (11/23/2022)    Hunger Vital Sign     Worried About Running Out of Food in the Last Year: Never true     Ran Out of Food in the Last Year: Never true   Transportation Needs: No Transportation Needs (11/23/2022)    PRAPARE - Transportation     Lack of Transportation (Medical): No     Lack of Transportation (Non-Medical): No   Physical Activity: Inactive (11/23/2022)    Exercise Vital Sign     Days of Exercise per Week: 0 days     Minutes of Exercise per Session: 0 min   Stress: Stress Concern Present (11/23/2022)    [employer] - Occupational Stress Questionnaire     Feeling of Stress : To some extent   Social Connections: Moderately Integrated (11/23/2022)    Social Connection and Isolation Panel [NHANES]     Frequency of Communication with Friends and Family: Twice a week     Frequency of Social Gatherings with Friends and Family: Once a week     Attends Orthodox Services: 1 to 4 times per year     Active Member of Clubs or Organizations: No     Attends Club or Organization Meetings: Never     Marital Status:    Housing Stability: Low Risk  (11/23/2022)    Housing Stability Vital Sign     Unable to Pay for Housing in the Last Year: No     Number of Places Lived in the Last Year: 1     Unstable Housing in the Last Year: No     Past Surgical History:   Procedure Laterality Date    CATARACT EXTRACTION, BILATERAL      CHOLECYSTECTOMY      CIRCUMCISION      INGUINAL HERNIA REPAIR Right 2000    INGUINAL HERNIA REPAIR Left 1995    RETINAL DETACHMENT SURGERY      THROAT SURGERY      X 2 for vocal cord cancer    TONSILLECTOMY      TRANSURETHRAL RESECTION OF PROSTATE  2004    dr cindi ryan - [facility]     History reviewed. No pertinent family history.  Vitals:    08/21/23 1506   Weight: 72.6 kg (160 lb)   Height: 5' 8" (1.727 m)       Review of Systems   Constitutional:  Negative for chills, " diaphoresis, fatigue, fever and unexpected weight change.   HENT:  Negative for trouble swallowing.    Eyes:  Negative for visual disturbance.   Respiratory:  Negative for shortness of breath.    Cardiovascular:  Negative for chest pain.   Gastrointestinal:  Negative for abdominal pain, constipation, diarrhea, nausea and vomiting.   Genitourinary:  Negative for difficulty urinating.   Musculoskeletal:  Negative for arthralgias, back pain, gait problem, joint swelling, myalgias, neck pain and neck stiffness.   Neurological:  Negative for dizziness, speech difficulty, weakness, light-headedness, numbness and headaches.          Objective:      Physical Exam  Neurological:      Mental Status: He is alert and oriented to person, place, and time.      Comments: He is awake and in no acute distress  Mild tenderness to palpation left posterior cervical paraspinous musculature with no external lesions or palpable masses  Cervical range of motion is within normal limits albeit with some discomfort at the endpoints of his range in all planes  Range of motion of the left shoulder is normal with mild pain over the lateral shoulder with internal and external rotation  Reflexes- +1-+2 reflexes at the following:   C5-Biceps   C6-Brachioradialis   C7-Triceps   L3/4-Patellar   S1-Achilles   Jill sign is negative bilaterally  Strength testing- 5/5 strength in the following muscle groups:  C5-Elbow flexion  C6-Wrist extension  C7-Elbow extension  C8-Finger flexion  T1-Finger abduction  L2-Hip flexion  L3-Knee extension  L4-Ankle dorsiflexion  L5-Great toe extension  S1-Ankle plantar flexion                    Assessment:       1. Cervicalgia    2. Cervical radiculitis           Plan:     He has a nonfocal neurological examination and no historical red flags.  I suspect he has neck pain on basis of rather advanced multilevel cervical degenerative disc disease.  He has symptoms suggestive of cervical radiculitis but no evidence of  any nerve root dysfunction.  I think he can be treated conservatively.  We are going to get him set up for an epidural steroid injection at C7-T1 and start him in therapy.  He can follow up with me afterwards      Cervicalgia  -     Ambulatory referral/consult to Physical/Occupational Therapy; Future; Expected date: 08/28/2023    Cervical radiculitis  -     Ambulatory referral/consult to Physical/Occupational Therapy; Future; Expected date: 08/28/2023

## 2023-09-01 DIAGNOSIS — M54.12 CERVICAL RADICULITIS: Primary | ICD-10-CM

## 2023-09-01 DIAGNOSIS — M54.2 CERVICALGIA: ICD-10-CM

## 2023-09-13 ENCOUNTER — TELEPHONE (OUTPATIENT)
Dept: SPINE | Facility: CLINIC | Age: 88
End: 2023-09-13
Payer: MEDICARE

## 2023-09-13 DIAGNOSIS — M54.2 CERVICALGIA: Primary | ICD-10-CM

## 2023-09-13 NOTE — TELEPHONE ENCOUNTER
----- Message from Riki Castellanos sent at 9/13/2023 11:09 AM CDT -----  Regarding: Return Calll  Contact: Stephany with PT  Type:  Patient Returning Call    Who Called:Stephany with  Physical Therapy  Who Left Message for Patient:office nurse  Does the patient know what this is regarding?:copy of PT referral patient would like to come to this clinic  Would the patient rather a call back or a response via [facility]?   Best Call Back Number:fax# [phone]  Additional Information: #[phone] ext [phone]

## 2023-09-18 ENCOUNTER — CLINICAL SUPPORT (OUTPATIENT)
Dept: REHABILITATION | Facility: HOSPITAL | Age: 88
End: 2023-09-18
Payer: MEDICARE

## 2023-09-18 DIAGNOSIS — M25.60 DECREASED RANGE OF MOTION: ICD-10-CM

## 2023-09-18 DIAGNOSIS — R29.3 ABNORMAL POSTURE: ICD-10-CM

## 2023-09-18 DIAGNOSIS — R53.1 DECREASED STRENGTH: ICD-10-CM

## 2023-09-18 DIAGNOSIS — M62.89 MUSCLE TIGHTNESS: ICD-10-CM

## 2023-09-18 DIAGNOSIS — M54.2 CERVICALGIA: ICD-10-CM

## 2023-09-18 PROCEDURE — 97110 THERAPEUTIC EXERCISES: CPT | Mod: PN

## 2023-09-18 PROCEDURE — 97161 PT EVAL LOW COMPLEX 20 MIN: CPT | Mod: PN

## 2023-09-18 NOTE — PLAN OF CARE
[facility]  Physical Therapy Initial Evaluation    Name: Bernardo Oliveira  Clinic Number: [id number]    Therapy Diagnosis:  Encounter Diagnoses   Name Primary?    Cervicalgia     Decreased strength     Muscle tightness     Decreased range of motion     Abnormal posture       Physician: Dominick Hernandez MD    Physician Orders: PT Eval and Treat  Medical Diagnosis from Referral: M54.2 (ICD-10-CM) - Cervicalgia   Evaluation Date: 9/18/2023  Authorization Period Expiration: 09/12/2024   Plan of Care Expiration: 12/30/2023    Progress Update: 10/18/2023  FOTO: 1 / 3    Visit # / Visits authorized: 1 / 1      PRECAUTIONS: Standard Precautions     Time In: 1135    Time Out: 1230  Total Appointment Time (timed & untimed codes): 55 minutes    SUBJECTIVE     Chief complaint:   P1:  Neck pain     P2:  L arm pain down to hand (entire hand)    History of current condition:  Pain started about 2 months ago.  Denies specific incident.   States that he works in his garden most of the day.  Pain intensity and frequency has been about the same since onset.   Denies dizziness, numbness, tingling.    Dominant Extremity: Right    Aggravating Factors:  looking down, looking up, weedeating  Easing Factors:  medication, heat    Imaging:  See epic.      Prior Therapy: N/A  Social History: Pt lives with their spouse  Occupation: Pt is retired.    Prior Level of Function: Independent with all ADLs  Current Level of Function: 65% of PLOF    Pain:  Current 5 /10, worst 8 /10, best 3 /10   Description: Aching and Dull    Pts goals: Pt reported goal is to be able to do his yardwork without pain.      _______________________________________________________  Medical History:   Past Medical History:   Diagnosis Date    Anxiety     Asbestos-induced pleural plaque 2/12/2020    Depression     GERD (gastroesophageal reflux disease)     Hyperlipidemia     Hypertension     Vocal cord cancer        Surgical History:    Bernardo Oliveira  has a past surgical history that includes Cholecystectomy; Throat surgery; Tonsillectomy; Transurethral resection of prostate (2004); Circumcision; Inguinal hernia repair (Right, 2000); Inguinal hernia repair (Left, 1995); Cataract extraction, bilateral; and Retinal detachment surgery.    Medications:   Bernardo has a current medication list which includes the following prescription(s): alprazolam, atorvastatin, augmented betamethasone dipropionate, metoprolol succinate, nabumetone, pantoprazole, and valsartan.    Allergies:   Review of patient's allergies indicates:   Allergen Reactions    Codeine         OBJECTIVE   (x = not tested due to pain and/or inability to obtain test position)    RANGE OF MOTION:    Cervical Right   (spine)  9/18/2023 Left     9/18/2023 Goal   Cervical Flexion (60) Wfl c pain --- 45     Cervical Extension (90) Wfl c pain --- 45     Cervical Side Bending (45) wfl Wfl c pain 45     Cervical Rotation (75) wfl Wfl c pain 60       Shoulder AROM/PROM Right  9/18/2023 Left  9/18/2023 Goal   Forward Flexion (180) 166 165 180     ER at 90 degrees (90) wfl wfl 90  Ini   ER at 0 degrees (45)  wfl wfl 45     Functional ER (C7) wfl wfl C7     Functional IR (T10) wfl wfl T10       STRENGTH:    U/E MMT Right  9/18/2023 Goal   Shoulder Flexion 4-/5 5/5 B   Shoulder Abduction 4-/5 5/5 B   Shoulder IR 4-/5 5/5 B   Shoulder ER 4-/5 5/5 B   Elbow Flexion  4-/5 5/5 B   Elbow Extension 4-/5 5/5 B     U/E MMT Left  9/18/2023 Goal   Shoulder Flexion 4-/5 5/5 B   Shoulder Abduction 4-/5 5/5 B   Shoulder IR 4-/5 5/5 B   Shoulder ER 4-/5 5/5 B   Elbow Flexion  4-/5 5/5 B   Elbow Extension 4-/5 5/5 B     MUSCLE LENGTH:     Muscle Tested  Right  9/18/2023 Left   9/18/2023 Goal   Upper Trapezius  decreased decreased Normal B    Levator Scapulae  normal normal Normal B   Sternocleidomastoid normal normal Normal B   Scalenes  normal normal Normal B    Pectoralis Minor  decreased decreased Normal B    Pectoralis Major decreased decreased Normal B     SPECIAL TESTS:     Right  9/18/2023 Left   9/18/2023 Goal   Spurlings Positive Positive Negative B    Quadrant Negative Negative Negative B    Miles Dennis Negative Negative Negative B    Neer Negative Negative Negative B        Sensation:  Sensation is intact to light touch    Palpation: Increased tone and tenderness noted with palpation to B upper trap and C3-C5 spinous processes.    Posture:  Pt presents with postural abnormalities which include: forward head and rounded shoulders    Gait: N/A    Movement Analysis: N/A    Balance: N/A      FUNCTION:         TREATMENT     Total Treatment time separate from Evaluation: (10) minutes    Bernardo received therapeutic exercises to develop strength, endurance, ROM, flexibility, and posture for (10) minutes including: x = exercises performed     TherEx 9/18/2023    Scapular squeezes x 3x10   Doorway stretch x 3x30 secs   Cervical rom x x10          Plan for Next Visit:     Upper Body Ergometer 2/2   Cervical range of motion x 20 reps each direction  Scapular squeezes 3 x 10 reps      Chin tucks 2 x 10 reps   Thoracic extension on foam roll  2 x 10 reps    Shoulder shrugs  3 x 10 reps   Retro shoulder rolls 3 x 10 reps   Open books  2 x 10 reps bilateral   Doorway stretch 3 x 30 seconds bilateral     Rows with red cord  3 x 10 reps   Shoulder extension with red cord 3 x 10 reps        Home Exercises and Patient Education Provided    Education/Self-Care provided:   Patient educated on the impairments noted above and the effects of physical therapy intervention to improve overall condition and QOL.   Patient was educated on all the above exercise prior/during/after for proper posture, positioning, and execution for safe performance with home exercise program.    Written Home Exercises Provided: yes. Prefers: Electronic Copies  Exercises were reviewed and Bernardo was able to demonstrate them prior to the end of the  session.  Bernardo demonstrated good understanding of the education provided.     See EMR under Patient Instructions for exercises provided during therapy sessions.    ASSESSMENT     Bernardo is a 88 y.o. male referred to outpatient Physical Therapy with a medical diagnosis of M54.2 (ICD-10-CM) - Cervicalgia .  Bernardo presents with clinical signs and symptoms that support this diagnosis with decreased Cervical ROM, decreased upper extremity strength, Cervical joint(s) hypomobility, upper extremity neural tension, and impaired functional mobility. Radicular symptoms are present from the cervical spine down into the left arm to hand.    decreased shoulder girdle ROM, decreased scapular and shoulder strength, Glenohumeral joint hypomobility, and impaired functional mobility.    The above impairments will be addressed through manual therapy techniques, therapeutic exercises, functional training, and modalities as necessary. Patient was treated and educated on exercises for home program, progression of therapy, and benefits of therapy to achieve full functional mobility.     Pt prognosis is Good.   Pt will benefit from skilled outpatient Physical Therapy to address the deficits stated above and in the chart below, provide pt/family education, and to maximize pt's level of independence.     Plan of care discussed with patient: Yes  Pt's spiritual, cultural and educational needs considered and patient is agreeable to the plan of care and goals as stated below:     Anticipated Barriers for therapy: none    Medical Necessity is demonstrated by the following  History  Co-morbidities and personal factors that may impact the plan of care Co-morbidities:   advanced age    Personal Factors:   no deficits     low   Examination  Body Structures and Functions, activity limitations and participation restrictions that may impact the plan of care Body Regions:   neck    Body Systems:    gross symmetry  ROM  strength  gross  "coordinated movement  motor control  motor learning    Participation Restrictions:   See above in "Current Level of Function"     Activity limitations:   Learning and applying knowledge  no deficits    General Tasks and Commands  no deficits    Communication  no deficits    Mobility  no deficits    Self care  no deficits    Domestic Life  no deficits    Interactions/Relationships  no deficits    Life Areas  no deficits    Community and Social Life  community life  recreation and leisure  no deficits         low   Clinical Presentation stable and uncomplicated low   Decision Making/ Complexity Score: low       GOALS:  SHORT TERM GOALS: 4 weeks 9/18/2023   Recent signs and systems trend is improving in order to progress towards LTG's.    Patient will be independent with HEP in order to further progress and return to maximal function.    Pain rating at Worst: 5/10 in order to progress towards increased independence with activity.    Patient will be able to correct postural deviations in sitting and standing, to decrease pain and promote postural awareness for injury prevention.       LONG TERM GOALS: 8 weeks 9/18/2023   Patient will return to normal ADL, recreational, and work related activities with less pain and limitation.     Patient will improve AROM to stated goals in order to return to maximal functional potential.     Patient will improve Strength to stated goals of appropriate musculature in order to improve functional independence.     Pain Rating at Best: 1/10 to improve Quality of Life.     Patient will meet predicted functional outcome (FOTO) score: 70% to increase self-worth & perceived functional ability.    Patient will have met/partially met personal goal of being able to perform yard work with no pain.        PLAN   Plan of care Certification: 9/18/2023 to 12/30/2023    Outpatient Physical Therapy 2 times weekly for 6 weeks to include any combination of the following interventions: virtual visits, dry " needling, modalities, electrical stimulation (IFC, Pre-Mod, Attended with Functional Dry Needling), Manual Therapy, Moist Heat/ Ice, Neuromuscular Re-ed, Patient Education, Self Care, Therapeutic Exercise, Functional Training, and Therapeutic Activites     Thank you for this referral.    These services are reasonable and necessary for the conditions set forth above while under my care.    Alexey Leon, PT, DPT

## 2023-09-22 ENCOUNTER — CLINICAL SUPPORT (OUTPATIENT)
Dept: REHABILITATION | Facility: HOSPITAL | Age: 88
End: 2023-09-22
Payer: MEDICARE

## 2023-09-22 ENCOUNTER — DOCUMENTATION ONLY (OUTPATIENT)
Dept: REHABILITATION | Facility: HOSPITAL | Age: 88
End: 2023-09-22

## 2023-09-22 DIAGNOSIS — R29.3 ABNORMAL POSTURE: ICD-10-CM

## 2023-09-22 DIAGNOSIS — M62.89 MUSCLE TIGHTNESS: ICD-10-CM

## 2023-09-22 DIAGNOSIS — M25.60 DECREASED RANGE OF MOTION: ICD-10-CM

## 2023-09-22 DIAGNOSIS — R53.1 DECREASED STRENGTH: Primary | ICD-10-CM

## 2023-09-22 PROCEDURE — 97110 THERAPEUTIC EXERCISES: CPT | Mod: PN,CQ

## 2023-09-22 PROCEDURE — 97112 NEUROMUSCULAR REEDUCATION: CPT | Mod: PN,CQ

## 2023-09-22 NOTE — PROGRESS NOTES
PT/PTA met face to face to discuss pt's treatment plan and progress towards established goals. Pt will be seen by a physical therapist minimally every 6th visit or every 30 days.      Morena Frausto PTA

## 2023-09-22 NOTE — PROGRESS NOTES
[facility]   Physical Therapy Treatment Note      Name: Bernardo Oliveira  Clinic Number: [id number]    Therapy Diagnosis:   Encounter Diagnoses   Name Primary?    Decreased strength Yes    Muscle tightness     Decreased range of motion     Abnormal posture      Physician: Dominick Hernandez MD    Visit Date: 9/22/2023    Physician: Dominick Hernandez MD    Physician Orders: PT Eval and Treat  Medical Diagnosis from Referral: M54.2 (ICD-10-CM) - Cervicalgia   Evaluation Date: 9/18/2023  Authorization Period Expiration: 12/31/2023  Plan of Care Expiration: 12/30/2023                          Progress Update: 10/18/2023                      FOTO: 1 / 3    Visit # / Visits authorized: 2 / 20                      PTA Visit #: 1/5        PRECAUTIONS: Standard Precautions, Hard of hearing      Time In: 1101  Time Out: 1146  Total Appointment Time: 45 minutes        Subjective     Patient reports: he's not really having pain but his neck feel sore.  He was compliant with home exercise program.  Response to previous treatment: no complaints  Functional change: first visit after eval    Pain: 6/10  Location: bilateral neck      Objective      Objective Measures updated at progress report unless specified.     Treatment     Bernardo received the treatments listed below:      therapeutic exercises to develop strength, endurance, ROM, flexibility, and posture for 25 minutes including:  neuromuscular re-education activities to improve: Posture for 15 minutes.      Upper Body Ergometer 2/2     Cervical range of motion x 20 reps each direction  Scapular squeezes 2 x 10 reps (Neuromuscular re-education)  Shoulder shrugs  2 x 10 reps   Retro shoulder rolls 2 x 10 reps  (Neuromuscular re-education)  Chin tucks 2 x 10 reps (pt unable)    Thoracic extension on foam roll 2 x 10 reps  (Neuromuscular re-education)  Open books  2 x 10 reps bilateral   Doorway stretch 3 x 30 seconds bilateral (not performed  today)       therapeutic activities to improve functional performance for 5  minutes  Rows with red cord 3 x 10 reps   Shoulder extension with red cord 3 x 10 reps     Patient Education and Home Exercises       Education provided:   -apply ice as needed for delayed muscle soreness  -Continue with Home exercise program daily     Written Home Exercises Provided: yes. Exercises were reviewed and Bernardo was able to demonstrate them prior to the end of the session.  Bernardo demonstrated good  understanding of the education provided. See Electronic Medical Record under Patient Instructions for exercises provided during therapy sessions    Assessment     Bernardo provided good effort and participation toward therapeutic interventions today with focus on  posture exercises and education. Verbal/tactile cuing throughout for posture.  Pt had difficulty with technique for chin tucks.  Exercises stopped.  Pt is hard of hearing.    Bernardo Is progressing well towards his goals.   Patient prognosis is Fair.     Patient will continue to benefit from skilled outpatient physical therapy to address the deficits listed in the problem list box on initial evaluation, provide pt/family education and to maximize pt's level of independence in the home and community environment.     Patient's spiritual, cultural and educational needs considered and pt agreeable to plan of care and goals.     Anticipated barriers to physical therapy: none    Goals:   SHORT TERM GOALS: 4 weeks 9/18/2023   Recent signs and systems trend is improving in order to progress towards LTG's.     Patient will be independent with HEP in order to further progress and return to maximal function.     Pain rating at Worst: 5/10 in order to progress towards increased independence with activity.     Patient will be able to correct postural deviations in sitting and standing, to decrease pain and promote postural awareness for injury prevention.         LONG TERM GOALS: 8  weeks 9/18/2023   Patient will return to normal ADL, recreational, and work related activities with less pain and limitation.      Patient will improve AROM to stated goals in order to return to maximal functional potential.      Patient will improve Strength to stated goals of appropriate musculature in order to improve functional independence.      Pain Rating at Best: 1/10 to improve Quality of Life.      Patient will meet predicted functional outcome (FOTO) score: 70% to increase self-worth & perceived functional ability.     Patient will have met/partially met personal goal of being able to perform yard work with no pain.           PLAN   Plan of care Certification: 9/18/2023 to 12/30/2023     Outpatient Physical Therapy 2 times weekly for 6 weeks to include any combination of the following interventions: virtual visits, dry needling, modalities, electrical stimulation (IFC, Pre-Mod, Attended with Functional Dry Needling), Manual Therapy, Moist Heat/ Ice, Neuromuscular Re-ed, Patient Education, Self Care, Therapeutic Exercise, Functional Training, and Therapeutic Activites        Morena Frausto, SULMA

## 2023-09-25 ENCOUNTER — CLINICAL SUPPORT (OUTPATIENT)
Dept: REHABILITATION | Facility: HOSPITAL | Age: 88
End: 2023-09-25
Payer: MEDICARE

## 2023-09-25 DIAGNOSIS — M62.89 MUSCLE TIGHTNESS: ICD-10-CM

## 2023-09-25 DIAGNOSIS — M25.60 DECREASED RANGE OF MOTION: ICD-10-CM

## 2023-09-25 DIAGNOSIS — R53.1 DECREASED STRENGTH: Primary | ICD-10-CM

## 2023-09-25 DIAGNOSIS — R29.3 ABNORMAL POSTURE: ICD-10-CM

## 2023-09-25 PROCEDURE — 97110 THERAPEUTIC EXERCISES: CPT | Mod: PN,CQ

## 2023-09-25 PROCEDURE — 97112 NEUROMUSCULAR REEDUCATION: CPT | Mod: PN,CQ

## 2023-09-25 NOTE — PROGRESS NOTES
[facility]   Physical Therapy Treatment Note      Name: Bernardo Oliveira  Clinic Number: [id number]    Therapy Diagnosis:   Encounter Diagnoses   Name Primary?    Decreased strength Yes    Muscle tightness     Decreased range of motion     Abnormal posture      Physician: Dominick Hernandez MD    Visit Date: 9/25/2023    Physician: Dominick Hernandez MD    Physician Orders: PT Eval and Treat  Medical Diagnosis from Referral: M54.2 (ICD-10-CM) - Cervicalgia   Evaluation Date: 9/18/2023  Authorization Period Expiration: 12/31/2023  Plan of Care Expiration: 12/30/2023                          Progress Update: 10/18/2023                      FOTO: 1 / 3    Visit # / Visits authorized: 3 / 20                      PTA Visit #: 2/5        PRECAUTIONS: Standard Precautions, Hard of hearing      Time In: 1000  Time Out: 1043  Total Appointment Time: 43 minutes    Subjective     Patient reports: he feels sore below the Left shoulder blade  He was compliant with home exercise program.  Response to previous treatment: no complaints  Functional change: first visit after eval    Pain: 4/10  Location: bilateral neck      Objective      Objective Measures updated at progress report unless specified.     Treatment     Bernardo received the treatments listed below:      therapeutic exercises to develop strength, endurance, ROM, flexibility, and posture for 25 minutes including:  neuromuscular re-education activities to improve: Posture for 18 minutes.    Upper Body Ergometer 2/2     Cervical range of motion x 20 reps each direction  Scapular squeezes 2 x 10 reps (Neuromuscular re-education)  Shoulder shrugs  2 x 10 reps   Retro shoulder rolls 2 x 10 reps  (Neuromuscular re-education)  Chin tucks 2 x 10 reps (not performed)    Thoracic extension on foam roll 2 x 10 reps  (Neuromuscular re-education)  Open books  2 x 10 reps bilateral   Doorway stretch 3 x 30 seconds bilateral       therapeutic activities  to improve functional performance for 5 minutes  Rows with red theraband 3 x 10 reps   Shoulder extension with red theraband 3 x 10 reps     Patient Education and Home Exercises       Education provided:   -apply ice as needed for delayed muscle soreness  -Continue with Home exercise program daily     Written Home Exercises Provided: yes. Exercises were reviewed and Bernardo was able to demonstrate them prior to the end of the session.  Bernardo demonstrated good  understanding of the education provided. See Electronic Medical Record under Patient Instructions for exercises provided during therapy sessions    Assessment     Bernardo present to PT with increased soreness in his Left shoulder/scapula area.  Discomfort could possibly be from patient cutting the grass yesterday.    Pt is doing well with PT exercises.  Verbal cuing for postural awareness.    Bernardo Is progressing well towards his goals.   Patient prognosis is Fair.     Patient will continue to benefit from skilled outpatient physical therapy to address the deficits listed in the problem list box on initial evaluation, provide pt/family education and to maximize pt's level of independence in the home and community environment.     Patient's spiritual, cultural and educational needs considered and pt agreeable to plan of care and goals.     Anticipated barriers to physical therapy: none    Goals:   SHORT TERM GOALS: 4 weeks 9/18/2023   Recent signs and systems trend is improving in order to progress towards LTG's.     Patient will be independent with HEP in order to further progress and return to maximal function.     Pain rating at Worst: 5/10 in order to progress towards increased independence with activity.     Patient will be able to correct postural deviations in sitting and standing, to decrease pain and promote postural awareness for injury prevention.         LONG TERM GOALS: 8 weeks 9/18/2023   Patient will return to normal ADL, recreational,  and work related activities with less pain and limitation.      Patient will improve AROM to stated goals in order to return to maximal functional potential.      Patient will improve Strength to stated goals of appropriate musculature in order to improve functional independence.      Pain Rating at Best: 1/10 to improve Quality of Life.      Patient will meet predicted functional outcome (FOTO) score: 70% to increase self-worth & perceived functional ability.     Patient will have met/partially met personal goal of being able to perform yard work with no pain.           PLAN   Plan of care Certification: 9/18/2023 to 12/30/2023     Outpatient Physical Therapy 2 times weekly for 6 weeks to include any combination of the following interventions: virtual visits, dry needling, modalities, electrical stimulation (IFC, Pre-Mod, Attended with Functional Dry Needling), Manual Therapy, Moist Heat/ Ice, Neuromuscular Re-ed, Patient Education, Self Care, Therapeutic Exercise, Functional Training, and Therapeutic Activites        Morena Frausto, SULMA

## 2023-10-05 ENCOUNTER — CLINICAL SUPPORT (OUTPATIENT)
Dept: REHABILITATION | Facility: HOSPITAL | Age: 88
End: 2023-10-05
Payer: MEDICARE

## 2023-10-05 DIAGNOSIS — M62.89 MUSCLE TIGHTNESS: ICD-10-CM

## 2023-10-05 DIAGNOSIS — M25.60 DECREASED RANGE OF MOTION: ICD-10-CM

## 2023-10-05 DIAGNOSIS — R53.1 DECREASED STRENGTH: Primary | ICD-10-CM

## 2023-10-05 DIAGNOSIS — R29.3 ABNORMAL POSTURE: ICD-10-CM

## 2023-10-05 PROCEDURE — 97112 NEUROMUSCULAR REEDUCATION: CPT | Mod: PO

## 2023-10-05 PROCEDURE — 97110 THERAPEUTIC EXERCISES: CPT | Mod: PO

## 2023-10-05 NOTE — PROGRESS NOTES
[facility]   Physical Therapy Treatment Note      Name: Bernardo Oliveira  Clinic Number: [id number]    Therapy Diagnosis:   Encounter Diagnoses   Name Primary?    Decreased strength Yes    Muscle tightness     Decreased range of motion     Abnormal posture      Physician: Dominick Hernandez MD    Visit Date: 10/5/2023    Physician: Dominick Hernandez MD    Physician Orders: PT Eval and Treat  Medical Diagnosis from Referral: M54.2 (ICD-10-CM) - Cervicalgia   Evaluation Date: 9/18/2023  Authorization Period Expiration: 12/31/2023  Plan of Care Expiration: 12/30/2023                          Progress Update: 10/18/2023                      FOTO: 1 / 3    Visit # / Visits authorized: 4 / 20 (POC 3/12)                      PTA Visit #: 2/5        PRECAUTIONS: Standard Precautions, Hard of hearing      Time In: 1130  Time Out: 1214  Total Appointment Time: 44 minutes    Subjective     Patient reports: still feels sore below the Left shoulder blade. Pain is higher in the mornings.    He was compliant with home exercise program.  Response to previous treatment: no complaints  Functional change: none reported    Pain: 4/10  Location: bilateral neck      Objective      Objective Measures updated at progress report unless specified.     Treatment     Bernardo received the treatments listed below:      therapeutic exercises to develop strength, endurance, ROM, and flexibility for 18 minutes including:  neuromuscular re-education activities to improve: Posture for 26 minutes.    Upper Body Ergometer 2/2 - NOT PERFORMED      SEATED: NO arms on chair  Shoulder shrugs  x 10 reps   Retro shoulder rolls 2 x 10 reps  (Neuromuscular re-education)  LEFT only Upper Trapezius stretch, arms by side, 3x15 sec  Scapular squeezes w Bilateral External Rotation 2 x 10 reps (Neuromuscular re-education)  Cervical range of motion x 10 reps each direction  Thoracic extension on 1/2 foam roll 2 x 10 reps   (Neuromuscular re-education)    Open books  2 x 10 reps bilateral   Doorway stretch 3 x 30 seconds bilateral       therapeutic activities to improve functional performance for 0 minutes  Rows with red theraband 3 x 10 reps   Shoulder extension with red theraband 3 x 10 reps     Patient Education and Home Exercises       Education provided:   -apply ice as needed for delayed muscle soreness  -Continue with Home exercise program daily     Written Home Exercises Provided: yes. Exercises were reviewed and Bernardo was able to demonstrate them prior to the end of the session.  Bernardo demonstrated good  understanding of the education provided. See Electronic Medical Record under Patient Instructions for exercises provided during therapy sessions    Assessment     Pt demonstrates low levels of pain, Upper Trapezius guarding and kyphotic posture. Pt able to perform all therex given with minimal pn provocation. Focus on posture and periscapular strengthening. Continue to progress as tolerated.     Bernardo Is progressing well towards his goals.   Patient prognosis is Fair.     Patient will continue to benefit from skilled outpatient physical therapy to address the deficits listed in the problem list box on initial evaluation, provide pt/family education and to maximize pt's level of independence in the home and community environment.     Patient's spiritual, cultural and educational needs considered and pt agreeable to plan of care and goals.     Anticipated barriers to physical therapy: none    Goals:   SHORT TERM GOALS: 4 weeks PROGRESS 10/5/2023    Recent signs and systems trend is improving in order to progress towards LTG's.     Patient will be independent with HEP in order to further progress and return to maximal function.     Pain rating at Worst: 5/10 in order to progress towards increased independence with activity.     Patient will be able to correct postural deviations in sitting and standing, to decrease pain  and promote postural awareness for injury prevention.         LONG TERM GOALS: 8 weeks    Patient will return to normal ADL, recreational, and work related activities with less pain and limitation.      Patient will improve AROM to stated goals in order to return to maximal functional potential.      Patient will improve Strength to stated goals of appropriate musculature in order to improve functional independence.      Pain Rating at Best: 1/10 to improve Quality of Life.      Patient will meet predicted functional outcome (FOTO) score: 70% to increase self-worth & perceived functional ability.     Patient will have met/partially met personal goal of being able to perform yard work with no pain.           PLAN     Continue PT as deemed per POC:  shoulder stabilization, cervical mobility    Plan of care Certification: 9/18/2023 to 12/30/2023     Outpatient Physical Therapy 2 times weekly for 6 weeks to include any combination of the following interventions: virtual visits, dry needling, modalities, electrical stimulation (IFC, Pre-Mod, Attended with Functional Dry Needling), Manual Therapy, Moist Heat/ Ice, Neuromuscular Re-ed, Patient Education, Self Care, Therapeutic Exercise, Functional Training, and Therapeutic Activites        Becca Mir, PT

## 2023-10-11 ENCOUNTER — CLINICAL SUPPORT (OUTPATIENT)
Dept: REHABILITATION | Facility: HOSPITAL | Age: 88
End: 2023-10-11
Payer: MEDICARE

## 2023-10-11 DIAGNOSIS — M25.60 DECREASED RANGE OF MOTION: ICD-10-CM

## 2023-10-11 DIAGNOSIS — R53.1 DECREASED STRENGTH: Primary | ICD-10-CM

## 2023-10-11 DIAGNOSIS — M62.89 MUSCLE TIGHTNESS: ICD-10-CM

## 2023-10-11 DIAGNOSIS — R29.3 ABNORMAL POSTURE: ICD-10-CM

## 2023-10-11 PROCEDURE — 97112 NEUROMUSCULAR REEDUCATION: CPT | Mod: PO

## 2023-10-11 PROCEDURE — 97530 THERAPEUTIC ACTIVITIES: CPT | Mod: PO

## 2023-10-11 PROCEDURE — 97110 THERAPEUTIC EXERCISES: CPT | Mod: PO

## 2023-10-11 NOTE — PROGRESS NOTES
[facility]   Physical Therapy Treatment Note      Name: Bernardo Oliveira  Clinic Number: [id number]    Therapy Diagnosis:   Encounter Diagnoses   Name Primary?    Decreased strength Yes    Muscle tightness     Decreased range of motion     Abnormal posture      Physician: Dominick Hernandez MD    Visit Date: 10/11/2023    Physician: Dominick Hernandez MD    Physician Orders: PT Eval and Treat  Medical Diagnosis from Referral: M54.2 (ICD-10-CM) - Cervicalgia   Evaluation Date: 9/18/2023  Authorization Period Expiration: 12/31/2023  Plan of Care Expiration: 12/30/2023                          Progress Update: 10/18/2023                      FOTO: 1 / 3    Visit # / Visits authorized: 5 / 20 (POC 4 / 12)                            PRECAUTIONS: Standard Precautions, Hard of hearing      Time In: 1130  Time Out: 1214  Total Appointment Time: 44 minutes    Subjective     Patient reports: no pain today; feeling good.     He was compliant with home exercise program.  Response to previous treatment: no complaints  Functional change: none reported    Pain: 0/10  Location: bilateral neck, >L      Objective      Objective Measures updated at progress report unless specified.     Treatment     Bernardo received the treatments listed below:      therapeutic exercises to develop strength, endurance, ROM, and flexibility for 15 minutes including:  neuromuscular re-education activities to improve: Posture for 20 minutes.    Upper Body Ergometer 2/2 - NOT PERFORMED      SEATED: NO arms on chair  +Manual Hot Pack over Upper Trapezius, 5 minutes  Shoulder shrugs  x 10 reps   Retro shoulder rolls 2 x 10 reps   Upper Trapezius stretch, arms by side, 2x15 sec - LEFT last  Scapular squeezes w Bilateral External Rotation 2 x 10 reps   Cervical range of motion x 10 reps each direction  Thoracic extension on 1/2 foam roll, arms cross chest, 2 x 10 reps     STAND:  Open books  10x bilateral   Doorway stretch 3 x  15 seconds bilateral       therapeutic activities to improve functional performance for 9 minutes  Rows with red theraband 3 x 10 reps   Shoulder extension with red theraband 3 x 10 reps      Add NEXT:  Manual tx     Patient Education and Home Exercises       Education provided:   -apply ice as needed for delayed muscle soreness  -Continue with Home exercise program daily     Written Home Exercises Provided: yes. Exercises were reviewed and Bernardo was able to demonstrate them prior to the end of the session.  Bernardo demonstrated good  understanding of the education provided. See Electronic Medical Record under Patient Instructions for exercises provided during therapy sessions    Assessment     Pt demonstrates low levels of pain, Upper Trapezius guarding and kyphotic posture. Pt able to perform all therex given with minimal pn provocation. Focus on posture and periscapular strengthening. Returned to periscapular and mid trap strengthening this session. Continue to progress as tolerated.     Bernardo Is progressing well towards his goals.   Patient prognosis is Fair.     Patient will continue to benefit from skilled outpatient physical therapy to address the deficits listed in the problem list box on initial evaluation, provide pt/family education and to maximize pt's level of independence in the home and community environment.     Patient's spiritual, cultural and educational needs considered and pt agreeable to plan of care and goals.     Anticipated barriers to physical therapy: none    Goals:   SHORT TERM GOALS: 4 weeks PROGRESS 10/11/2023    Recent signs and systems trend is improving in order to progress towards LTG's.     Patient will be independent with HEP in order to further progress and return to maximal function.     Pain rating at Worst: 5/10 in order to progress towards increased independence with activity.     Patient will be able to correct postural deviations in sitting and standing, to decrease  pain and promote postural awareness for injury prevention.         LONG TERM GOALS: 8 weeks    Patient will return to normal ADL, recreational, and work related activities with less pain and limitation.      Patient will improve AROM to stated goals in order to return to maximal functional potential.      Patient will improve Strength to stated goals of appropriate musculature in order to improve functional independence.      Pain Rating at Best: 1/10 to improve Quality of Life.      Patient will meet predicted functional outcome (FOTO) score: 70% to increase self-worth & perceived functional ability.     Patient will have met/partially met personal goal of being able to perform yard work with no pain.           PLAN     Continue PT as deemed per POC:  shoulder stabilization, cervical mobility    Plan of care Certification: 9/18/2023 to 12/30/2023     Outpatient Physical Therapy 2 times weekly for 6 weeks to include any combination of the following interventions: virtual visits, dry needling, modalities, electrical stimulation (IFC, Pre-Mod, Attended with Functional Dry Needling), Manual Therapy, Moist Heat/ Ice, Neuromuscular Re-ed, Patient Education, Self Care, Therapeutic Exercise, Functional Training, and Therapeutic Activites        Becca Mir, PT

## 2023-10-13 ENCOUNTER — CLINICAL SUPPORT (OUTPATIENT)
Dept: REHABILITATION | Facility: HOSPITAL | Age: 88
End: 2023-10-13
Payer: MEDICARE

## 2023-10-13 DIAGNOSIS — R53.1 DECREASED STRENGTH: Primary | ICD-10-CM

## 2023-10-13 DIAGNOSIS — R29.3 ABNORMAL POSTURE: ICD-10-CM

## 2023-10-13 DIAGNOSIS — M62.89 MUSCLE TIGHTNESS: ICD-10-CM

## 2023-10-13 DIAGNOSIS — M25.60 DECREASED RANGE OF MOTION: ICD-10-CM

## 2023-10-13 PROCEDURE — 97110 THERAPEUTIC EXERCISES: CPT | Mod: PO

## 2023-10-13 PROCEDURE — 97112 NEUROMUSCULAR REEDUCATION: CPT | Mod: PO

## 2023-10-13 PROCEDURE — 97530 THERAPEUTIC ACTIVITIES: CPT | Mod: PO

## 2023-10-13 NOTE — PROGRESS NOTES
[facility]   Physical Therapy Treatment Note      Name: Bernardo Oliveira  Clinic Number: [id number]    Therapy Diagnosis:   Encounter Diagnoses   Name Primary?    Decreased strength Yes    Muscle tightness     Decreased range of motion     Abnormal posture      Physician: Dominick Hernandez MD    Visit Date: 10/13/2023    Physician: Dominick Hernandez MD    Physician Orders: PT Eval and Treat  Medical Diagnosis from Referral: M54.2 (ICD-10-CM) - Cervicalgia   Evaluation Date: 9/18/2023  Authorization Period Expiration: 12/31/2023  Plan of Care Expiration: 12/30/2023                          Progress Update: 10/18/2023                          Visit # / Visits authorized: 6 / 20 (POC 5 / 12)                            PRECAUTIONS: Standard Precautions, Hard of hearing      Time In: 1130  Time Out: 1214  Total Appointment Time: 44 minutes    Subjective     Patient reports: he has soreness after gardening.    He was compliant with home exercise program.  Response to previous treatment: no complaints  Functional change: none reported    Pain: 1-2/10  Location: bilateral neck, >L      Objective      Objective Measures updated at progress report unless specified.     Treatment     Bernardo received the treatments listed below:      therapeutic exercises to develop strength, endurance, ROM, and flexibility for 15 minutes including:  neuromuscular re-education activities to improve: Posture for 20 minutes.    Upper Body Ergometer 2/2 - NOT PERFORMED      SEATED: NO arms on chair  Manual Hot Pack over Upper Trapezius, 5 minutes  Shoulder shrugs  x 10 reps   Retro shoulder rolls 2 x 10 reps   Upper Trapezius stretch, arms by side, 2x15 sec - LEFT last  Scapular squeezes w Bilateral External Rotation 20x reps   Cervical range of motion x 10 reps each direction  Thoracic extension on 1/2 foam roll, arms cross chest, 20x reps     STAND:  Open books  10x bilateral   Doorway stretch 3 x 15 seconds  bilateral       therapeutic activities to improve functional performance for 9 minutes  Rows with red theraband 3 x 10 reps   Shoulder extension with red theraband 3 x 10 reps      Add NEXT:  Manual tx     Patient Education and Home Exercises       Education provided:   -apply ice as needed for delayed muscle soreness  -Continue with Home exercise program daily     Written Home Exercises Provided: yes. Exercises were reviewed and Bernardo was able to demonstrate them prior to the end of the session.  Bernardo demonstrated good  understanding of the education provided. See Electronic Medical Record under Patient Instructions for exercises provided during therapy sessions    Assessment     Pt demonstrates low levels of pain, Upper Trapezius guarding and kyphotic posture. Pt able to perform all therex given with minimal pn provocation. Modified positioning for thoracic extension stretch to increase arc of thoracic motion. Focus on posture and periscapular strengthening. Continue to progress as tolerated.     Bernardo Is progressing well towards his goals.   Patient prognosis is Fair.     Patient will continue to benefit from skilled outpatient physical therapy to address the deficits listed in the problem list box on initial evaluation, provide pt/family education and to maximize pt's level of independence in the home and community environment.     Patient's spiritual, cultural and educational needs considered and pt agreeable to plan of care and goals.     Anticipated barriers to physical therapy: none    Goals:   SHORT TERM GOALS: 4 weeks PROGRESS 10/13/2023    Recent signs and systems trend is improving in order to progress towards LTG's.     Patient will be independent with HEP in order to further progress and return to maximal function.     Pain rating at Worst: 5/10 in order to progress towards increased independence with activity.     Patient will be able to correct postural deviations in sitting and standing,  to decrease pain and promote postural awareness for injury prevention.         LONG TERM GOALS: 8 weeks    Patient will return to normal ADL, recreational, and work related activities with less pain and limitation.      Patient will improve AROM to stated goals in order to return to maximal functional potential.      Patient will improve Strength to stated goals of appropriate musculature in order to improve functional independence.      Pain Rating at Best: 1/10 to improve Quality of Life.      Patient will meet predicted functional outcome (FOTO) score: 70% to increase self-worth & perceived functional ability.     Patient will have met/partially met personal goal of being able to perform yard work with no pain.           PLAN     Continue PT as deemed per POC:  shoulder stabilization, cervical mobility    Plan of care Certification: 9/18/2023 to 12/30/2023     Outpatient Physical Therapy 2 times weekly for 6 weeks to include any combination of the following interventions: virtual visits, dry needling, modalities, electrical stimulation (IFC, Pre-Mod, Attended with Functional Dry Needling), Manual Therapy, Moist Heat/ Ice, Neuromuscular Re-ed, Patient Education, Self Care, Therapeutic Exercise, Functional Training, and Therapeutic Activites        Becca Mir, PT

## 2023-10-18 ENCOUNTER — CLINICAL SUPPORT (OUTPATIENT)
Dept: REHABILITATION | Facility: HOSPITAL | Age: 88
End: 2023-10-18
Payer: MEDICARE

## 2023-10-18 DIAGNOSIS — M62.89 MUSCLE TIGHTNESS: ICD-10-CM

## 2023-10-18 DIAGNOSIS — R29.3 ABNORMAL POSTURE: ICD-10-CM

## 2023-10-18 DIAGNOSIS — R53.1 DECREASED STRENGTH: Primary | ICD-10-CM

## 2023-10-18 DIAGNOSIS — M25.60 DECREASED RANGE OF MOTION: ICD-10-CM

## 2023-10-18 PROCEDURE — 97112 NEUROMUSCULAR REEDUCATION: CPT | Mod: PO

## 2023-10-18 PROCEDURE — 97110 THERAPEUTIC EXERCISES: CPT | Mod: PO

## 2023-10-18 NOTE — PROGRESS NOTES
[facility]   Physical Therapy Treatment Note      Name: Bernardo Oliveira  Clinic Number: [id number]    Therapy Diagnosis:   Encounter Diagnoses   Name Primary?    Decreased strength Yes    Muscle tightness     Decreased range of motion     Abnormal posture      Physician: Dominick Hernandez MD    Visit Date: 10/18/2023    Physician: Dominick Hernandez MD    Physician Orders: PT Eval and Treat  Medical Diagnosis from Referral: M54.2 (ICD-10-CM) - Cervicalgia   Evaluation Date: 9/18/2023  Authorization Period Expiration: 12/31/2023  Plan of Care Expiration: 12/30/2023                          Progress Update: 10/18/2023                          Visit # / Visits authorized: 7 / 20 (POC 6 / 12)                            PRECAUTIONS: Standard Precautions, Hard of hearing      Time In: 1130  Time Out: 1214  Total Appointment Time: 44 minutes    Subjective     Patient reports: he is feeling good. Was asked if he wanted a shot in his spine but does not want to do that since he is feeling good.     He was compliant with home exercise program.  Response to previous treatment: no complaints  Functional change: none reported    Pain: 1-2/10  Location: bilateral neck, >L      Objective      Objective Measures updated at progress report unless specified.     Treatment     Bernardo received the treatments listed below:      therapeutic exercises to develop strength, endurance, ROM, and flexibility for 14 minutes including:  neuromuscular re-education activities to improve: Posture for 30 minutes.    Upper Body Ergometer 2/2 - NOT PERFORMED      SEATED: NO arms on chair  Manual Hot Pack over Upper Trapezius, 5 minutes - NOT PERFORMED   Shoulder shrugs  x 10 reps   Retro shoulder rolls 2 x 10 reps   Upper Trapezius stretch, arms by side, 2x15 sec - LEFT last  Scapular squeezes w Bilateral External Rotation 20x reps   Cervical range of motion x 10 reps each direction  Thoracic extension on 1/2 foam  roll, hands behind head, 10x reps     STAND:  Open books  10x bilateral   Doorway stretch 3 x 15 seconds bilateral       therapeutic activities to improve functional performance for 9 minutes  Rows with red theraband 3 x 10 reps   Shoulder extension with red theraband 3 x 10 reps      Add NEXT:  Manual tx     Patient Education and Home Exercises       Education provided:   -apply ice as needed for delayed muscle soreness  -Continue with Home exercise program daily     Written Home Exercises Provided: yes. Exercises were reviewed and Bernardo was able to demonstrate them prior to the end of the session.  Bernardo demonstrated good  understanding of the education provided. See Electronic Medical Record under Patient Instructions for exercises provided during therapy sessions    Assessment     Pt demonstrates low levels of pain, Upper Trapezius guarding and kyphotic posture. Pt able to perform all therex given with minimal pn provocation. Requires mod to max cueing for keeping track of reps. Continue to focus on posture and periscapular strengthening and progress as tolerated.     Bernardo Is progressing well towards his goals.   Patient prognosis is Fair.     Patient will continue to benefit from skilled outpatient physical therapy to address the deficits listed in the problem list box on initial evaluation, provide pt/family education and to maximize pt's level of independence in the home and community environment.     Patient's spiritual, cultural and educational needs considered and pt agreeable to plan of care and goals.     Anticipated barriers to physical therapy: none    Goals:   SHORT TERM GOALS: 4 weeks PROGRESS 10/18/2023    Recent signs and systems trend is improving in order to progress towards LTG's.     Patient will be independent with HEP in order to further progress and return to maximal function.     Pain rating at Worst: 5/10 in order to progress towards increased independence with activity.      Patient will be able to correct postural deviations in sitting and standing, to decrease pain and promote postural awareness for injury prevention.         LONG TERM GOALS: 8 weeks    Patient will return to normal ADL, recreational, and work related activities with less pain and limitation.      Patient will improve AROM to stated goals in order to return to maximal functional potential.      Patient will improve Strength to stated goals of appropriate musculature in order to improve functional independence.      Pain Rating at Best: 1/10 to improve Quality of Life.      Patient will meet predicted functional outcome (FOTO) score: 70% to increase self-worth & perceived functional ability.     Patient will have met/partially met personal goal of being able to perform yard work with no pain.           PLAN     Continue PT as deemed per POC:  shoulder stabilization, cervical mobility    Plan of care Certification: 9/18/2023 to 12/30/2023     Outpatient Physical Therapy 2 times weekly for 6 weeks to include any combination of the following interventions: virtual visits, dry needling, modalities, electrical stimulation (IFC, Pre-Mod, Attended with Functional Dry Needling), Manual Therapy, Moist Heat/ Ice, Neuromuscular Re-ed, Patient Education, Self Care, Therapeutic Exercise, Functional Training, and Therapeutic Activites        Becca Mir, PT

## 2023-10-20 ENCOUNTER — CLINICAL SUPPORT (OUTPATIENT)
Dept: REHABILITATION | Facility: HOSPITAL | Age: 88
End: 2023-10-20
Payer: MEDICARE

## 2023-10-20 DIAGNOSIS — M25.60 DECREASED RANGE OF MOTION: ICD-10-CM

## 2023-10-20 DIAGNOSIS — R29.3 ABNORMAL POSTURE: ICD-10-CM

## 2023-10-20 DIAGNOSIS — M62.89 MUSCLE TIGHTNESS: ICD-10-CM

## 2023-10-20 DIAGNOSIS — R53.1 DECREASED STRENGTH: Primary | ICD-10-CM

## 2023-10-20 PROCEDURE — 97110 THERAPEUTIC EXERCISES: CPT | Mod: PO

## 2023-10-20 PROCEDURE — 97140 MANUAL THERAPY 1/> REGIONS: CPT | Mod: PO

## 2023-10-20 PROCEDURE — 97112 NEUROMUSCULAR REEDUCATION: CPT | Mod: PO

## 2023-10-20 NOTE — PROGRESS NOTES
[facility]   Physical Therapy Treatment Note      Name: Bernardo Oliveira  Clinic Number: [id number]    Therapy Diagnosis:   Encounter Diagnoses   Name Primary?    Decreased strength Yes    Muscle tightness     Decreased range of motion     Abnormal posture      Physician: Dominick Hernandez MD    Visit Date: 10/20/2023    Physician: Dominick Hernandez MD    Physician Orders: PT Eval and Treat  Medical Diagnosis from Referral: M54.2 (ICD-10-CM) - Cervicalgia   Evaluation Date: 9/18/2023  Authorization Period Expiration: 12/31/2023  Plan of Care Expiration: 12/30/2023                          Progress Update: 10/18/2023                          Visit # / Visits authorized: 8 / 20 (POC 7 / 12)                            PRECAUTIONS: Standard Precautions, Hard of hearing      Time In: 1130  Time Out: 1214  Total Appointment Time: 44 minutes    Subjective     Patient reports: he is feeling good. Has a little burning in his right Upper Trapezius.     He was compliant with home exercise program.  Response to previous treatment: no complaints  Functional change: none reported    Pain: 1/10  Location: bilateral neck, >L      Objective      Objective Measures updated at progress report unless specified.     Treatment     Bernardo received the treatments listed below:      therapeutic exercises to develop strength, endurance, ROM, and flexibility for 14 minutes including:  neuromuscular re-education activities to improve: Posture for 30 minutes.    Upper Body Ergometer 2/2    SEATED: NO arms on chair  Manual Hot Pack over Upper Trapezius, 5 minutes - NOT PERFORMED   Shoulder shrugs  x 10 reps   Retro shoulder rolls 2 x 10 reps   Upper Trapezius stretch, arms by side, 3x15 sec - LEFT last  Red Theraband Scapular squeezes w Bilateral External Rotation 20x reps - 30 next, cue glue your elbows to your ribs   Cervical range of motion x 10 reps each direction  Thoracic extension on 1/2 foam roll, hands  behind head, 10x reps     STAND:  Open books  10x bilateral   Doorway stretch 3 x 15 seconds bilateral - NOT PERFORMED        therapeutic activities to improve functional performance for 10 minutes  Rows with red theraband 3 x 10 reps   Shoulder extension with red theraband 3 x 10 reps      Manual tx: 8 minutes  Myofascial Release and ART to left Upper Trapezius and levator scap      Add NEXT:  Table: Supine wand OH / Hands behind head, pec stretch    Patient Education and Home Exercises       Education provided:   -apply ice as needed for delayed muscle soreness  -Continue with Home exercise program daily     Written Home Exercises Provided: yes. Exercises were reviewed and Bernardo was able to demonstrate them prior to the end of the session.  Bernardo demonstrated good  understanding of the education provided. See Electronic Medical Record under Patient Instructions for exercises provided during therapy sessions    Assessment     Pt demonstrates low levels of pain, Upper Trapezius guarding and kyphotic posture. Pt able to perform all therex given with minimal pn provocation. Requires mod to max cueing for keeping track of reps. Utilized manual tx to decrease Upper Trapezius stiffness and trigger points. Continue to focus on posture and periscapular strengthening and progress as tolerated.     Bernardo Is progressing well towards his goals.   Patient prognosis is Fair.     Patient will continue to benefit from skilled outpatient physical therapy to address the deficits listed in the problem list box on initial evaluation, provide pt/family education and to maximize pt's level of independence in the home and community environment.     Patient's spiritual, cultural and educational needs considered and pt agreeable to plan of care and goals.     Anticipated barriers to physical therapy: none    Goals:   SHORT TERM GOALS: 4 weeks PROGRESS 10/20/2023    Recent signs and systems trend is improving in order to progress  towards LTG's.     Patient will be independent with HEP in order to further progress and return to maximal function.     Pain rating at Worst: 5/10 in order to progress towards increased independence with activity.     Patient will be able to correct postural deviations in sitting and standing, to decrease pain and promote postural awareness for injury prevention.         LONG TERM GOALS: 8 weeks    Patient will return to normal ADL, recreational, and work related activities with less pain and limitation.      Patient will improve AROM to stated goals in order to return to maximal functional potential.      Patient will improve Strength to stated goals of appropriate musculature in order to improve functional independence.      Pain Rating at Best: 1/10 to improve Quality of Life.      Patient will meet predicted functional outcome (FOTO) score: 70% to increase self-worth & perceived functional ability.     Patient will have met/partially met personal goal of being able to perform yard work with no pain.           PLAN     Continue PT as deemed per POC:  shoulder stabilization, cervical mobility    Plan of care Certification: 9/18/2023 to 12/30/2023     Outpatient Physical Therapy 2 times weekly for 6 weeks to include any combination of the following interventions: virtual visits, dry needling, modalities, electrical stimulation (IFC, Pre-Mod, Attended with Functional Dry Needling), Manual Therapy, Moist Heat/ Ice, Neuromuscular Re-ed, Patient Education, Self Care, Therapeutic Exercise, Functional Training, and Therapeutic Activites        Becca Mir, PT

## 2023-10-25 ENCOUNTER — CLINICAL SUPPORT (OUTPATIENT)
Dept: REHABILITATION | Facility: HOSPITAL | Age: 88
End: 2023-10-25
Payer: MEDICARE

## 2023-10-25 DIAGNOSIS — M62.89 MUSCLE TIGHTNESS: ICD-10-CM

## 2023-10-25 DIAGNOSIS — R29.3 ABNORMAL POSTURE: ICD-10-CM

## 2023-10-25 DIAGNOSIS — M25.60 DECREASED RANGE OF MOTION: ICD-10-CM

## 2023-10-25 DIAGNOSIS — R53.1 DECREASED STRENGTH: Primary | ICD-10-CM

## 2023-10-25 PROCEDURE — 97112 NEUROMUSCULAR REEDUCATION: CPT | Mod: PO

## 2023-10-25 PROCEDURE — 97110 THERAPEUTIC EXERCISES: CPT | Mod: PO

## 2023-10-25 PROCEDURE — 97140 MANUAL THERAPY 1/> REGIONS: CPT | Mod: PO

## 2023-10-25 NOTE — PROGRESS NOTES
[facility]   Physical Therapy Treatment Note      Name: Bernardo Oliveira  Clinic Number: [id number]    Therapy Diagnosis:   Encounter Diagnoses   Name Primary?    Decreased strength Yes    Muscle tightness     Decreased range of motion     Abnormal posture      Physician: Dominick Hernandez MD    Visit Date: 10/25/2023    Physician: Dominick Hernandez MD    Physician Orders: PT Eval and Treat  Medical Diagnosis from Referral: M54.2 (ICD-10-CM) - Cervicalgia   Evaluation Date: 9/18/2023  Authorization Period Expiration: 12/31/2023  Plan of Care Expiration: 12/30/2023                          Progress Update: 10/18/2023                          Visit # / Visits authorized: 9 / 20 (POC 8 / 12)                            PRECAUTIONS: Standard Precautions, Hard of hearing      Time In: 1115  Time Out: 1210  Total Appointment Time: 55 minutes    Subjective     Patient reports: he has some stiffness and pain this morning. Discussed using heat in mornings instead of evenings or in addition to the evening.    He was compliant with home exercise program.  Response to previous treatment: no complaints  Functional change: none reported    Pain: 3/10  Location: bilateral neck, >L      Objective      Objective Measures updated at progress report unless specified.     Treatment     Bernardo received the treatments listed below:      therapeutic exercises to develop strength, endurance, ROM, and flexibility for 14 minutes including:  neuromuscular re-education activities to improve: Posture for 30 minutes.    Upper Body Ergometer 2/2    SEATED: NO arms on chair  Manual Hot Pack over Upper Trapezius, 5 minutes   Shoulder shrugs  x 10 reps   Retro shoulder rolls 2 x 10 reps (add to HEP)  Upper Trapezius stretch, arms by side, 3x15 sec - LEFT last  +Mid trap hug stretch, [id number] (add to HEP)  Red Theraband Scapular squeezes w Bilateral External Rotation 20x reps, cue glue your elbows to your ribs    Cervical range of motion x 10 reps each direction  Thoracic extension on 1/2 foam roll, hands behind head, 10x reps (add to HEP)    TABLE:  +Supine wand OH, 3#, 5x, 10sec, cue not to hold breath   +Hands behind head, pec stretch, [id number]    STAND:  Open books  10x bilateral   Doorway stretch 3 x 15 seconds bilateral - NOT PERFORMED        therapeutic activities to improve functional performance for 10 minutes  Rows with red theraband 3 x 10 reps   Shoulder extension with red theraband 3 x 10 reps      Manual tx: 10 minutes  Myofascial Release and ART to left levator scap and mid trap      Add NEXT:    Patient Education and Home Exercises       Education provided:   -apply ice as needed for delayed muscle soreness  -Continue with Home exercise program daily     Written Home Exercises Provided: yes. Exercises were reviewed and Bernardo was able to demonstrate them prior to the end of the session.  Bernardo demonstrated good  understanding of the education provided. See Electronic Medical Record under Patient Instructions for exercises provided during therapy sessions    Assessment     Pt demonstrates low levels of pain, Upper Trapezius guarding and kyphotic posture. Requires mod to max cueing for keeping track of reps. Utilized manual tx to decrease levator scapula and mid Trapezius stiffness and trigger points. Added supine thoracic stretching to improve posture. Continue to focus on posture and periscapular strengthening and progress as tolerated.     Bernardo Is progressing well towards his goals.   Patient prognosis is Fair.     Patient will continue to benefit from skilled outpatient physical therapy to address the deficits listed in the problem list box on initial evaluation, provide pt/family education and to maximize pt's level of independence in the home and community environment.     Patient's spiritual, cultural and educational needs considered and pt agreeable to plan of care and goals.     Anticipated  barriers to physical therapy: none    Goals:   SHORT TERM GOALS: 4 weeks PROGRESS 10/25/2023    Recent signs and systems trend is improving in order to progress towards LTG's.     Patient will be independent with HEP in order to further progress and return to maximal function.     Pain rating at Worst: 5/10 in order to progress towards increased independence with activity.     Patient will be able to correct postural deviations in sitting and standing, to decrease pain and promote postural awareness for injury prevention.         LONG TERM GOALS: 8 weeks    Patient will return to normal ADL, recreational, and work related activities with less pain and limitation.      Patient will improve AROM to stated goals in order to return to maximal functional potential.      Patient will improve Strength to stated goals of appropriate musculature in order to improve functional independence.      Pain Rating at Best: 1/10 to improve Quality of Life.      Patient will meet predicted functional outcome (FOTO) score: 70% to increase self-worth & perceived functional ability.     Patient will have met/partially met personal goal of being able to perform yard work with no pain.           PLAN     Continue PT as deemed per POC:  shoulder stabilization, cervical mobility    Plan of care Certification: 9/18/2023 to 12/30/2023     Outpatient Physical Therapy 2 times weekly for 6 weeks to include any combination of the following interventions: virtual visits, dry needling, modalities, electrical stimulation (IFC, Pre-Mod, Attended with Functional Dry Needling), Manual Therapy, Moist Heat/ Ice, Neuromuscular Re-ed, Patient Education, Self Care, Therapeutic Exercise, Functional Training, and Therapeutic Activites        Becca Mir, PT

## 2023-10-27 ENCOUNTER — CLINICAL SUPPORT (OUTPATIENT)
Dept: REHABILITATION | Facility: HOSPITAL | Age: 88
End: 2023-10-27
Payer: MEDICARE

## 2023-10-27 DIAGNOSIS — R29.3 ABNORMAL POSTURE: ICD-10-CM

## 2023-10-27 DIAGNOSIS — R53.1 DECREASED STRENGTH: Primary | ICD-10-CM

## 2023-10-27 DIAGNOSIS — M25.60 DECREASED RANGE OF MOTION: ICD-10-CM

## 2023-10-27 DIAGNOSIS — M62.89 MUSCLE TIGHTNESS: ICD-10-CM

## 2023-10-27 PROCEDURE — 97112 NEUROMUSCULAR REEDUCATION: CPT | Mod: PO

## 2023-10-27 PROCEDURE — 97110 THERAPEUTIC EXERCISES: CPT | Mod: PO

## 2023-10-27 PROCEDURE — 97530 THERAPEUTIC ACTIVITIES: CPT | Mod: PO

## 2023-10-27 NOTE — PROGRESS NOTES
[facility]   Physical Therapy Treatment Note      Name: Bernardo Oliveira  Clinic Number: [id number]    Therapy Diagnosis:   Encounter Diagnoses   Name Primary?    Decreased strength Yes    Muscle tightness     Decreased range of motion     Abnormal posture      Physician: Dominick Hernandez MD    Visit Date: 10/27/2023    Physician: Dominick Hernandez MD    Physician Orders: PT Eval and Treat  Medical Diagnosis from Referral: M54.2 (ICD-10-CM) - Cervicalgia   Evaluation Date: 9/18/2023  Authorization Period Expiration: 12/31/2023  Plan of Care Expiration: 12/30/2023                          Progress Update: 10/18/2023                          Visit # / Visits authorized: 9 / 20 (POC 9 / 12)                            PRECAUTIONS: Standard Precautions, Hard of hearing      Time In: 1125  Time Out: 1210  Total Appointment Time: 45 minutes    Subjective     Patient reports: he is feeling good. The massage last time helped.    He was compliant with home exercise program.  Response to previous treatment: no complaints  Functional change: none reported    Pain: 0/10  Location: bilateral neck, >L      Objective      Objective Measures updated at progress report unless specified.     Treatment     Bernardo received the treatments listed below:      therapeutic exercises to develop strength, endurance, ROM, and flexibility for 10 minutes including:  neuromuscular re-education activities to improve: Posture for 25 minutes.    Upper Body Ergometer 2/2    SEATED: NO arms on chair  Manual Hot Pack over Upper Trapezius, 5 minutes   Shoulder shrugs  x 10 reps   Retro shoulder rolls 2 x 10 reps (HEP)  Upper Trapezius stretch, arms by side, 3x15 sec - LEFT last (HEP)  Mid trap hug stretch, [id number] (HEP)  Red Theraband Scapular squeezes w Bilateral External Rotation 20x reps, cue glue your elbows to your ribs   Cervical range of motion x 10 reps each direction  Thoracic extension, hands behind head, 10x  reps (HEP)    TABLE:  Supine wand OH, 3#, 5x, 10sec, cue not to hold breath  Supine Red Theraband Horiz Abd, 30x   Hands behind head, pec stretch, [id number]    STAND:  Open books  10x bilateral   Doorway stretch 3 x 15 seconds bilateral - NOT PERFORMED        therapeutic activities to improve functional performance for 10 minutes  Rows with red theraband 3 x 10 reps   Shoulder extension with red theraband 3 x 10 reps      Manual tx: 0 minutes NOT PERFORMED   Myofascial Release and ART to left levator scap and mid trap      Patient Education and Home Exercises       Education provided:   -apply ice as needed for delayed muscle soreness  -Continue with Home exercise program daily     Written Home Exercises Provided: yes. Exercises were reviewed and Bernardo was able to demonstrate them prior to the end of the session.  Bernardo demonstrated good  understanding of the education provided. See Electronic Medical Record under Patient Instructions for exercises provided during therapy sessions    Assessment     Pt demonstrates no pain today, reduced Upper Trapezius guarding and kyphotic posture. Requires mod to max cueing for keeping track of reps. Reviewed supine thoracic stretching to improve posture. Additional resisted horizontal abduction was difficult. Continue to focus on posture and periscapular strengthening and progress as tolerated.     Bernardo Is progressing well towards his goals.   Patient prognosis is Fair.     Patient will continue to benefit from skilled outpatient physical therapy to address the deficits listed in the problem list box on initial evaluation, provide pt/family education and to maximize pt's level of independence in the home and community environment.     Patient's spiritual, cultural and educational needs considered and pt agreeable to plan of care and goals.     Anticipated barriers to physical therapy: none    Goals:   SHORT TERM GOALS: 4 weeks PROGRESS 10/27/2023    Recent signs and  systems trend is improving in order to progress towards LTG's.     Patient will be independent with HEP in order to further progress and return to maximal function.     Pain rating at Worst: 5/10 in order to progress towards increased independence with activity.     Patient will be able to correct postural deviations in sitting and standing, to decrease pain and promote postural awareness for injury prevention.         LONG TERM GOALS: 8 weeks    Patient will return to normal ADL, recreational, and work related activities with less pain and limitation.      Patient will improve AROM to stated goals in order to return to maximal functional potential.      Patient will improve Strength to stated goals of appropriate musculature in order to improve functional independence.      Pain Rating at Best: 1/10 to improve Quality of Life.      Patient will meet predicted functional outcome (FOTO) score: 70% to increase self-worth & perceived functional ability.     Patient will have met/partially met personal goal of being able to perform yard work with no pain.           PLAN     Continue PT as deemed per POC:  shoulder stabilization, cervical mobility    Plan of care Certification: 9/18/2023 to 12/30/2023     Outpatient Physical Therapy 2 times weekly for 6 weeks to include any combination of the following interventions: virtual visits, dry needling, modalities, electrical stimulation (IFC, Pre-Mod, Attended with Functional Dry Needling), Manual Therapy, Moist Heat/ Ice, Neuromuscular Re-ed, Patient Education, Self Care, Therapeutic Exercise, Functional Training, and Therapeutic Activites        Becca Mir, PT

## 2023-11-15 ENCOUNTER — CLINICAL SUPPORT (OUTPATIENT)
Dept: REHABILITATION | Facility: HOSPITAL | Age: 88
End: 2023-11-15
Payer: MEDICARE

## 2023-11-15 DIAGNOSIS — R29.3 ABNORMAL POSTURE: ICD-10-CM

## 2023-11-15 DIAGNOSIS — R53.1 DECREASED STRENGTH: Primary | ICD-10-CM

## 2023-11-15 DIAGNOSIS — M25.60 DECREASED RANGE OF MOTION: ICD-10-CM

## 2023-11-15 DIAGNOSIS — M62.89 MUSCLE TIGHTNESS: ICD-10-CM

## 2023-11-15 PROCEDURE — 97530 THERAPEUTIC ACTIVITIES: CPT | Mod: PO

## 2023-11-15 PROCEDURE — 97112 NEUROMUSCULAR REEDUCATION: CPT | Mod: PO

## 2023-11-15 PROCEDURE — 97110 THERAPEUTIC EXERCISES: CPT | Mod: PO

## 2023-11-15 NOTE — PROGRESS NOTES
[facility]   Physical Therapy Treatment Note      Name: Bernardo Oliveira  Clinic Number: [id number]    Therapy Diagnosis:   Encounter Diagnoses   Name Primary?    Decreased strength Yes    Muscle tightness     Decreased range of motion     Abnormal posture      Physician: Dominick Hernandez MD    Visit Date: 11/15/2023    Physician: Dominick Hernandez MD    Physician Orders: PT Eval and Treat  Medical Diagnosis from Referral: M54.2 (ICD-10-CM) - Cervicalgia   Evaluation Date: 9/18/2023  Authorization Period Expiration: 12/31/2023  Plan of Care Expiration: 12/30/2023                          Progress Update: 10/18/2023                          Visit # / Visits authorized: 11 / eval + 20 (POC 10 / 12)                            PRECAUTIONS: Standard Precautions, Hard of hearing      Time In: 1045  Time Out: 1128  Total Appointment Time: 43 minutes    Subjective     Patient reports: he is feeling good. He and wife feel like today could be his last day.     He was compliant with home exercise program.  Response to previous treatment: no complaints  Functional change: none reported    Pain: 0/10  Location: bilateral neck, >L      Objective      Objective Measures updated at progress report unless specified.     Treatment     Bernardo received the treatments listed below:      therapeutic exercises to develop strength, endurance, ROM, and flexibility for 10 minutes including:  neuromuscular re-education activities to improve: Posture for 23 minutes.    Upper Body Ergometer 3/3    SEATED: NO arms on chair  Manual Hot Pack over Upper Trapezius, 5 minutes - NOT PERFORMED   Shoulder shrugs  x 10 reps   Retro shoulder rolls x 10 reps (HEP)  Upper Trapezius stretch, arms by side, 3x15 sec - LEFT last (HEP)  Mid trap hug stretch, [id number] (HEP)  Red Theraband Scapular squeezes w Bilateral External Rotation 20x reps, cue glue your elbows to your ribs   Cervical range of motion x 10 reps each  direction  Thoracic extension, hands behind head, 10x reps (HEP)    TABLE:  Supine wand OH, 3#, 5x, 10sec, cue not to hold breath  Supine Red Theraband Horiz Abd, 30x   Hands behind head, pec stretch, [id number]    STAND: NOT PERFORMED   Open books  10x bilateral   Doorway stretch 3 x 15 seconds bilateral         therapeutic activities to improve functional performance for 10 minutes  Rows with red theraband 3 x 10 reps   Shoulder extension with red theraband 3 x 10 reps      Manual tx: 0 minutes NOT PERFORMED   Myofascial Release and ART to left levator scap and mid trap      Patient Education and Home Exercises       Education provided:   -apply ice as needed for delayed muscle soreness  -Continue with Home exercise program daily     Written Home Exercises Provided: yes. Exercises were reviewed and Bernardo was able to demonstrate them prior to the end of the session.  Bernardo demonstrated good  understanding of the education provided. See Electronic Medical Record under Patient Instructions for exercises provided during therapy sessions    Assessment     Pt demonstrates no pain today, reduced Upper Trapezius guarding and kyphotic posture. Required min cueing for keeping track of reps. Discussed today being pt last day due to pn resolving. We are keeping his POC open in case he has a flare-up and needs to return prior to expiration. Continue to focus on posture and periscapular strengthening and progress as tolerated.     Bernardo Is progressing well towards his goals.   Patient prognosis is Fair.     Patient will continue to benefit from skilled outpatient physical therapy to address the deficits listed in the problem list box on initial evaluation, provide pt/family education and to maximize pt's level of independence in the home and community environment.     Patient's spiritual, cultural and educational needs considered and pt agreeable to plan of care and goals.     Anticipated barriers to physical therapy:  none    Goals:   SHORT TERM GOALS: 4 weeks PROGRESS 11/15/2023    Recent signs and systems trend is improving in order to progress towards LTG's.     Patient will be independent with HEP in order to further progress and return to maximal function.     Pain rating at Worst: 5/10 in order to progress towards increased independence with activity.     Patient will be able to correct postural deviations in sitting and standing, to decrease pain and promote postural awareness for injury prevention.         LONG TERM GOALS: 8 weeks    Patient will return to normal ADL, recreational, and work related activities with less pain and limitation.      Patient will improve AROM to stated goals in order to return to maximal functional potential.      Patient will improve Strength to stated goals of appropriate musculature in order to improve functional independence.      Pain Rating at Best: 1/10 to improve Quality of Life.      Patient will meet predicted functional outcome (FOTO) score: 70% to increase self-worth & perceived functional ability.     Patient will have met/partially met personal goal of being able to perform yard work with no pain.           PLAN     Continue PT as deemed per POC:  shoulder stabilization, cervical mobility    Plan of care Certification: 9/18/2023 to 12/30/2023     Outpatient Physical Therapy 2 times weekly for 6 weeks to include any combination of the following interventions: virtual visits, dry needling, modalities, electrical stimulation (IFC, Pre-Mod, Attended with Functional Dry Needling), Manual Therapy, Moist Heat/ Ice, Neuromuscular Re-ed, Patient Education, Self Care, Therapeutic Exercise, Functional Training, and Therapeutic Activites        Becca Mir, PT

## 2023-11-16 NOTE — PROGRESS NOTES
[facility] [place]  9 16 20     Cc bph     Age 85, comes in for yearly check. His voiding is without impediment. Denies having intermittency or needing to push to void. Takes no medications for his prostate. Nocturia x 2. No burning on urination and no pains..     Had turp by dr ryan at [facility] in 2004; did fine.          PMH:   SURGICAL: Tonsillectomy, TURP, hernia repair, eye surgery x4.   MEDICAL: Peptic ulcers, high cholesterol.   FAMILIAL: No family history of prostate cancer.   SOCIAL: The patient lives in [place], retired, born in [place] but was raised in [place], so speaks very little [place];  2008 in hospitals, nonsmoker.   ALLERGIES: NKDA.                  Current Outpatient Prescriptions on File Prior to Visit   Medication Sig Dispense Refill    aspirin (ECOTRIN) 81 MG EC tablet Take 1 tablet by mouth as needed.         atorvastatin (LIPITOR)  Take 1 tablet by mouth.        ibuprofen (ADVIL,MOTRIN) 400 MG tablet Take 1 tablet (400 mg total) by mouth every 6 ( 20 tablet 0    metoprolol succinate (TOPROL-XL) 25 MG 24 hr ta 1 tablet Daily.               ROS:  Denies malaise, rare headaches, needs glasses to see, no difficulty swallowing or breathing problems.   No chest pains or palpitations.   No change in bowel habits, no tarry stools or hematochezia. Has acid reflux.  No genital lesions.  No bleeding disorders, no seizures.  Psych normal mood, normal affect        Pt alert, oriented, no distress  HEENT: wnl.  Neck: supple, no JVD, no lymphadenopathy  Chest: CV NSR  Lungs: normal chest expansion  ABDOMEN: Flat. No masses. CVAs are negative. No organomegaly or   tenderness.   Penis circumcised. Meatus normal. Testes normal.   Prostate 30 gm, symmetric, benign.   Extremities: no edema, peripheral pulses nl  Neuro: preserved     PSA 1.5 in may 2019     IMPRESSION:   bph s/p turp, now on observation  Nocturia  PLAN: RTC 1 year. I will update the psa today at pt's request.        alert

## 2024-02-15 ENCOUNTER — OFFICE VISIT (OUTPATIENT)
Dept: PULMONOLOGY | Facility: CLINIC | Age: 89
End: 2024-02-15
Payer: MEDICARE

## 2024-02-15 VITALS
OXYGEN SATURATION: 98 % | SYSTOLIC BLOOD PRESSURE: 130 MMHG | BODY MASS INDEX: 23.93 KG/M2 | WEIGHT: 157.38 LBS | DIASTOLIC BLOOD PRESSURE: 72 MMHG | HEART RATE: 71 BPM

## 2024-02-15 DIAGNOSIS — Z77.090 ASBESTOS EXPOSURE: ICD-10-CM

## 2024-02-15 DIAGNOSIS — R05.9 COUGH, UNSPECIFIED TYPE: ICD-10-CM

## 2024-02-15 DIAGNOSIS — J92.0 ASBESTOS-INDUCED PLEURAL PLAQUE: Primary | ICD-10-CM

## 2024-02-15 PROCEDURE — 1125F AMNT PAIN NOTED PAIN PRSNT: CPT | Mod: CPTII,S$GLB,, | Performed by: INTERNAL MEDICINE

## 2024-02-15 PROCEDURE — 1101F PT FALLS ASSESS-DOCD LE1/YR: CPT | Mod: CPTII,S$GLB,, | Performed by: INTERNAL MEDICINE

## 2024-02-15 PROCEDURE — 99213 OFFICE O/P EST LOW 20 MIN: CPT | Mod: S$GLB,,, | Performed by: INTERNAL MEDICINE

## 2024-02-15 PROCEDURE — 1160F RVW MEDS BY RX/DR IN RCRD: CPT | Mod: CPTII,S$GLB,, | Performed by: INTERNAL MEDICINE

## 2024-02-15 PROCEDURE — 1159F MED LIST DOCD IN RCRD: CPT | Mod: CPTII,S$GLB,, | Performed by: INTERNAL MEDICINE

## 2024-02-15 PROCEDURE — 3288F FALL RISK ASSESSMENT DOCD: CPT | Mod: CPTII,S$GLB,, | Performed by: INTERNAL MEDICINE

## 2024-02-15 RX ORDER — MELOXICAM 7.5 MG/1
7.5 TABLET ORAL
COMMUNITY
Start: 2023-09-06

## 2024-02-15 RX ORDER — FLUOXETINE 10 MG/1
CAPSULE ORAL
COMMUNITY
Start: 2023-11-06

## 2024-02-15 RX ORDER — GABAPENTIN 100 MG/1
CAPSULE ORAL
COMMUNITY
Start: 2023-11-09

## 2024-02-15 RX ORDER — MONTELUKAST SODIUM 10 MG/1
10 TABLET ORAL
COMMUNITY
Start: 2023-12-07

## 2024-02-15 NOTE — PROGRESS NOTES
Office Visit    Patient Name: Bernardo Oliveira  MRN: [id number]  : 1935      Reason for visit: Abnormal CXR    HPI:     2018 - 81 yo male was in Urgent Care with bronchitis had CXR showig a lung nodule.  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  We reviewed old films in [facility] - CXR in  looks unchanged ad that report states that it is unchanged since  (making this stable for about 11 years).  He has no symptoms.  He did smoke (probably < 20 pack years and quit > 40 years ago).  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    2019 - Here for follow up, stable, no new respiratory complaints.  Has not been in ER or hospitalized.  Had a sinus infection (or possibly the flu) in January - better now.    2020 - Here for follow up, has done well over the last year - no hospitalizations, no new diagnoses.  No respiratory symptoms - he remains active (mowing the lawn).  Did travel to [place] - no known exposure to corona virus.    2021 - Here for follow up, no new respiratory symptoms except for occasional allergy (postnasal drip, throat congestion).  No recent hospitalizations and no new diagnoses.  Patient has no known corona virus exposures and has been practicing social distancing.  We have discussed the virus and precautions and all questions have been answered.  Reports better BP control at home and has had issues with increased BP at MD visit.    2022 - Here for follow up, doing well with no new complaints.  No hospitalizations.  No falls or injuries.  Patient has no known corona virus exposures and has been practicing social distancing.  We have discussed the virus and precautions and all questions have been answered.    2/15/2023 - Here for follow up, doing well and no new complaints.  Has some chornic dry cough as well as some post nasal drip and we discussed this.  No ER  visits or hospitalizations.  He remains active.  Patient has no known corona virus exposures and has been practicing social distancing.  We have discussed the virus and precautions and all questions have been answered.    2/15/2024 - Here for follow up, breathing has been OK, no new respiratory symptoms.  He has had no hospitalizations, has had some issues with neck pain.  We discussed vaccines and he is current by his report.  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    Depression     GERD (gastroesophageal reflux disease)     Hyperlipidemia     Hypertension     Vocal cord cancer        Past Surgical History    Past Surgical History:   Procedure Laterality Date    CATARACT EXTRACTION, BILATERAL      CHOLECYSTECTOMY      CIRCUMCISION      INGUINAL HERNIA REPAIR Right 2000    INGUINAL HERNIA REPAIR Left 1995    RETINAL DETACHMENT SURGERY      THROAT SURGERY      X 2 for vocal cord cancer    TONSILLECTOMY      TRANSURETHRAL RESECTION OF PROSTATE  2004    dr cindi ryan - [facility]       Medications      Current Outpatient Medications:     atorvastatin (LIPITOR) 40 MG tablet, TAKE 1 TABLET EVERY DAY, Disp: 90 tablet, Rfl: 3    augmented betamethasone dipropionate (DIPROLENE-AF) 0.05 % cream, , Disp: , Rfl:     FLUoxetine 10 MG capsule, , Disp: , Rfl:     gabapentin (NEURONTIN) 100 MG capsule, TAKE 1 CAPSULE BY MOUTH TWICE DAILY FOR NERVE PAIN FOR 90 DAYS, Disp: , Rfl:     meloxicam (MOBIC) 7.5 MG tablet, Take 7.5 mg by mouth., Disp: , Rfl:     metoprolol succinate (TOPROL-XL) 25 MG 24 hr tablet, TAKE 1 TABLET EVERY DAY, Disp: 90 tablet, Rfl: 1    montelukast (SINGULAIR) 10 mg tablet, Take 10 mg by mouth., Disp: , Rfl:     nabumetone (RELAFEN) 500 MG tablet, Take 1 tablet (500 mg total) by mouth 2 (two) times daily as needed for Pain (shoulder  pain)., Disp: 20 tablet, Rfl: 1    pantoprazole (PROTONIX) 40 MG tablet, TAKE 1 TABLET EVERY MORNING, Disp: 90 tablet, Rfl: 1    valsartan (DIOVAN) 160 MG tablet, TAKE 1 TABLET EVERY DAY, Disp: 90 tablet, Rfl: 3    ALPRAZolam (XANAX) 0.25 MG tablet, Take 1 tablet (0.25 mg total) by mouth nightly as needed for Anxiety (Take it as needed and not to exceed more than 1 pill a day as needed for anxiety and blood pressure elevation)., Disp: 10 tablet, Rfl: 0    Allergies    Review of patient's allergies indicates:   Allergen Reactions    Codeine        SocHx    Social History     Tobacco Use   Smoking Status Former    Current packs/day: 0.00    Types: Cigarettes    Quit date: 1982    Years since quittin.7   Smokeless Tobacco Never       Social History     Substance and Sexual Activity   Alcohol Use Not Currently       Drug Use - no  Occupation - retired, worked as  on [employer]  Asbestos exposure - +    Review of Systems  Review of Systems   Constitutional:  Negative for chills, diaphoresis, fever, malaise/fatigue and weight loss.   HENT:  Negative for congestion.    Eyes:  Negative for pain.   Respiratory:  Negative for cough, hemoptysis, sputum production, shortness of breath, wheezing and stridor.    Cardiovascular:  Negative for chest pain, palpitations, orthopnea, claudication, leg swelling and PND.   Gastrointestinal:  Negative for abdominal pain, constipation, diarrhea, heartburn, nausea and vomiting.   Genitourinary:  Negative for dysuria, frequency and urgency.   Musculoskeletal:  Negative for falls and myalgias.   Neurological:  Negative for sensory change, focal weakness and weakness.   Psychiatric/Behavioral:  Negative for depression. The patient is not nervous/anxious.        Physical Exam    Vitals:    02/15/24 0932   BP: 130/72   BP Location: Left arm   Patient Position: Sitting   BP Method: Medium (Manual)   Pulse: 71   SpO2: 98%   Weight: 71.4 kg (157 lb 6.4 oz)       Physical  Exam  Vitals and nursing note reviewed.   Constitutional:       General: He is not in acute distress.     Appearance: He is well-developed. He is not diaphoretic.   HENT:      Head: Normocephalic and atraumatic.      Right Ear: External ear normal.      Left Ear: External ear normal.      Nose: Nose normal.   Eyes:      Pupils: Pupils are equal, round, and reactive to light.   Neck:      Thyroid: No thyromegaly.      Vascular: No JVD.      Trachea: No tracheal deviation.   Cardiovascular:      Rate and Rhythm: Normal rate and regular rhythm.      Heart sounds: Murmur (1/6) heard.      No friction rub. No gallop.   Pulmonary:      Effort: Pulmonary effort is normal. No respiratory distress.      Breath sounds: Normal breath sounds. No stridor. No wheezing or rales.   Chest:      Chest wall: No tenderness.   Abdominal:      General: Bowel sounds are normal. There is no distension.      Palpations: Abdomen is soft.   Musculoskeletal:         General: No tenderness. Normal range of motion.      Cervical back: Normal range of motion and neck supple.   Lymphadenopathy:      Cervical: No cervical adenopathy.   Neurological:      Mental Status: He is alert and oriented to person, place, and time.      Cranial Nerves: No cranial nerve deficit.      Deep Tendon Reflexes: Reflexes are normal and symmetric.   Psychiatric:         Behavior: Behavior normal.         Labs    Lab Results   Component Value Date    WBC 5.88 08/19/2023    HGB 13.2 (L) 08/19/2023    HCT 40.4 08/19/2023     (L) 08/19/2023       Sodium   Date Value Ref Range Status   08/19/2023 140 136 - 145 mmol/L Final   02/15/2019 140 134 - 144 mmol/L      Potassium   Date Value Ref Range Status   08/19/2023 4.3 3.5 - 5.1 mmol/L Final     Chloride   Date Value Ref Range Status   08/19/2023 108 95 - 110 mmol/L Final   02/15/2019 105 98 - 110 mmol/L      CO2   Date Value Ref Range Status   08/19/2023 28 23 - 29 mmol/L Final     Glucose   Date Value Ref Range  Status   08/19/2023 74 70 - 110 mg/dL Final   02/15/2019 98 70 - 99 mg/dL      BUN   Date Value Ref Range Status   08/19/2023 15 8 - 23 mg/dL Final     Creatinine   Date Value Ref Range Status   08/19/2023 1.1 0.5 - 1.4 mg/dL Final   02/15/2019 0.98 0.60 - 1.40 mg/dL    05/28/2012 0.8 0.2 - 1.4 mg/dl Final     Calcium   Date Value Ref Range Status   08/19/2023 8.8 8.7 - 10.5 mg/dL Final   05/28/2012 9.3 8.6 - 10.2 mg/dl Final     Total Protein   Date Value Ref Range Status   08/19/2023 6.6 6.0 - 8.4 g/dL Final     Albumin   Date Value Ref Range Status   08/19/2023 3.8 3.5 - 5.2 g/dL Final   02/15/2019 3.9 3.1 - 4.7 g/dL      Total Bilirubin   Date Value Ref Range Status   08/19/2023 0.8 0.1 - 1.0 mg/dL Final     Comment:     For infants and newborns, interpretation of results should be based  on gestational age, weight and in agreement with clinical  observations.    Premature Infant recommended reference ranges:  Up to 24 hours.............<8.0 mg/dL  Up to 48 hours............<12.0 mg/dL  3-5 days..................<15.0 mg/dL  6-29 days.................<15.0 mg/dL       Alkaline Phosphatase   Date Value Ref Range Status   08/19/2023 50 (L) 55 - 135 U/L Final   05/28/2012 64 23 - 119 UNIT/L Final     AST   Date Value Ref Range Status   08/19/2023 22 10 - 40 U/L Final   05/28/2012 17 10 - 34 UNIT/L Final     ALT   Date Value Ref Range Status   08/19/2023 28 10 - 44 U/L Final     Anion Gap   Date Value Ref Range Status   08/19/2023 4 (L) 8 - 16 mmol/L Final   05/28/2012 7 5 - 15 meq/L Final       Xrays    CT OF THE CHEST WITHOUT  INTRAVENOUS CONTRAST    CLINICAL INFORMATION: Abnormal chest x-ray.    COMPARISON STUDIES: Chest x-rays dated 2/12/2019, 8/27/2015 and 2/11/2013.    FINDINGS : There are minimal arteriosclerotic changes in the aorta without  aneurysm.    No mediastinal mass are lymphadenopathy noted.    Tracheobronchial tree, heart and pericardium are normal.    There are several small pleural calcified  plaques anteriorly in the right upper  lobe, some of which were seen on the chest x-ray and account for the chest  x-ray findings. No distinct mass is identified.    Lungs are clear with no lung nodule, mass, groundglass or airspace opacities.    There is no pleural effusion.    The pleura in the rest of the chest is normal.    No osseous abnormality seen.    There are multiple benign cortical and peripelvic right renal cyst in the  visualized portion of the right upper kidney.        IMPRESSION:    1. Opacity seen on recent chest x-ray represents calcified pleural plaque.  The calcifications were also seen on previous chest x-rays dating back to  2/11/2013. It may have been slightly more prominently seen on the current chest  x-ray due to slight enlargement of the soft tissue component of the over the  past several years and perhaps also due to technical factors, such as x-ray  exposure factors and differences in positioning of patient.  This may be related to asbestos exposure if applicable.    2. No other significant findings.    Read and electronically signed by: Abhinav Parsons MD on 2/15/2019 9:05 AM CST    Impression/Plan    Problem List Items Addressed This Visit          Other    Abnormal CXR  - has been stable since at least 2007  - no further workup needed      Asbestos exposure/pleural plaques  - probably minimal  - should get yearly CXR  - RTC 1 year                  Humberto Gipson MD

## 2024-08-28 ENCOUNTER — OFFICE VISIT (OUTPATIENT)
Dept: SPINE | Facility: CLINIC | Age: 89
End: 2024-08-28
Payer: MEDICARE

## 2024-08-28 VITALS — WEIGHT: 157.44 LBS | BODY MASS INDEX: 23.86 KG/M2 | HEIGHT: 68 IN

## 2024-08-28 DIAGNOSIS — M54.42 CHRONIC BILATERAL LOW BACK PAIN WITH LEFT-SIDED SCIATICA: Primary | ICD-10-CM

## 2024-08-28 DIAGNOSIS — G89.29 CHRONIC BILATERAL LOW BACK PAIN WITH LEFT-SIDED SCIATICA: Primary | ICD-10-CM

## 2024-08-28 DIAGNOSIS — M54.9 DORSALGIA, UNSPECIFIED: ICD-10-CM

## 2024-08-28 PROCEDURE — 1125F AMNT PAIN NOTED PAIN PRSNT: CPT | Mod: CPTII,S$GLB,, | Performed by: PHYSICAL MEDICINE & REHABILITATION

## 2024-08-28 PROCEDURE — 99999 PR PBB SHADOW E&M-EST. PATIENT-LVL III: CPT | Mod: PBBFAC,,, | Performed by: PHYSICAL MEDICINE & REHABILITATION

## 2024-08-28 PROCEDURE — 1160F RVW MEDS BY RX/DR IN RCRD: CPT | Mod: CPTII,S$GLB,, | Performed by: PHYSICAL MEDICINE & REHABILITATION

## 2024-08-28 PROCEDURE — 3288F FALL RISK ASSESSMENT DOCD: CPT | Mod: CPTII,S$GLB,, | Performed by: PHYSICAL MEDICINE & REHABILITATION

## 2024-08-28 PROCEDURE — 99213 OFFICE O/P EST LOW 20 MIN: CPT | Mod: S$GLB,,, | Performed by: PHYSICAL MEDICINE & REHABILITATION

## 2024-08-28 PROCEDURE — 1159F MED LIST DOCD IN RCRD: CPT | Mod: CPTII,S$GLB,, | Performed by: PHYSICAL MEDICINE & REHABILITATION

## 2024-08-28 PROCEDURE — 1101F PT FALLS ASSESS-DOCD LE1/YR: CPT | Mod: CPTII,S$GLB,, | Performed by: PHYSICAL MEDICINE & REHABILITATION

## 2024-08-28 NOTE — PROGRESS NOTES
SUBJECTIVE:    Patient ID: Bernardo Oliveira is a 88 y.o. male.    Chief Complaint: Low-back Pain    This is an 88-year-old man I saw for the 1st and only time in August of last year with complaints of neck pain.  He went to physical therapy with good results.  I have not seen him since then.  He presents to me now with chronic complaints of left greater than right-sided low back pain at the lumbosacral junction that radiates into the anterior portion of the left leg to the knee.  He has never had any specific treatment for this issue.  Denies bowel or bladder dysfunction fever chills sweats or unexpected weight loss.  Pain level is 8/10 and interferes with his quality of life in terms of activities of daily living recreation and social activities.  I personally reviewed an MRI of the lumbar spine done in  which shows mild-to-moderate expected degenerative changes of the lumbar spine.  He is taking meloxicam and recently prescribed gabapentin which he has not picked up yet.          Past Medical History:   Diagnosis Date    Anxiety     Asbestos-induced pleural plaque 2020    Depression     GERD (gastroesophageal reflux disease)     Hyperlipidemia     Hypertension     Vocal cord cancer      Social History     Socioeconomic History    Marital status:      Spouse name: Yana Oliveira   Occupational History    Occupation: Retired -  on [employer]   Tobacco Use    Smoking status: Former     Current packs/day: 0.00     Types: Cigarettes     Quit date: 1982     Years since quittin.2    Smokeless tobacco: Never   Substance and Sexual Activity    Alcohol use: Not Currently    Drug use: No    Sexual activity: Not Currently     Social Determinants of Health     Financial Resource Strain: Low Risk  (2022)    Overall Financial Resource Strain (CARDIA)     Difficulty of Paying Living Expenses: Not very hard   Food Insecurity: No Food Insecurity (2022)    Hunger Vital Sign      "Worried About Running Out of Food in the Last Year: Never true     Ran Out of Food in the Last Year: Never true   Transportation Needs: No Transportation Needs (11/23/2022)    PRAPARE - Transportation     Lack of Transportation (Medical): No     Lack of Transportation (Non-Medical): No   Physical Activity: Inactive (11/23/2022)    Exercise Vital Sign     Days of Exercise per Week: 0 days     Minutes of Exercise per Session: 0 min   Stress: Stress Concern Present (11/23/2022)    [facility] - Occupational Stress Questionnaire     Feeling of Stress : To some extent   Housing Stability: Low Risk  (11/23/2022)    Housing Stability Vital Sign     Unable to Pay for Housing in the Last Year: No     Number of Places Lived in the Last Year: 1     Unstable Housing in the Last Year: No     Past Surgical History:   Procedure Laterality Date    CATARACT EXTRACTION, BILATERAL      CHOLECYSTECTOMY      CIRCUMCISION      INGUINAL HERNIA REPAIR Right 2000    INGUINAL HERNIA REPAIR Left 1995    RETINAL DETACHMENT SURGERY      THROAT SURGERY      X 2 for vocal cord cancer    TONSILLECTOMY      TRANSURETHRAL RESECTION OF PROSTATE  2004    dr cindi ryan - [facility]     No family history on file.  Vitals:    08/28/24 0843   Weight: 71.4 kg (157 lb 6.5 oz)   Height: 5' 8" (1.727 m)       Review of Systems   Constitutional:  Negative for chills, diaphoresis, fatigue, fever and unexpected weight change.   HENT:  Negative for trouble swallowing.    Eyes:  Negative for visual disturbance.   Respiratory:  Negative for shortness of breath.    Cardiovascular:  Negative for chest pain.   Gastrointestinal:  Negative for abdominal pain, constipation, diarrhea, nausea and vomiting.   Genitourinary:  Negative for difficulty urinating.   Musculoskeletal:  Negative for arthralgias, back pain, gait problem, joint swelling, myalgias, neck pain and neck stiffness.   Neurological:  Negative for dizziness, speech difficulty, " weakness, light-headedness, numbness and headaches.          Objective:      Physical Exam  Neurological:      Mental Status: He is alert and oriented to person, place, and time.      Comments: He is awake and in no acute distress  Mild tenderness to palpation left lumbar paraspinous musculature at the lumbosacral junction with no palpable masses  Forward flexion of the lumbar spine is to about 45° before complaint pain at the lumbosacral junction.  Extension combined with rotation to the right and left causes mild pain at the lumbosacral junction  Reflexes- +1-+2 reflexes at the following:   C5-Biceps   C6-Brachioradialis   C7-Triceps   L3/4-Patellar   S1-Achilles   Jill sign is negative bilaterally  Strength testing- 5/5 strength in the following muscle groups:  C5-Elbow flexion  C6-Wrist extension  C7-Elbow extension  C8-Finger flexion  T1-Finger abduction  L2-Hip flexion  L3-Knee extension  L4-Ankle dorsiflexion  L5-Great toe extension  S1-Ankle plantar flexion                    Assessment:       1. Chronic bilateral low back pain with left-sided sciatica    2. Dorsalgia, unspecified           Plan:     He remains neurologically intact.  I suspect he has chronic low back pain on basis of degenerative disc disease and facet arthropathy.  Has pain with facet loading.  Possible radicular component to the left anterior thigh pain.  No evidence of nerve root dysfunction.  He is frustrated with his ongoing pain that significantly interferes with his quality of life.  I recommend an MRI of the lumbar spine.  He may be a candidate for epidural steroid injections or radiofrequency ablation.  Follow up with me after the scan      Chronic bilateral low back pain with left-sided sciatica    Dorsalgia, unspecified  -     MRI Lumbar Spine Without Contrast; Future; Expected date: 08/28/2024

## 2024-09-06 ENCOUNTER — HOSPITAL ENCOUNTER (OUTPATIENT)
Dept: RADIOLOGY | Facility: HOSPITAL | Age: 89
Discharge: HOME | End: 2024-09-06
Attending: PHYSICAL MEDICINE & REHABILITATION
Payer: MEDICARE

## 2024-09-06 DIAGNOSIS — M54.9 DORSALGIA, UNSPECIFIED: ICD-10-CM

## 2024-09-06 PROCEDURE — 72148 MRI LUMBAR SPINE W/O DYE: CPT | Mod: TC,PO

## 2024-09-06 PROCEDURE — 72148 MRI LUMBAR SPINE W/O DYE: CPT | Mod: 26,,, | Performed by: RADIOLOGY

## 2024-09-08 ENCOUNTER — HOSPITAL ENCOUNTER (EMERGENCY)
Facility: HOSPITAL | Age: 89
Discharge: HOME | End: 2024-09-08
Attending: EMERGENCY MEDICINE
Payer: MEDICARE

## 2024-09-08 VITALS
OXYGEN SATURATION: 97 % | HEART RATE: 61 BPM | SYSTOLIC BLOOD PRESSURE: 167 MMHG | DIASTOLIC BLOOD PRESSURE: 79 MMHG | RESPIRATION RATE: 16 BRPM | BODY MASS INDEX: 23.79 KG/M2 | HEIGHT: 68 IN | WEIGHT: 157 LBS | TEMPERATURE: 98 F

## 2024-09-08 DIAGNOSIS — U07.1 COVID: ICD-10-CM

## 2024-09-08 DIAGNOSIS — B34.9 ACUTE VIRAL SYNDROME: Primary | ICD-10-CM

## 2024-09-08 DIAGNOSIS — R05.9 COUGH: ICD-10-CM

## 2024-09-08 DIAGNOSIS — U07.1 COVID-19 VIRUS DETECTED: ICD-10-CM

## 2024-09-08 LAB
ALBUMIN SERPL BCP-MCNC: 3.6 G/DL (ref 3.5–5.2)
ALP SERPL-CCNC: 57 U/L (ref 55–135)
ALT SERPL W/O P-5'-P-CCNC: 20 U/L (ref 10–44)
ANION GAP SERPL CALC-SCNC: 6 MMOL/L (ref 8–16)
AST SERPL-CCNC: 17 U/L (ref 10–40)
BASOPHILS # BLD AUTO: 0.03 K/UL (ref 0–0.2)
BASOPHILS NFR BLD: 0.5 % (ref 0–1.9)
BILIRUB SERPL-MCNC: 0.5 MG/DL (ref 0.1–1)
BILIRUB UR QL STRIP: NEGATIVE
BUN SERPL-MCNC: 20 MG/DL (ref 8–23)
CALCIUM SERPL-MCNC: 8.4 MG/DL (ref 8.7–10.5)
CHLORIDE SERPL-SCNC: 107 MMOL/L (ref 95–110)
CLARITY UR: CLEAR
CO2 SERPL-SCNC: 25 MMOL/L (ref 23–29)
COLOR UR: YELLOW
CREAT SERPL-MCNC: 1.2 MG/DL (ref 0.5–1.4)
DIFFERENTIAL METHOD BLD: ABNORMAL
EOSINOPHIL # BLD AUTO: 0 K/UL (ref 0–0.5)
EOSINOPHIL NFR BLD: 0.2 % (ref 0–8)
ERYTHROCYTE [DISTWIDTH] IN BLOOD BY AUTOMATED COUNT: 14.1 % (ref 11.5–14.5)
EST. GFR  (NO RACE VARIABLE): 57.8 ML/MIN/1.73 M^2
GLUCOSE SERPL-MCNC: 77 MG/DL (ref 70–110)
GLUCOSE UR QL STRIP: NEGATIVE
GROUP A STREP, MOLECULAR: NEGATIVE
HCT VFR BLD AUTO: 39.7 % (ref 40–54)
HGB BLD-MCNC: 13.2 G/DL (ref 14–18)
HGB UR QL STRIP: NEGATIVE
IMM GRANULOCYTES # BLD AUTO: 0.02 K/UL (ref 0–0.04)
IMM GRANULOCYTES NFR BLD AUTO: 0.4 % (ref 0–0.5)
INFLUENZA A, MOLECULAR: NEGATIVE
INFLUENZA B, MOLECULAR: NEGATIVE
KETONES UR QL STRIP: NEGATIVE
LEUKOCYTE ESTERASE UR QL STRIP: NEGATIVE
LYMPHOCYTES # BLD AUTO: 1.1 K/UL (ref 1–4.8)
LYMPHOCYTES NFR BLD: 19.4 % (ref 18–48)
MCH RBC QN AUTO: 29.7 PG (ref 27–31)
MCHC RBC AUTO-ENTMCNC: 33.2 G/DL (ref 32–36)
MCV RBC AUTO: 89 FL (ref 82–98)
MONOCYTES # BLD AUTO: 0.9 K/UL (ref 0.3–1)
MONOCYTES NFR BLD: 15.8 % (ref 4–15)
NEUTROPHILS # BLD AUTO: 3.5 K/UL (ref 1.8–7.7)
NEUTROPHILS NFR BLD: 63.7 % (ref 38–73)
NITRITE UR QL STRIP: NEGATIVE
NRBC BLD-RTO: 0 /100 WBC
PH UR STRIP: 6 [PH] (ref 5–8)
PLATELET # BLD AUTO: 108 K/UL (ref 150–450)
PMV BLD AUTO: 10.3 FL (ref 9.2–12.9)
POTASSIUM SERPL-SCNC: 4.1 MMOL/L (ref 3.5–5.1)
PROT SERPL-MCNC: 6.2 G/DL (ref 6–8.4)
PROT UR QL STRIP: NEGATIVE
RBC # BLD AUTO: 4.45 M/UL (ref 4.6–6.2)
SARS-COV-2 RDRP RESP QL NAA+PROBE: POSITIVE
SODIUM SERPL-SCNC: 138 MMOL/L (ref 136–145)
SP GR UR STRIP: 1.01 (ref 1–1.03)
SPECIMEN SOURCE: NORMAL
URN SPEC COLLECT METH UR: NORMAL
UROBILINOGEN UR STRIP-ACNC: NEGATIVE EU/DL
WBC # BLD AUTO: 5.46 K/UL (ref 3.9–12.7)

## 2024-09-08 PROCEDURE — 87651 STREP A DNA AMP PROBE: CPT | Performed by: EMERGENCY MEDICINE

## 2024-09-08 PROCEDURE — 96374 THER/PROPH/DIAG INJ IV PUSH: CPT

## 2024-09-08 PROCEDURE — 25000003 PHARM REV CODE 250: Performed by: EMERGENCY MEDICINE

## 2024-09-08 PROCEDURE — 81003 URINALYSIS AUTO W/O SCOPE: CPT | Performed by: EMERGENCY MEDICINE

## 2024-09-08 PROCEDURE — 63600175 PHARM REV CODE 636 W HCPCS: Performed by: EMERGENCY MEDICINE

## 2024-09-08 PROCEDURE — 80053 COMPREHEN METABOLIC PANEL: CPT | Performed by: EMERGENCY MEDICINE

## 2024-09-08 PROCEDURE — 85025 COMPLETE CBC W/AUTO DIFF WBC: CPT | Performed by: EMERGENCY MEDICINE

## 2024-09-08 PROCEDURE — U0002 COVID-19 LAB TEST NON-CDC: HCPCS | Performed by: EMERGENCY MEDICINE

## 2024-09-08 PROCEDURE — 99284 EMERGENCY DEPT VISIT MOD MDM: CPT | Mod: 25

## 2024-09-08 PROCEDURE — 87502 INFLUENZA DNA AMP PROBE: CPT | Performed by: EMERGENCY MEDICINE

## 2024-09-08 RX ORDER — ACETAMINOPHEN 500 MG
1000 TABLET ORAL
Status: COMPLETED | OUTPATIENT
Start: 2024-09-08 | End: 2024-09-08

## 2024-09-08 RX ORDER — DIPHENHYDRAMINE HCL 12.5MG/5ML
25 ELIXIR ORAL NIGHTLY PRN
Qty: 120 ML | Refills: 0 | Status: SHIPPED | OUTPATIENT
Start: 2024-09-08

## 2024-09-08 RX ORDER — DEXAMETHASONE SODIUM PHOSPHATE 4 MG/ML
8 INJECTION, SOLUTION INTRA-ARTICULAR; INTRALESIONAL; INTRAMUSCULAR; INTRAVENOUS; SOFT TISSUE
Status: COMPLETED | OUTPATIENT
Start: 2024-09-08 | End: 2024-09-08

## 2024-09-08 RX ORDER — BENZONATATE 100 MG/1
200 CAPSULE ORAL 3 TIMES DAILY PRN
Qty: 30 CAPSULE | Refills: 1 | Status: SHIPPED | OUTPATIENT
Start: 2024-09-08 | End: 2024-10-08

## 2024-09-08 RX ORDER — BENZONATATE 100 MG/1
100 CAPSULE ORAL 3 TIMES DAILY PRN
Status: DISCONTINUED | OUTPATIENT
Start: 2024-09-08 | End: 2024-09-08 | Stop reason: HOSPADM

## 2024-09-08 RX ADMIN — BENZONATATE 100 MG: 100 CAPSULE ORAL at 01:09

## 2024-09-08 RX ADMIN — ACETAMINOPHEN 1000 MG: 500 TABLET, FILM COATED ORAL at 12:09

## 2024-09-08 RX ADMIN — DEXAMETHASONE SODIUM PHOSPHATE 8 MG: 4 INJECTION INTRA-ARTICULAR; INTRALESIONAL; INTRAMUSCULAR; INTRAVENOUS; SOFT TISSUE at 12:09

## 2024-09-08 NOTE — ED PROVIDER NOTES
Encounter Date: 2024       History     Chief Complaint   Patient presents with    Cough    Chills    Nasal Congestion     X 2 months     89-year-old male presented emergency department with cough and congestion and sore throat and sinus irritation.  Patient had symptoms on and off for a month however over the past 2 days.  Patient had fever and chills and body aches yesterday.      Review of patient's allergies indicates:   Allergen Reactions    Codeine      Past Medical History:   Diagnosis Date    Anxiety     Asbestos-induced pleural plaque 2020    Depression     GERD (gastroesophageal reflux disease)     Hyperlipidemia     Hypertension     Vocal cord cancer      Past Surgical History:   Procedure Laterality Date    CATARACT EXTRACTION, BILATERAL      CHOLECYSTECTOMY      CIRCUMCISION      INGUINAL HERNIA REPAIR Right 2000    INGUINAL HERNIA REPAIR Left     RETINAL DETACHMENT SURGERY      THROAT SURGERY      X 2 for vocal cord cancer    TONSILLECTOMY      TRANSURETHRAL RESECTION OF PROSTATE      dr cindi ryan - [facility]     No family history on file.  Social History     Tobacco Use    Smoking status: Former     Current packs/day: 0.00     Types: Cigarettes     Quit date: 1982     Years since quittin.3    Smokeless tobacco: Never   Substance Use Topics    Alcohol use: Not Currently    Drug use: No     Review of Systems   Constitutional:  Positive for chills, fatigue and fever.   HENT:  Positive for congestion, postnasal drip, rhinorrhea and sore throat.    Eyes: Negative.    Respiratory:  Positive for cough.    Cardiovascular: Negative.    Gastrointestinal: Negative.    Endocrine: Negative.    Genitourinary: Negative.    Musculoskeletal:  Positive for myalgias.   Skin: Negative.    Allergic/Immunologic: Negative.    Neurological: Negative.    Hematological: Negative.    Psychiatric/Behavioral: Negative.     All other systems reviewed and are negative.      Physical Exam     Initial Vitals  [09/08/24 1049]   BP Pulse Resp Temp SpO2   (!) 190/80 80 18 97.9 °F (36.6 °C) 96 %      MAP       --         Physical Exam    Nursing note and vitals reviewed.  Constitutional: He appears well-developed and well-nourished.   HENT:   Head: Normocephalic and atraumatic.   Nose: Nose normal.   Postnasal drip and pharyngeal erythema noted which is mild   Eyes: Conjunctivae and EOM are normal.   Neck: Neck supple. No tracheal deviation present.   Normal range of motion.  Cardiovascular:  Normal rate, regular rhythm, normal heart sounds and intact distal pulses.     Exam reveals no friction rub.       No murmur heard.  Pulmonary/Chest: Breath sounds normal. No respiratory distress. He has no wheezes. He has no rales.   Abdominal: Abdomen is soft. He exhibits no distension. There is no abdominal tenderness.   Musculoskeletal:         General: No tenderness. Normal range of motion.      Cervical back: Normal range of motion and neck supple.     Neurological: He is alert and oriented to person, place, and time. He has normal strength. No cranial nerve deficit or sensory deficit. GCS score is 15. GCS eye subscore is 4. GCS verbal subscore is 5. GCS motor subscore is 6.   Skin: Skin is warm and dry. Capillary refill takes less than 2 seconds.   Psychiatric: He has a normal mood and affect. Thought content normal.         ED Course   Procedures  Labs Reviewed   CBC W/ AUTO DIFFERENTIAL - Abnormal       Result Value    WBC 5.46      RBC 4.45 (*)     Hemoglobin 13.2 (*)     Hematocrit 39.7 (*)     MCV 89      MCH 29.7      MCHC 33.2      RDW 14.1      Platelets 108 (*)     MPV 10.3      Immature Granulocytes 0.4      Gran # (ANC) 3.5      Immature Grans (Abs) 0.02      Lymph # 1.1      Mono # 0.9      Eos # 0.0      Baso # 0.03      nRBC 0      Gran % 63.7      Lymph % 19.4      Mono % 15.8 (*)     Eosinophil % 0.2      Basophil % 0.5      Differential Method Automated     COMPREHENSIVE METABOLIC PANEL - Abnormal    Sodium 138       Potassium 4.1      Chloride 107      CO2 25      Glucose 77      BUN 20      Creatinine 1.2      Calcium 8.4 (*)     Total Protein 6.2      Albumin 3.6      Total Bilirubin 0.5      Alkaline Phosphatase 57      AST 17      ALT 20      eGFR 57.8 (*)     Anion Gap 6 (*)    GROUP A STREP, MOLECULAR   URINALYSIS, REFLEX TO URINE CULTURE    Specimen UA Urine, Clean Catch      Color, UA Yellow      Appearance, UA Clear      pH, UA 6.0      Specific Gravity, UA 1.015      Protein, UA Negative      Glucose, UA Negative      Ketones, UA Negative      Bilirubin (UA) Negative      Occult Blood UA Negative      Nitrite, UA Negative      Urobilinogen, UA Negative      Leukocytes, UA Negative      Narrative:     Specimen Source->Urine   SARS-COV-2 RNA AMPLIFICATION, QUAL   INFLUENZA A AND B ANTIGEN          Imaging Results              X-Ray Chest AP Portable (Final result)  Result time 09/08/24 11:23:30   Procedure changed from X-Ray Chest PA And Lateral     Final result by Elliott Meyer Jr., MD (09/08/24 11:23:30)                   Impression:      Stable appearance of the chest with ill-defined nodular densities projecting in the right midlung zone and right lower lobes without definitive change upon comparison.      Electronically signed by: Elliott Meyer MD  Date:    09/08/2024  Time:    11:23               Narrative:    EXAMINATION:  XR CHEST AP PORTABLE    CLINICAL HISTORY:  cough; Cough, unspecified    TECHNIQUE:  Single frontal view of the chest was performed.    COMPARISON:  08/19/2023    FINDINGS:  The cardiac shadow appears normal in size.  The upper mediastinum is not widened.  The ill-defined nodular density foci some which appear calcified right midlung zone and right lower lung zone are similar to that from previous, although there is overall improved expansion of the lungs.  No evidence of pulmonic infiltrate, pleural effusion, or atelectasis.  Regional skeleton is normal in radiographic appearance.                                     X-Rays:   Independently Interpreted Readings:   Other Readings:  Chest x-ray does not show any acute changes    Medications   benzonatate capsule 100 mg (100 mg Oral Given 9/8/24 1352)   dexAMETHasone injection 8 mg (8 mg Intravenous Given 9/8/24 1249)   acetaminophen tablet 1,000 mg (1,000 mg Oral Given 9/8/24 1250)     Medical Decision Making  89-year-old male presented emergency department with cough and congestion and runny nose and sore throat.  Patient has symptoms consistent with viral syndrome.  Patient did have improvement of symptoms with treatment.  Supportive care and symptomatic treatment as patient nontoxic.  Patient not hypoxic at this time.  One dose of steroid given in the emergency department for treatment of pharyngitis given patient's symptoms.  Patient has main complaint of cough and will treat with antitussive.  Discharged with return precautions and instructions and follow-up.  Supportive care and symptomatic treatment.  Discussed about treatment with Paxlovid however patient agreed to supportive care and symptomatic treatment..  Given patient is vaccinated and immunocompetent and no clear indication for Paxlovid and after discussion with patient and family decided not to treat with Paxlovid and will go with supportive care.  Patient not hypoxic.    Amount and/or Complexity of Data Reviewed  Labs: ordered. Decision-making details documented in ED Course.  Radiology: ordered. Decision-making details documented in ED Course.    Risk  OTC drugs.  Prescription drug management.               ED Course as of 09/08/24 1355   Sun Sep 08, 2024   1306 BP(!): 190/80 [UM]      ED Course User Index  [UM] Nadine Calhoun MD                           Clinical Impression:  Final diagnoses:  [R05.9] Cough  [B34.9] Acute viral syndrome (Primary)  [U07.1] COVID          ED Disposition Condition    Discharge Stable          ED Prescriptions       Medication Sig Dispense Start Date  End Date Auth. Provider    benzonatate (TESSALON PERLES) 100 MG capsule Take 2 capsules (200 mg total) by mouth 3 (three) times daily as needed for Cough. 30 capsule 9/8/2024 10/8/2024 Nadine Calhoun MD    diphenhydrAMINE (BENADRYL) 12.5 mg/5 mL elixir Take 10 mLs (25 mg total) by mouth nightly as needed for Itching or Allergies (Cough). 120 mL 9/8/2024 -- Nadine Calhoun MD          Follow-up Information       Follow up With Specialties Details Why Contact Info    Feli Marin MD Internal Medicine In 1 day  [address] [facility]  [address] [address]  [phone]               Nadine Calhoun MD  09/08/24 4271

## 2024-09-08 NOTE — DISCHARGE INSTRUCTIONS
Drink lots of fluids.  Rest.  Return to emergency department for worsening symptoms or any problems.  Salt water gargling.

## 2024-09-13 ENCOUNTER — NURSE TRIAGE (OUTPATIENT)
Dept: ADMINISTRATIVE | Facility: CLINIC | Age: 89
End: 2024-09-13
Payer: MEDICARE

## 2024-09-13 NOTE — TELEPHONE ENCOUNTER
Pt stated he still feel tired and pain in left shoulder burning, back muscle burning, can't make up his mind. Cough has slowed down. Slight headache. Sob with activity. Care advice recommend pt receives a callback from Md office within 1 hour.   Reason for Disposition   MILD difficulty breathing (e.g., minimal/no SOB at rest, SOB with walking, pulse <100)    Additional Information   Negative: SEVERE difficulty breathing (e.g., struggling for each breath, speaks in single words)   Negative: Difficult to awaken or acting confused (e.g., disoriented, slurred speech)   Negative: Bluish (or gray) lips or face now   Negative: Shock suspected (e.g., cold/pale/clammy skin, too weak to stand, low BP, rapid pulse)   Negative: Sounds like a life-threatening emergency to the triager   Negative: SEVERE or constant chest pain or pressure  (Exception: Mild central chest pain, present only when coughing.)   Negative: MODERATE difficulty breathing (e.g., speaks in phrases, SOB even at rest, pulse 100-120)   Negative: Headache and stiff neck (can't touch chin to chest)   Negative: Oxygen level (e.g., pulse oximetry) 90% or lower   Negative: Chest pain or pressure  (Exception: MILD central chest pain, present only when coughing.)   Negative: Drinking very little and dehydration suspected (e.g., no urine > 12 hours, very dry mouth, very lightheaded)   Negative: Patient sounds very sick or weak to the triager    Protocols used: Coronavirus (COVID-19) Diagnosed or [id number]

## 2024-09-24 DIAGNOSIS — R05.3 CHRONIC COUGH: Primary | ICD-10-CM

## 2024-09-25 ENCOUNTER — OFFICE VISIT (OUTPATIENT)
Dept: SPINE | Facility: CLINIC | Age: 89
End: 2024-09-25
Payer: MEDICARE

## 2024-09-25 ENCOUNTER — TELEPHONE (OUTPATIENT)
Dept: PAIN MEDICINE | Facility: CLINIC | Age: 89
End: 2024-09-25
Payer: MEDICARE

## 2024-09-25 ENCOUNTER — HOSPITAL ENCOUNTER (OUTPATIENT)
Dept: RADIOLOGY | Facility: HOSPITAL | Age: 89
Discharge: HOME | End: 2024-09-25
Attending: INTERNAL MEDICINE
Payer: MEDICARE

## 2024-09-25 VITALS — BODY MASS INDEX: 23.79 KG/M2 | WEIGHT: 156.94 LBS | HEIGHT: 68 IN

## 2024-09-25 DIAGNOSIS — G89.29 CHRONIC BILATERAL LOW BACK PAIN WITH LEFT-SIDED SCIATICA: ICD-10-CM

## 2024-09-25 DIAGNOSIS — R05.3 CHRONIC COUGH: ICD-10-CM

## 2024-09-25 DIAGNOSIS — M47.816 LUMBAR SPONDYLOSIS: Primary | ICD-10-CM

## 2024-09-25 DIAGNOSIS — M54.42 CHRONIC BILATERAL LOW BACK PAIN WITH LEFT-SIDED SCIATICA: ICD-10-CM

## 2024-09-25 DIAGNOSIS — M54.2 CERVICALGIA: Primary | ICD-10-CM

## 2024-09-25 PROCEDURE — 3288F FALL RISK ASSESSMENT DOCD: CPT | Mod: CPTII,S$GLB,, | Performed by: PHYSICAL MEDICINE & REHABILITATION

## 2024-09-25 PROCEDURE — 99213 OFFICE O/P EST LOW 20 MIN: CPT | Mod: S$GLB,,, | Performed by: PHYSICAL MEDICINE & REHABILITATION

## 2024-09-25 PROCEDURE — 1160F RVW MEDS BY RX/DR IN RCRD: CPT | Mod: CPTII,S$GLB,, | Performed by: PHYSICAL MEDICINE & REHABILITATION

## 2024-09-25 PROCEDURE — 71046 X-RAY EXAM CHEST 2 VIEWS: CPT | Mod: TC,PO

## 2024-09-25 PROCEDURE — 1101F PT FALLS ASSESS-DOCD LE1/YR: CPT | Mod: CPTII,S$GLB,, | Performed by: PHYSICAL MEDICINE & REHABILITATION

## 2024-09-25 PROCEDURE — 71046 X-RAY EXAM CHEST 2 VIEWS: CPT | Mod: 26,,, | Performed by: RADIOLOGY

## 2024-09-25 PROCEDURE — 99999 PR PBB SHADOW E&M-EST. PATIENT-LVL III: CPT | Mod: PBBFAC,,, | Performed by: PHYSICAL MEDICINE & REHABILITATION

## 2024-09-25 PROCEDURE — 1125F AMNT PAIN NOTED PAIN PRSNT: CPT | Mod: CPTII,S$GLB,, | Performed by: PHYSICAL MEDICINE & REHABILITATION

## 2024-09-25 PROCEDURE — 1159F MED LIST DOCD IN RCRD: CPT | Mod: CPTII,S$GLB,, | Performed by: PHYSICAL MEDICINE & REHABILITATION

## 2024-09-25 NOTE — H&P (VIEW-ONLY)
SUBJECTIVE:    Patient ID: Bernardo Oliveira is a 89 y.o. male.    Chief Complaint: Follow-up    He is here to review his lumbar MRI done 09/06/2024 to evaluate his complaint of left greater than right-sided low back pain at the lumbosacral junction radiating into the left anterior thigh to the level of the knee with the back pain being more bothersome than the leg pain.  The MRI is summarized below:      FINDINGS:  Alignment: Normal.     Vertebrae: Normal marrow signal. No fracture.     Discs: There is diffuse disc desiccation and disc space narrowing.  There are degenerative endplate changes at T12-L1, L1-2 and L2-3.  There are Schmorl's nodes within the inferior endplate of T11, T12 and L2.     Cord: Normal.  Conus terminates at L1     Degenerative findings:     T12-L1: Broad-based disc bulge and facet hypertrophy resulting in mild foraminal narrowing, right greater than left     L1-L2: Broad-based disc bulge and facet hypertrophy resulting in mild foraminal narrowing     L2-L3: Broad-based disc bulge and facet hypertrophy resulting in mild foraminal narrowing     L3-L4: Broad-based disc bulge facet hypertrophy resulting moderate foraminal narrowing     L4-L5: Broad-based disc bulge, facet hypertrophy and ligamentum flavum hypertrophy resulting in moderate foraminal narrowing right greater than left.     L5-S1: Broad-based disc bulge and facet hypertrophy resulting in moderate foraminal narrowing     Paraspinal muscles & soft tissues: Multiple right renal cyst.  The paraspinous soft tissues are otherwise unremarkable.     Impression:     Multilevel degenerative disc disease and facet hypertrophy resulting in multilevel foraminal narrowing most significant at L4-5 and L5-S1     Multiple right renal cyst      Clinically he is about the same.  His primary complaint is left greater than right-sided low back pain at the lumbosacral junction.  Occasionally gets radiating discomfort into the left greater than right  anterior thighs to the knees and sometimes even extending beyond the knees.  The leg discomfort is a bit nebulous.  He also is complaining of some chronic left-sided neck pain that radiates into the left scapula and into the left upper arm to about the level of elbow.  He actually told me about that when I saw him in August of last year.  He responded to physical therapy back then.  Interlaminar injections at C7-T1 were being considered.  Has known advanced cervical degenerative disc disease.  His pain level is 10/10 and interferes with quality of life in terms of activities of daily living recreation and social activities          Past Medical History:   Diagnosis Date    Anxiety     Asbestos-induced pleural plaque 2020    Depression     GERD (gastroesophageal reflux disease)     Hyperlipidemia     Hypertension     Vocal cord cancer      Social History     Socioeconomic History    Marital status:      Spouse name: Yana Oliveira   Occupational History    Occupation: Retired -  on [email or web address]   Tobacco Use    Smoking status: Former     Current packs/day: 0.00     Types: Cigarettes     Quit date: 1982     Years since quittin.3    Smokeless tobacco: Never   Substance and Sexual Activity    Alcohol use: Not Currently    Drug use: No    Sexual activity: Not Currently     Social Determinants of Health     Financial Resource Strain: Low Risk  (2022)    Overall Financial Resource Strain (CARDIA)     Difficulty of Paying Living Expenses: Not very hard   Food Insecurity: No Food Insecurity (2022)    Hunger Vital Sign     Worried About Running Out of Food in the Last Year: Never true     Ran Out of Food in the Last Year: Never true   Transportation Needs: No Transportation Needs (2022)    PRAPARE - Transportation     Lack of Transportation (Medical): No     Lack of Transportation (Non-Medical): No   Physical Activity: Inactive (2022)    Exercise Vital Sign     Days of  "Exercise per Week: 0 days     Minutes of Exercise per Session: 0 min   Stress: Stress Concern Present (11/23/2022)    [employer] - Occupational Stress Questionnaire     Feeling of Stress : To some extent   Housing Stability: Low Risk  (11/23/2022)    Housing Stability Vital Sign     Unable to Pay for Housing in the Last Year: No     Number of Places Lived in the Last Year: 1     Unstable Housing in the Last Year: No     Past Surgical History:   Procedure Laterality Date    CATARACT EXTRACTION, BILATERAL      CHOLECYSTECTOMY      CIRCUMCISION      INGUINAL HERNIA REPAIR Right 2000    INGUINAL HERNIA REPAIR Left 1995    RETINAL DETACHMENT SURGERY      THROAT SURGERY      X 2 for vocal cord cancer    TONSILLECTOMY      TRANSURETHRAL RESECTION OF PROSTATE  2004    dr cindi ryan - [facility]     No family history on file.  Vitals:    09/25/24 1448   Weight: 71.2 kg (156 lb 15.5 oz)   Height: 5' 8" (1.727 m)       Review of Systems   Constitutional:  Negative for chills, diaphoresis, fatigue, fever and unexpected weight change.   HENT:  Negative for trouble swallowing.    Eyes:  Negative for visual disturbance.   Respiratory:  Negative for shortness of breath.    Cardiovascular:  Negative for chest pain.   Gastrointestinal:  Negative for abdominal pain, constipation, diarrhea, nausea and vomiting.   Genitourinary:  Negative for difficulty urinating.   Musculoskeletal:  Negative for arthralgias, back pain, gait problem, joint swelling, myalgias, neck pain and neck stiffness.   Neurological:  Negative for dizziness, speech difficulty, weakness, light-headedness, numbness and headaches.          Objective:      Physical Exam  Neurological:      Mental Status: He is alert and oriented to person, place, and time.             Assessment:       1. Cervicalgia    2. Chronic bilateral low back pain with left-sided sciatica           Plan:     I reassured him there are no worrisome findings on his MRI.  " For symptom relief I am recommending medial branch blocks and subsequent radiofrequency ablation as indicated the L4-5 and L5-S1 facet joints bilaterally.  Follow up with me after the procedure      Cervicalgia    Chronic bilateral low back pain with left-sided sciatica

## 2024-09-25 NOTE — TELEPHONE ENCOUNTER
----- Message from Dominick Hernandez MD sent at 9/25/2024  3:04 PM CDT -----  Please schedule for medial branch blocks and subsequent radiofrequency ablation as indicated of the L4-5 and L5-S1 facet joints bilaterally

## 2024-09-25 NOTE — PROGRESS NOTES
SUBJECTIVE:    Patient ID: Bernardo Oliveira is a 89 y.o. male.    Chief Complaint: Follow-up    He is here to review his lumbar MRI done 09/06/2024 to evaluate his complaint of left greater than right-sided low back pain at the lumbosacral junction radiating into the left anterior thigh to the level of the knee with the back pain being more bothersome than the leg pain.  The MRI is summarized below:      FINDINGS:  Alignment: Normal.     Vertebrae: Normal marrow signal. No fracture.     Discs: There is diffuse disc desiccation and disc space narrowing.  There are degenerative endplate changes at T12-L1, L1-2 and L2-3.  There are Schmorl's nodes within the inferior endplate of T11, T12 and L2.     Cord: Normal.  Conus terminates at L1     Degenerative findings:     T12-L1: Broad-based disc bulge and facet hypertrophy resulting in mild foraminal narrowing, right greater than left     L1-L2: Broad-based disc bulge and facet hypertrophy resulting in mild foraminal narrowing     L2-L3: Broad-based disc bulge and facet hypertrophy resulting in mild foraminal narrowing     L3-L4: Broad-based disc bulge facet hypertrophy resulting moderate foraminal narrowing     L4-L5: Broad-based disc bulge, facet hypertrophy and ligamentum flavum hypertrophy resulting in moderate foraminal narrowing right greater than left.     L5-S1: Broad-based disc bulge and facet hypertrophy resulting in moderate foraminal narrowing     Paraspinal muscles & soft tissues: Multiple right renal cyst.  The paraspinous soft tissues are otherwise unremarkable.     Impression:     Multilevel degenerative disc disease and facet hypertrophy resulting in multilevel foraminal narrowing most significant at L4-5 and L5-S1     Multiple right renal cyst      Clinically he is about the same.  His primary complaint is left greater than right-sided low back pain at the lumbosacral junction.  Occasionally gets radiating discomfort into the left greater than right  anterior thighs to the knees and sometimes even extending beyond the knees.  The leg discomfort is a bit nebulous.  He also is complaining of some chronic left-sided neck pain that radiates into the left scapula and into the left upper arm to about the level of elbow.  He actually told me about that when I saw him in August of last year.  He responded to physical therapy back then.  Interlaminar injections at C7-T1 were being considered.  Has known advanced cervical degenerative disc disease.  His pain level is 10/10 and interferes with quality of life in terms of activities of daily living recreation and social activities          Past Medical History:   Diagnosis Date    Anxiety     Asbestos-induced pleural plaque 2020    Depression     GERD (gastroesophageal reflux disease)     Hyperlipidemia     Hypertension     Vocal cord cancer      Social History     Socioeconomic History    Marital status:      Spouse name: Yana Oliveira   Occupational History    Occupation: Retired -  on [employer]   Tobacco Use    Smoking status: Former     Current packs/day: 0.00     Types: Cigarettes     Quit date: 1982     Years since quittin.3    Smokeless tobacco: Never   Substance and Sexual Activity    Alcohol use: Not Currently    Drug use: No    Sexual activity: Not Currently     Social Determinants of Health     Financial Resource Strain: Low Risk  (2022)    Overall Financial Resource Strain (CARDIA)     Difficulty of Paying Living Expenses: Not very hard   Food Insecurity: No Food Insecurity (2022)    Hunger Vital Sign     Worried About Running Out of Food in the Last Year: Never true     Ran Out of Food in the Last Year: Never true   Transportation Needs: No Transportation Needs (2022)    PRAPARE - Transportation     Lack of Transportation (Medical): No     Lack of Transportation (Non-Medical): No   Physical Activity: Inactive (2022)    Exercise Vital Sign     Days of  "Exercise per Week: 0 days     Minutes of Exercise per Session: 0 min   Stress: Stress Concern Present (11/23/2022)    [employer] [place] [employer] - Occupational Stress Questionnaire     Feeling of Stress : To some extent   Housing Stability: Low Risk  (11/23/2022)    Housing Stability Vital Sign     Unable to Pay for Housing in the Last Year: No     Number of Places Lived in the Last Year: 1     Unstable Housing in the Last Year: No     Past Surgical History:   Procedure Laterality Date    CATARACT EXTRACTION, BILATERAL      CHOLECYSTECTOMY      CIRCUMCISION      INGUINAL HERNIA REPAIR Right 2000    INGUINAL HERNIA REPAIR Left 1995    RETINAL DETACHMENT SURGERY      THROAT SURGERY      X 2 for vocal cord cancer    TONSILLECTOMY      TRANSURETHRAL RESECTION OF PROSTATE  2004    dr cindi ryan - [facility]     No family history on file.  Vitals:    09/25/24 1448   Weight: 71.2 kg (156 lb 15.5 oz)   Height: 5' 8" (1.727 m)       Review of Systems   Constitutional:  Negative for chills, diaphoresis, fatigue, fever and unexpected weight change.   HENT:  Negative for trouble swallowing.    Eyes:  Negative for visual disturbance.   Respiratory:  Negative for shortness of breath.    Cardiovascular:  Negative for chest pain.   Gastrointestinal:  Negative for abdominal pain, constipation, diarrhea, nausea and vomiting.   Genitourinary:  Negative for difficulty urinating.   Musculoskeletal:  Negative for arthralgias, back pain, gait problem, joint swelling, myalgias, neck pain and neck stiffness.   Neurological:  Negative for dizziness, speech difficulty, weakness, light-headedness, numbness and headaches.          Objective:      Physical Exam  Neurological:      Mental Status: He is alert and oriented to person, place, and time.             Assessment:       1. Cervicalgia    2. Chronic bilateral low back pain with left-sided sciatica           Plan:     I reassured him there are no worrisome findings on his MRI.  " For symptom relief I am recommending medial branch blocks and subsequent radiofrequency ablation as indicated the L4-5 and L5-S1 facet joints bilaterally.  Follow up with me after the procedure      Cervicalgia    Chronic bilateral low back pain with left-sided sciatica

## 2024-10-10 ENCOUNTER — HOSPITAL ENCOUNTER (OUTPATIENT)
Facility: HOSPITAL | Age: 89
Discharge: HOME | End: 2024-10-10
Attending: ANESTHESIOLOGY | Admitting: ANESTHESIOLOGY
Payer: MEDICARE

## 2024-10-10 DIAGNOSIS — M47.896 OTHER SPONDYLOSIS, LUMBAR REGION: ICD-10-CM

## 2024-10-10 PROCEDURE — 64494 INJ PARAVERT F JNT L/S 2 LEV: CPT | Mod: 50,KX,, | Performed by: ANESTHESIOLOGY

## 2024-10-10 PROCEDURE — 71000015 HC POSTOP RECOV 1ST HR: Performed by: ANESTHESIOLOGY

## 2024-10-10 PROCEDURE — 36000704 HC OR TIME LEV I 1ST 15 MIN: Performed by: ANESTHESIOLOGY

## 2024-10-10 PROCEDURE — 64493 INJ PARAVERT F JNT L/S 1 LEV: CPT | Mod: 50,KX,, | Performed by: ANESTHESIOLOGY

## 2024-10-10 PROCEDURE — 63600175 PHARM REV CODE 636 W HCPCS: Mod: JZ,JG | Performed by: ANESTHESIOLOGY

## 2024-10-10 RX ORDER — SODIUM CHLORIDE, SODIUM LACTATE, POTASSIUM CHLORIDE, CALCIUM CHLORIDE 600; 310; 30; 20 MG/100ML; MG/100ML; MG/100ML; MG/100ML
INJECTION, SOLUTION INTRAVENOUS CONTINUOUS
Status: DISCONTINUED | OUTPATIENT
Start: 2024-10-10 | End: 2024-10-10 | Stop reason: HOSPADM

## 2024-10-10 RX ORDER — BUPIVACAINE HYDROCHLORIDE 5 MG/ML
INJECTION, SOLUTION EPIDURAL; INTRACAUDAL
Status: DISCONTINUED | OUTPATIENT
Start: 2024-10-10 | End: 2024-10-10 | Stop reason: HOSPADM

## 2024-10-10 RX ORDER — LIDOCAINE HYDROCHLORIDE 10 MG/ML
1 INJECTION, SOLUTION EPIDURAL; INFILTRATION; INTRACAUDAL; PERINEURAL ONCE
Status: DISCONTINUED | OUTPATIENT
Start: 2024-10-10 | End: 2024-10-10 | Stop reason: HOSPADM

## 2024-10-10 NOTE — PLAN OF CARE
Patient and spouse given discharge instructions. Educated when to notify MD. Instructed when to follow up. Transferred to car via wheelchair and left with spouse to home.

## 2024-10-10 NOTE — DISCHARGE SUMMARY
[facility]  Discharge Note  Short Stay    Procedure(s) (LRB):  Block-nerve-medial branch-lumbar l4/5 and l5/s1 MBB (Bilateral)      OUTCOME: Patient tolerated treatment/procedure well without complication and is now ready for discharge.    DISPOSITION: Home or Self Care    FINAL DIAGNOSIS:  <principal problem not specified>    FOLLOWUP: In clinic    DISCHARGE INSTRUCTIONS:    Discharge Procedure Orders   Notify your health care provider if you experience any of the following:  temperature >100.4     Notify your health care provider if you experience any of the following:  severe uncontrolled pain     Notify your health care provider if you experience any of the following:  redness, tenderness, or signs of infection (pain, swelling, redness, odor or green/yellow discharge around incision site)     Activity as tolerated        TIME SPENT ON DISCHARGE: 30 minutes

## 2024-10-10 NOTE — DISCHARGE INSTRUCTIONS
NERVE BLOCK   This was a test not a treatment. Please keep pain journal as instructed  Tips for recovery  You may use an ice pack at your injection site for comfort.  You may shower this evening.   Do not use a heating pad or take tub baths or swim for 2 days.  Take your usual medication for pain if needed.  Dont drive the day of the procedure.  Wait until tomorrow to resume any blood thinners (aspirin, Plavix, Coumadin) but you may resume all your other medications today.  When to call your doctor  Call right away if you notice any of the following symptoms:  Severe pain or headache  Fever or chills  Redness or swelling around the injection site   Difficulty breathing  Vomiting  Increasing numbness or weakness in arms or legs                    
no known allergies

## 2024-10-10 NOTE — OP NOTE
PROCEDURE DATE: 10/10/2024    PROCEDURE:  Diagnostic medial branch nerve blocks to the bilateral L4/5 and L5/S1 facets under fluoroscopy     DIAGNOSIS:  Other spondylosis, lumbar region    Post Op diagnosis: Same    PHYSICIAN: Kamran Khan MD    MEDICATIONS INJECTED: 0.5% bupivicaine, 0.5 ml at each level    SEDATION MEDICATIONS:None     LOCAL ANESTHETIC USED: None    ESTIMATED BLOOD LOSS:  None    COMPLICATIONS:  None    TECHNIQUE: A time out was taken to identify the patient, procedure and side of the procedure. The patient was placed in a prone position, then prepped and draped in the usual sterile fashion using ChloraPrep and sterile towels.  The levels were determined under fluoroscopic guidance and then marked.  A 25-gauge 3.5 inch needle was introduced to the anatomic location of the medial branch nerves that innervate the bilateral L4/5 and L5/S1 facets. The above medication was then injected. The patient tolerated the procedure well.     The patient was monitored after the procedure. Patient was given pain diary to record pain levels at home.     If found to have greater than a 50% recovery and so will be scheduled for a radiofrequency ablation of the corresponding nerves.  Patient was given post procedure and discharge instructions to follow at home.  The patient was discharged in a stable condition.

## 2024-10-11 ENCOUNTER — TELEPHONE (OUTPATIENT)
Dept: PAIN MEDICINE | Facility: CLINIC | Age: 89
End: 2024-10-11
Payer: MEDICARE

## 2024-10-11 VITALS
RESPIRATION RATE: 16 BRPM | HEIGHT: 68 IN | BODY MASS INDEX: 23.79 KG/M2 | DIASTOLIC BLOOD PRESSURE: 91 MMHG | OXYGEN SATURATION: 99 % | HEART RATE: 67 BPM | WEIGHT: 156.94 LBS | TEMPERATURE: 97 F | SYSTOLIC BLOOD PRESSURE: 183 MMHG

## 2024-10-11 DIAGNOSIS — M47.816 LUMBAR SPONDYLOSIS: Primary | ICD-10-CM

## 2024-10-11 NOTE — TELEPHONE ENCOUNTER
Regarding your Lumbar Medial Branch Block , For Date of Service:  10/10    Please answer the following questions:    1. What percentage of pain relief did you receive following the block, from 0-100%?     2. How many hours did pain relief last following the block?      3. During this time please describe in detail the activities you were able to do?    4. Pain score from 0-10 pre procedure -      5. Pain score from 0-10 post procedure -       Pt had MBMILES 10/10 will call after 8 am for relief

## 2024-10-11 NOTE — TELEPHONE ENCOUNTER
Spoke with pt and his wife states that he received 80% relief x 2 hours and his starting score was 8 and  pain reduced to 3  scheduled for 2nd block for 10/23/2024 instructions given

## 2024-10-23 ENCOUNTER — HOSPITAL ENCOUNTER (OUTPATIENT)
Facility: HOSPITAL | Age: 89
Discharge: HOME | End: 2024-10-23
Attending: ANESTHESIOLOGY | Admitting: ANESTHESIOLOGY
Payer: MEDICARE

## 2024-10-23 DIAGNOSIS — M47.896 OTHER SPONDYLOSIS, LUMBAR REGION: ICD-10-CM

## 2024-10-23 PROCEDURE — 71000015 HC POSTOP RECOV 1ST HR: Performed by: ANESTHESIOLOGY

## 2024-10-23 PROCEDURE — 63600175 PHARM REV CODE 636 W HCPCS: Mod: JZ,JG | Performed by: ANESTHESIOLOGY

## 2024-10-23 PROCEDURE — 64494 INJ PARAVERT F JNT L/S 2 LEV: CPT | Mod: 50

## 2024-10-23 PROCEDURE — 36000704 HC OR TIME LEV I 1ST 15 MIN: Performed by: ANESTHESIOLOGY

## 2024-10-23 PROCEDURE — 64493 INJ PARAVERT F JNT L/S 1 LEV: CPT | Mod: 50,KX,, | Performed by: ANESTHESIOLOGY

## 2024-10-23 PROCEDURE — 64494 INJ PARAVERT F JNT L/S 2 LEV: CPT | Mod: 50,KX,, | Performed by: ANESTHESIOLOGY

## 2024-10-23 PROCEDURE — 64493 INJ PARAVERT F JNT L/S 1 LEV: CPT | Mod: 50

## 2024-10-23 RX ORDER — SODIUM CHLORIDE, SODIUM LACTATE, POTASSIUM CHLORIDE, CALCIUM CHLORIDE 600; 310; 30; 20 MG/100ML; MG/100ML; MG/100ML; MG/100ML
INJECTION, SOLUTION INTRAVENOUS CONTINUOUS
Status: DISCONTINUED | OUTPATIENT
Start: 2024-10-23 | End: 2024-10-23 | Stop reason: HOSPADM

## 2024-10-23 RX ORDER — BUPIVACAINE HYDROCHLORIDE 5 MG/ML
INJECTION, SOLUTION EPIDURAL; INTRACAUDAL
Status: DISCONTINUED | OUTPATIENT
Start: 2024-10-23 | End: 2024-10-23 | Stop reason: HOSPADM

## 2024-10-23 RX ORDER — LIDOCAINE HYDROCHLORIDE 10 MG/ML
1 INJECTION, SOLUTION EPIDURAL; INFILTRATION; INTRACAUDAL; PERINEURAL ONCE
Status: DISCONTINUED | OUTPATIENT
Start: 2024-10-23 | End: 2024-10-23 | Stop reason: HOSPADM

## 2024-10-24 ENCOUNTER — TELEPHONE (OUTPATIENT)
Dept: PAIN MEDICINE | Facility: CLINIC | Age: 89
End: 2024-10-24
Payer: MEDICARE

## 2024-10-24 VITALS
RESPIRATION RATE: 18 BRPM | HEIGHT: 68 IN | DIASTOLIC BLOOD PRESSURE: 74 MMHG | HEART RATE: 68 BPM | WEIGHT: 156 LBS | OXYGEN SATURATION: 98 % | SYSTOLIC BLOOD PRESSURE: 155 MMHG | BODY MASS INDEX: 23.64 KG/M2 | TEMPERATURE: 98 F

## 2024-10-31 ENCOUNTER — TELEPHONE (OUTPATIENT)
Dept: PAIN MEDICINE | Facility: CLINIC | Age: 89
End: 2024-10-31
Payer: MEDICARE

## 2024-11-06 ENCOUNTER — OFFICE VISIT (OUTPATIENT)
Dept: SPINE | Facility: CLINIC | Age: 89
End: 2024-11-06
Payer: MEDICARE

## 2024-11-06 VITALS — WEIGHT: 156.06 LBS | BODY MASS INDEX: 23.65 KG/M2 | HEIGHT: 68 IN

## 2024-11-06 DIAGNOSIS — M54.42 CHRONIC BILATERAL LOW BACK PAIN WITH LEFT-SIDED SCIATICA: Primary | ICD-10-CM

## 2024-11-06 DIAGNOSIS — G89.29 CHRONIC BILATERAL LOW BACK PAIN WITH LEFT-SIDED SCIATICA: Primary | ICD-10-CM

## 2024-11-06 PROCEDURE — 99999 PR PBB SHADOW E&M-EST. PATIENT-LVL III: CPT | Mod: PBBFAC,,, | Performed by: PHYSICAL MEDICINE & REHABILITATION

## 2024-11-06 NOTE — PROGRESS NOTES
SUBJECTIVE:    Patient ID: Bernardo Oliveira is a 89 y.o. male.    Chief Complaint: Follow-up    He is here for routine follow up.  He underwent medial branch blocks bilaterally L4-5 and L5-S1 on 10/10/2024 and 10/23/2024 with Dr. Khan.  He received 80% improvement from the 1st blocks but only about 20% from 2nd.  He simply is unable to say that he she more relief that.  Pain level is 8/10 and interferes with quality of life in terms of activities of daily living recreation and social activities.  Today's describing some discomfort into the anterior portion of the left greater than right legs.  These are familiar symptoms.          Past Medical History:   Diagnosis Date    Anxiety     Asbestos-induced pleural plaque 2020    Back pain     Depression     GERD (gastroesophageal reflux disease)     Hyperlipidemia     Hypertension     Vocal cord cancer      Social History     Socioeconomic History    Marital status:      Spouse name: Yana Oliveira   Occupational History    Occupation: Retired -  on [employer]   Tobacco Use    Smoking status: Former     Current packs/day: 0.00     Types: Cigarettes     Quit date: 1982     Years since quittin.4    Smokeless tobacco: Never   Substance and Sexual Activity    Alcohol use: Not Currently    Drug use: No    Sexual activity: Not Currently     Social Drivers of Health     Financial Resource Strain: Low Risk  (2022)    Overall Financial Resource Strain (CARDIA)     Difficulty of Paying Living Expenses: Not very hard   Food Insecurity: No Food Insecurity (2022)    Hunger Vital Sign     Worried About Running Out of Food in the Last Year: Never true     Ran Out of Food in the Last Year: Never true   Transportation Needs: No Transportation Needs (2022)    PRAPARE - Transportation     Lack of Transportation (Medical): No     Lack of Transportation (Non-Medical): No   Physical Activity: Inactive (2022)    Exercise Vital Sign      "Days of Exercise per Week: 0 days     Minutes of Exercise per Session: 0 min   Stress: Stress Concern Present (11/23/2022)    [employer] [place] [employer] - Occupational Stress Questionnaire     Feeling of Stress : To some extent   Housing Stability: Low Risk  (11/23/2022)    Housing Stability Vital Sign     Unable to Pay for Housing in the Last Year: No     Number of Places Lived in the Last Year: 1     Unstable Housing in the Last Year: No     Past Surgical History:   Procedure Laterality Date    CATARACT EXTRACTION, BILATERAL      CHOLECYSTECTOMY      CIRCUMCISION      INGUINAL HERNIA REPAIR Right 2000    INGUINAL HERNIA REPAIR Left 1995    INJECTION OF ANESTHETIC AGENT AROUND MEDIAL BRANCH NERVES INNERVATING LUMBAR FACET JOINT Bilateral 10/10/2024    Procedure: Block-nerve-medial branch-lumbar;  Surgeon: Kamran Khan MD;  Location: [facility];  Service: Pain Management;  Laterality: Bilateral;    INJECTION OF ANESTHETIC AGENT AROUND MEDIAL BRANCH NERVES INNERVATING LUMBAR FACET JOINT Bilateral 10/23/2024    Procedure: Block-nerve-medial branch-lumbar;  Surgeon: Kamran Khan MD;  Location: [facility] OR;  Service: Pain Management;  Laterality: Bilateral;    RETINAL DETACHMENT SURGERY      THROAT SURGERY      X 2 for vocal cord cancer    TONSILLECTOMY      TRANSURETHRAL RESECTION OF PROSTATE  2004    dr cindi ryan - [facility]     No family history on file.  Vitals:    11/06/24 1018   Weight: 70.8 kg (156 lb 1.4 oz)   Height: 5' 8" (1.727 m)       Review of Systems   Constitutional:  Negative for chills, diaphoresis, fatigue, fever and unexpected weight change.   HENT:  Negative for trouble swallowing.    Eyes:  Negative for visual disturbance.   Respiratory:  Negative for shortness of breath.    Cardiovascular:  Negative for chest pain.   Gastrointestinal:  Negative for abdominal pain, constipation, diarrhea, nausea and vomiting.   Genitourinary:  Negative for difficulty urinating.   Musculoskeletal:  Negative " for arthralgias, back pain, gait problem, joint swelling, myalgias, neck pain and neck stiffness.   Neurological:  Negative for dizziness, speech difficulty, weakness, light-headedness, numbness and headaches.          Objective:      Physical Exam  Neurological:      Mental Status: He is alert and oriented to person, place, and time.             Assessment:       1. Chronic bilateral low back pain with left-sided sciatica           Plan:     Unfortunately he did not improve with medial branch blocks so radiofrequency ablation is not indicated.  I offered him interlaminar injections at L3-4 for the leg symptoms.  He elects to defer.  We also talked about referral to pain management for 2nd opinion.  He is going to think about those options.  He can contact the office if he wishes to proceed otherwise follow up here as needed.      Chronic bilateral low back pain with left-sided sciatica

## 2024-11-18 ENCOUNTER — TELEPHONE (OUTPATIENT)
Dept: PAIN MEDICINE | Facility: CLINIC | Age: 89
End: 2024-11-18
Payer: MEDICARE

## 2024-11-18 DIAGNOSIS — M54.16 LUMBAR RADICULOPATHY: ICD-10-CM

## 2024-11-18 DIAGNOSIS — M47.816 LUMBAR SPONDYLOSIS: Primary | ICD-10-CM

## 2024-11-18 NOTE — TELEPHONE ENCOUNTER
Spoke with wife as pt is hard of hearing they are at a restaurant and she would like a call back later

## 2024-11-18 NOTE — TELEPHONE ENCOUNTER
----- Message from Dominick Hernandez MD sent at 11/18/2024  9:24 AM CST -----  Please schedule for interlaminar injection L3-4

## 2024-11-27 ENCOUNTER — TELEPHONE (OUTPATIENT)
Dept: PAIN MEDICINE | Facility: CLINIC | Age: 89
End: 2024-11-27
Payer: MEDICARE

## 2024-11-27 NOTE — TELEPHONE ENCOUNTER
----- Message from Iqra sent at 11/27/2024  3:11 PM CST -----  Type:  Patient Returning Call    Who Called: pt's wife     Who Left Message for Patient: sherri    Does the patient know what this is regarding?:    Would the patient rather a call back or a response via [facility]? call    Best Call Back Number:There are no phone numbers on file.[phone]       Additional Information:

## 2024-12-05 ENCOUNTER — TELEPHONE (OUTPATIENT)
Dept: PAIN MEDICINE | Facility: CLINIC | Age: 89
End: 2024-12-05
Payer: MEDICARE

## 2024-12-05 NOTE — TELEPHONE ENCOUNTER
----- Message from Iqra sent at 12/5/2024  2:56 PM CST -----  Type:  Patient Returning Call    Who Called: pt     Who Left Message for Patient: doesn't know    Does the patient know what this is regarding?:no    Would the patient rather a call back or a response via [facility]? call    Best Call Back Number:There are no phone numbers on file.[phone]       Additional Information:

## 2024-12-09 ENCOUNTER — HOSPITAL ENCOUNTER (OUTPATIENT)
Facility: HOSPITAL | Age: 89
Discharge: HOME | End: 2024-12-09
Attending: ANESTHESIOLOGY | Admitting: ANESTHESIOLOGY
Payer: MEDICARE

## 2024-12-09 DIAGNOSIS — M54.16 LUMBAR RADICULITIS: ICD-10-CM

## 2024-12-09 PROCEDURE — A4216 STERILE WATER/SALINE, 10 ML: HCPCS | Performed by: ANESTHESIOLOGY

## 2024-12-09 PROCEDURE — 62323 NJX INTERLAMINAR LMBR/SAC: CPT | Mod: ,,, | Performed by: ANESTHESIOLOGY

## 2024-12-09 PROCEDURE — 63600175 PHARM REV CODE 636 W HCPCS: Performed by: ANESTHESIOLOGY

## 2024-12-09 PROCEDURE — 62323 NJX INTERLAMINAR LMBR/SAC: CPT | Performed by: ANESTHESIOLOGY

## 2024-12-09 PROCEDURE — 25500020 PHARM REV CODE 255: Performed by: ANESTHESIOLOGY

## 2024-12-09 PROCEDURE — 25000003 PHARM REV CODE 250: Performed by: ANESTHESIOLOGY

## 2024-12-09 RX ORDER — SODIUM CHLORIDE, SODIUM LACTATE, POTASSIUM CHLORIDE, CALCIUM CHLORIDE 600; 310; 30; 20 MG/100ML; MG/100ML; MG/100ML; MG/100ML
INJECTION, SOLUTION INTRAVENOUS CONTINUOUS
Status: DISCONTINUED | OUTPATIENT
Start: 2024-12-09 | End: 2024-12-09 | Stop reason: HOSPADM

## 2024-12-09 RX ORDER — ONDANSETRON HYDROCHLORIDE 2 MG/ML
INJECTION, SOLUTION INTRAVENOUS
Status: DISCONTINUED | OUTPATIENT
Start: 2024-12-09 | End: 2024-12-09 | Stop reason: HOSPADM

## 2024-12-09 RX ORDER — DEXAMETHASONE SODIUM PHOSPHATE 10 MG/ML
INJECTION INTRAMUSCULAR; INTRAVENOUS
Status: DISCONTINUED | OUTPATIENT
Start: 2024-12-09 | End: 2024-12-09 | Stop reason: HOSPADM

## 2024-12-09 RX ORDER — MIDAZOLAM HYDROCHLORIDE 1 MG/ML
INJECTION INTRAMUSCULAR; INTRAVENOUS
Status: DISCONTINUED | OUTPATIENT
Start: 2024-12-09 | End: 2024-12-09 | Stop reason: HOSPADM

## 2024-12-09 RX ORDER — FENTANYL CITRATE 50 UG/ML
INJECTION, SOLUTION INTRAMUSCULAR; INTRAVENOUS
Status: DISCONTINUED | OUTPATIENT
Start: 2024-12-09 | End: 2024-12-09 | Stop reason: HOSPADM

## 2024-12-09 RX ORDER — LIDOCAINE HYDROCHLORIDE 10 MG/ML
1 INJECTION, SOLUTION EPIDURAL; INFILTRATION; INTRACAUDAL; PERINEURAL ONCE
Status: DISCONTINUED | OUTPATIENT
Start: 2024-12-09 | End: 2024-12-09 | Stop reason: HOSPADM

## 2024-12-09 RX ORDER — SODIUM CHLORIDE 9 MG/ML
INJECTION, SOLUTION INTRAMUSCULAR; INTRAVENOUS; SUBCUTANEOUS
Status: DISCONTINUED | OUTPATIENT
Start: 2024-12-09 | End: 2024-12-09 | Stop reason: HOSPADM

## 2024-12-09 RX ORDER — LIDOCAINE HYDROCHLORIDE 10 MG/ML
INJECTION, SOLUTION EPIDURAL; INFILTRATION; INTRACAUDAL; PERINEURAL
Status: DISCONTINUED | OUTPATIENT
Start: 2024-12-09 | End: 2024-12-09 | Stop reason: HOSPADM

## 2024-12-09 NOTE — DISCHARGE SUMMARY
[facility] - Periop Services  Discharge Note  Short Stay    Procedure(s) (LRB):  Injection-steroid-epidural-lumbar l3-4 (N/A)      OUTCOME: Patient tolerated treatment/procedure well without complication and is now ready for discharge.    DISPOSITION: Home or Self Care    FINAL DIAGNOSIS:  <principal problem not specified>    FOLLOWUP: In clinic    DISCHARGE INSTRUCTIONS:    Discharge Procedure Orders   Notify your health care provider if you experience any of the following:  temperature >100.4     Notify your health care provider if you experience any of the following:  severe uncontrolled pain     Notify your health care provider if you experience any of the following:  redness, tenderness, or signs of infection (pain, swelling, redness, odor or green/yellow discharge around incision site)     Activity as tolerated        TIME SPENT ON DISCHARGE: 30 minutes

## 2024-12-09 NOTE — OP NOTE
PROCEDURE DATE: 12/9/2024    Procedure:   Interlaminar epidural steroid injection at L3-4 under fluoroscopic guidance.    Diagnosis: lUMBAR radiculitis  pOSTOP DIAGNOSIS: sAME    Physician: Kamran Khan M.D.    Medications injected:10 mg dexamethasone with 4 ml of preservative free NaCl    Local anesthetic injected:    Lidocaine 1% 2 ml total    Sedation Medications: RN IV sedation    Estimated blood loss:  None    Complications:  None    Technique:  Time-out taken to identify patient and procedure prior to starting the procedure.  With the patient laying in a prone position, the area was prepped and draped in the usual sterile fashion using ChloraPrep and a fenestrated drape.  After determining the target level with an AP fluoroscopic view, local anesthetic was given using a 25-gauge 1.5 inch needle by raising a wheal and then infiltrating toward the interlaminar entry space.  A 3.5inch 20-gauge Touhy needle was introduced under AP fluoroscopic guidance to the interlaminar space of L3-4. Once the trajectory was established, the needle was visualized in the lateral view and advanced using loss of resistance technique. Once in the desired position, 1ml contrast was injected to confirm placement and there was no vascular uptake nor intrathecal spread.  The medication was then injected slowly. The patient tolerated the procedure well.      The patient was monitored after the procedure.   They were given post-procedure and discharge instructions to follow at home.  The patient was discharged in a stable condition.

## 2024-12-09 NOTE — H&P
CC: low back pain    HPI: The patient is a 89 y.o. male with a history of low back pain here for STEPHAN. There are no major changes in history and physical from 24 by David.    Past Medical History:   Diagnosis Date    Anxiety     Asbestos-induced pleural plaque 2020    Back pain     Depression     GERD (gastroesophageal reflux disease)     Hyperlipidemia     Hypertension     Vocal cord cancer        Past Surgical History:   Procedure Laterality Date    CATARACT EXTRACTION, BILATERAL      CHOLECYSTECTOMY      CIRCUMCISION      INGUINAL HERNIA REPAIR Right 2000    INGUINAL HERNIA REPAIR Left 1995    INJECTION OF ANESTHETIC AGENT AROUND MEDIAL BRANCH NERVES INNERVATING LUMBAR FACET JOINT Bilateral 10/10/2024    Procedure: Block-nerve-medial branch-lumbar;  Surgeon: Kamran Khan MD;  Location: [facility];  Service: Pain Management;  Laterality: Bilateral;    INJECTION OF ANESTHETIC AGENT AROUND MEDIAL BRANCH NERVES INNERVATING LUMBAR FACET JOINT Bilateral 10/23/2024    Procedure: Block-nerve-medial branch-lumbar;  Surgeon: Kamran Khan MD;  Location: [facility];  Service: Pain Management;  Laterality: Bilateral;    RETINAL DETACHMENT SURGERY      THROAT SURGERY      X 2 for vocal cord cancer    TONSILLECTOMY      TRANSURETHRAL RESECTION OF PROSTATE      dr cindi ryan - [facility]       No family history on file.    Social History     Socioeconomic History    Marital status:      Spouse name: Yana Oliveira   Occupational History    Occupation: Retired -  on [email or web address]   Tobacco Use    Smoking status: Former     Current packs/day: 0.00     Types: Cigarettes     Quit date: 1982     Years since quittin.5    Smokeless tobacco: Never   Substance and Sexual Activity    Alcohol use: Not Currently    Drug use: No    Sexual activity: Not Currently     Social Drivers of Health     Financial Resource Strain: Low Risk  (2022)    Overall Financial Resource Strain (CARDIA)     Difficulty of Paying  "Living Expenses: Not very hard   Food Insecurity: No Food Insecurity (11/23/2022)    Hunger Vital Sign     Worried About Running Out of Food in the Last Year: Never true     Ran Out of Food in the Last Year: Never true   Transportation Needs: No Transportation Needs (11/23/2022)    PRAPARE - Transportation     Lack of Transportation (Medical): No     Lack of Transportation (Non-Medical): No   Physical Activity: Inactive (11/23/2022)    Exercise Vital Sign     Days of Exercise per Week: 0 days     Minutes of Exercise per Session: 0 min   Stress: Stress Concern Present (11/23/2022)    [employer] [place] [employer] - Occupational Stress Questionnaire     Feeling of Stress : To some extent   Housing Stability: Low Risk  (11/23/2022)    Housing Stability Vital Sign     Unable to Pay for Housing in the Last Year: No     Number of Places Lived in the Last Year: 1     Unstable Housing in the Last Year: No       No current facility-administered medications for this encounter.       Review of patient's allergies indicates:   Allergen Reactions    Codeine Nausea Only       Vitals:    12/03/24 1354   Weight: 70.8 kg (156 lb 1.4 oz)   Height: 5' 8" (1.727 m)       REVIEW OF SYSTEMS:     GENERAL: No weight loss, malaise or fevers.  HEENT:  No recent changes in vision or hearing  NECK: Negative for lumps, no difficulty with swallowing.  RESPIRATORY: Negative for cough, wheezing or shortness of breath, patient denies any recent URI.  CARDIOVASCULAR: Negative for chest pain, leg swelling or palpitations.  GI: Negative for abdominal discomfort, blood in stools or black stools or change in bowel habits.  MUSCULOSKELETAL: See HPI.  SKIN: Negative for lesions, rash, and itching.  PSYCH: No suicidal or homicidal ideations, no current mood disturbances.  HEMATOLOGY/LYMPHOLOGY: Negative for prolonged bleeding, bruising easily or swollen nodes. Patient is not currently taking any anti-coagulants  ENDO: No history of diabetes or " thyroid dysfunction  NEURO: No history of syncope, paralysis, seizures or tremors.All other reviewed and negative other than HPI.    Physical exam:  Gen: A and O x3, pleasant, well-groomed  Skin: No rashes or obvious lesions  HEENT: PERRLA, no obvious deformities on ears or in canals. No thyroid masses, trachea midline, no palpable lymph nodes in neck, axilla.  CVS: Regular rate and rhythm, normal S1 and S2, no murmurs.  Resp: Clear to auscultation bilaterally.  Abdomen: Soft, NT/ND, normal bowel sounds present.  Musculoskeletal/Neuro: Moving all extremities    Assessment:  Lumbar radiculitis    Other orders  -     Place in Outpatient; Standing  -     Vital signs; Standing  -     LIDOcaine (PF) 10 mg/ml (1%) injection 10 mg  -     Verify informed consent; Standing  -     Notify physician ; Standing  -     Notify physician ; Standing  -     Notify physician (specify); Standing  -     Diet NPO; Standing  -     lactated ringers infusion  -     IP VTE LOW RISK PATIENT; Standing          PLAN: STEPHAN      This patient has been cleared for surgery in an ambulatory surgical facility    ASA 3,  Mallampatti Score 3  No history of anesthetic complications  Plan for RN IV sedation

## 2024-12-10 VITALS
DIASTOLIC BLOOD PRESSURE: 74 MMHG | HEIGHT: 68 IN | BODY MASS INDEX: 23.65 KG/M2 | SYSTOLIC BLOOD PRESSURE: 172 MMHG | RESPIRATION RATE: 20 BRPM | TEMPERATURE: 98 F | WEIGHT: 156.06 LBS | OXYGEN SATURATION: 96 % | HEART RATE: 67 BPM

## 2025-01-07 ENCOUNTER — OFFICE VISIT (OUTPATIENT)
Dept: SPINE | Facility: CLINIC | Age: OVER 89
End: 2025-01-07
Payer: MEDICARE

## 2025-01-07 VITALS — WEIGHT: 156.06 LBS | HEIGHT: 68 IN | BODY MASS INDEX: 23.65 KG/M2

## 2025-01-07 DIAGNOSIS — G89.29 CHRONIC BILATERAL LOW BACK PAIN WITH LEFT-SIDED SCIATICA: Primary | ICD-10-CM

## 2025-01-07 DIAGNOSIS — M54.42 CHRONIC BILATERAL LOW BACK PAIN WITH LEFT-SIDED SCIATICA: Primary | ICD-10-CM

## 2025-01-07 PROCEDURE — 1125F AMNT PAIN NOTED PAIN PRSNT: CPT | Mod: CPTII,S$GLB,, | Performed by: PHYSICAL MEDICINE & REHABILITATION

## 2025-01-07 PROCEDURE — 1160F RVW MEDS BY RX/DR IN RCRD: CPT | Mod: CPTII,S$GLB,, | Performed by: PHYSICAL MEDICINE & REHABILITATION

## 2025-01-07 PROCEDURE — 99999 PR PBB SHADOW E&M-EST. PATIENT-LVL III: CPT | Mod: PBBFAC,,, | Performed by: PHYSICAL MEDICINE & REHABILITATION

## 2025-01-07 PROCEDURE — 3288F FALL RISK ASSESSMENT DOCD: CPT | Mod: CPTII,S$GLB,, | Performed by: PHYSICAL MEDICINE & REHABILITATION

## 2025-01-07 PROCEDURE — 1101F PT FALLS ASSESS-DOCD LE1/YR: CPT | Mod: CPTII,S$GLB,, | Performed by: PHYSICAL MEDICINE & REHABILITATION

## 2025-01-07 PROCEDURE — 99213 OFFICE O/P EST LOW 20 MIN: CPT | Mod: S$GLB,,, | Performed by: PHYSICAL MEDICINE & REHABILITATION

## 2025-01-07 PROCEDURE — 1159F MED LIST DOCD IN RCRD: CPT | Mod: CPTII,S$GLB,, | Performed by: PHYSICAL MEDICINE & REHABILITATION

## 2025-01-07 NOTE — PROGRESS NOTES
SUBJECTIVE:    Patient ID: Bernardo Oliveira is a 89 y.o. male.    Chief Complaint: Follow-up    He is here for follow-up status post interlaminar injection L3-4 2024 with Dr. Khan.  A good response to that procedure.  He describes 50% improvement in his low back and bilateral anterior leg discomfort with improved functional mobility.  He is satisfied with the quality of life that standpoint.  He has chronic complaints of left knee pain and apparently has had good success with intra-articular injections performed by his primary care physician Dr. Marin.  I offered him a referral to our general physical medicine doctors but he prefers to wait for his appointment with Dr. Marin in April.          Past Medical History:   Diagnosis Date    Anxiety     Asbestos-induced pleural plaque 2020    Back pain     Depression     GERD (gastroesophageal reflux disease)     Hyperlipidemia     Hypertension     Vocal cord cancer      Social History     Socioeconomic History    Marital status:      Spouse name: Yana Oliveira   Occupational History    Occupation: Retired -  on [employer]   Tobacco Use    Smoking status: Former     Current packs/day: 0.00     Types: Cigarettes     Quit date: 1982     Years since quittin.6    Smokeless tobacco: Never   Substance and Sexual Activity    Alcohol use: Not Currently    Drug use: No    Sexual activity: Not Currently     Social Drivers of Health     Financial Resource Strain: Low Risk  (2022)    Overall Financial Resource Strain (CARDIA)     Difficulty of Paying Living Expenses: Not very hard   Food Insecurity: No Food Insecurity (2022)    Hunger Vital Sign     Worried About Running Out of Food in the Last Year: Never true     Ran Out of Food in the Last Year: Never true   Transportation Needs: No Transportation Needs (2022)    PRAPARE - Transportation     Lack of Transportation (Medical): No     Lack of Transportation (Non-Medical):  "No   Physical Activity: Inactive (11/23/2022)    Exercise Vital Sign     Days of Exercise per Week: 0 days     Minutes of Exercise per Session: 0 min   Stress: Stress Concern Present (11/23/2022)    [employer] - Occupational Stress Questionnaire     Feeling of Stress : To some extent   Housing Stability: Low Risk  (11/23/2022)    Housing Stability Vital Sign     Unable to Pay for Housing in the Last Year: No     Number of Places Lived in the Last Year: 1     Unstable Housing in the Last Year: No     Past Surgical History:   Procedure Laterality Date    CATARACT EXTRACTION, BILATERAL      CHOLECYSTECTOMY      CIRCUMCISION      EPIDURAL STEROID INJECTION INTO LUMBAR SPINE N/A 12/9/2024    Procedure: Injection-steroid-epidural-lumbar;  Surgeon: Kamran Khan MD;  Location: [facility] OR;  Service: Pain Management;  Laterality: N/A;    INGUINAL HERNIA REPAIR Right 2000    INGUINAL HERNIA REPAIR Left 1995    INJECTION OF ANESTHETIC AGENT AROUND MEDIAL BRANCH NERVES INNERVATING LUMBAR FACET JOINT Bilateral 10/10/2024    Procedure: Block-nerve-medial branch-lumbar;  Surgeon: Kamran Khan MD;  Location: [facility] OR;  Service: Pain Management;  Laterality: Bilateral;    INJECTION OF ANESTHETIC AGENT AROUND MEDIAL BRANCH NERVES INNERVATING LUMBAR FACET JOINT Bilateral 10/23/2024    Procedure: Block-nerve-medial branch-lumbar;  Surgeon: Kamran Khan MD;  Location: [facility] OR;  Service: Pain Management;  Laterality: Bilateral;    RETINAL DETACHMENT SURGERY      THROAT SURGERY      X 2 for vocal cord cancer    TONSILLECTOMY      TRANSURETHRAL RESECTION OF PROSTATE  2004    dr cindi ryan - [facility]     No family history on file.  Vitals:    01/07/25 1305   Weight: 70.8 kg (156 lb 1.4 oz)   Height: 5' 8" (1.727 m)       Review of Systems   Constitutional:  Negative for chills, diaphoresis, fatigue, fever and unexpected weight change.   HENT:  Negative for trouble swallowing.    Eyes:  Negative for visual " disturbance.   Respiratory:  Negative for shortness of breath.    Cardiovascular:  Negative for chest pain.   Gastrointestinal:  Negative for abdominal pain, constipation, diarrhea, nausea and vomiting.   Genitourinary:  Negative for difficulty urinating.   Musculoskeletal:  Negative for arthralgias, back pain, gait problem, joint swelling, myalgias, neck pain and neck stiffness.   Neurological:  Negative for dizziness, speech difficulty, weakness, light-headedness, numbness and headaches.          Objective:      Physical Exam  Neurological:      Mental Status: He is alert and oriented to person, place, and time.             Assessment:       1. Chronic bilateral low back pain with left-sided sciatica           Plan:     Improved to his satisfaction status post interlaminar injection L3-4.  We can repeat that procedure as needed.  Follow up here as needed.      Chronic bilateral low back pain with left-sided sciatica

## 2025-01-30 ENCOUNTER — TELEPHONE (OUTPATIENT)
Dept: CARDIOLOGY | Facility: CLINIC | Age: OVER 89
End: 2025-01-30
Payer: MEDICARE

## 2025-01-30 NOTE — TELEPHONE ENCOUNTER
----- Message from Fabiola sent at 1/30/2025  4:01 PM CST -----  Regarding: FW: Needs return call    ----- Message -----  From: Aylin Gautam  Sent: 1/30/2025  11:30 AM CST  To: [facility] Staff  Subject: Needs return call                                Type:  Sooner Appointment Request    Caller is requesting a sooner appointment.  Caller declined first available appointment listed below.  Caller will not accept being placed on the waitlist and is requesting a message be sent to doctor.    Name of Caller:  Pt Wife  When is the first available appointment?  March 10th  Symptoms:  states that feet are swollen, dizzy, high bp  Would the patient rather a call back or a response via [facility]? Javier back  Best Call Back Number:  [phone]      Additional Information:  Please advsie

## 2025-02-04 ENCOUNTER — HOSPITAL ENCOUNTER (EMERGENCY)
Facility: HOSPITAL | Age: OVER 89
Discharge: HOME | End: 2025-02-04
Attending: EMERGENCY MEDICINE
Payer: MEDICARE

## 2025-02-04 VITALS
HEIGHT: 68 IN | SYSTOLIC BLOOD PRESSURE: 184 MMHG | RESPIRATION RATE: 17 BRPM | OXYGEN SATURATION: 99 % | HEART RATE: 72 BPM | BODY MASS INDEX: 23.49 KG/M2 | TEMPERATURE: 99 F | DIASTOLIC BLOOD PRESSURE: 86 MMHG | WEIGHT: 155 LBS

## 2025-02-04 DIAGNOSIS — R53.82 CHRONIC FATIGUE AND MALAISE: Primary | ICD-10-CM

## 2025-02-04 DIAGNOSIS — R53.81 CHRONIC FATIGUE AND MALAISE: Primary | ICD-10-CM

## 2025-02-04 DIAGNOSIS — I25.10 ATHEROSCLEROSIS OF NATIVE CORONARY ARTERY OF NATIVE HEART WITHOUT ANGINA PECTORIS: ICD-10-CM

## 2025-02-04 LAB
ALBUMIN SERPL BCP-MCNC: 3.8 G/DL (ref 3.5–5.2)
ALP SERPL-CCNC: 53 U/L (ref 55–135)
ALT SERPL W/O P-5'-P-CCNC: 24 U/L (ref 10–44)
AMPHET+METHAMPHET UR QL: NEGATIVE
ANION GAP SERPL CALC-SCNC: 7 MMOL/L (ref 8–16)
AST SERPL-CCNC: 16 U/L (ref 10–40)
BARBITURATES UR QL SCN>200 NG/ML: NEGATIVE
BASOPHILS # BLD AUTO: 0.05 K/UL (ref 0–0.2)
BASOPHILS NFR BLD: 0.9 % (ref 0–1.9)
BENZODIAZ UR QL SCN>200 NG/ML: NEGATIVE
BILIRUB SERPL-MCNC: 0.6 MG/DL (ref 0.1–1)
BILIRUB UR QL STRIP: NEGATIVE
BILIRUB UR QL STRIP: NEGATIVE
BNP SERPL-MCNC: 64 PG/ML (ref 0–99)
BUN SERPL-MCNC: 20 MG/DL (ref 8–23)
BZE UR QL SCN: NEGATIVE
CALCIUM SERPL-MCNC: 8.8 MG/DL (ref 8.7–10.5)
CANNABINOIDS UR QL SCN: NEGATIVE
CHLORIDE SERPL-SCNC: 106 MMOL/L (ref 95–110)
CK SERPL-CCNC: 81 U/L (ref 20–200)
CLARITY UR: CLEAR
CLARITY UR: CLEAR
CO2 SERPL-SCNC: 26 MMOL/L (ref 23–29)
COLOR UR: YELLOW
COLOR UR: YELLOW
CREAT SERPL-MCNC: 1.2 MG/DL (ref 0.5–1.4)
CREAT UR-MCNC: 120.5 MG/DL (ref 23–375)
DIFFERENTIAL METHOD BLD: ABNORMAL
EOSINOPHIL # BLD AUTO: 0.1 K/UL (ref 0–0.5)
EOSINOPHIL NFR BLD: 1.4 % (ref 0–8)
ERYTHROCYTE [DISTWIDTH] IN BLOOD BY AUTOMATED COUNT: 13.1 % (ref 11.5–14.5)
EST. GFR  (NO RACE VARIABLE): 57.8 ML/MIN/1.73 M^2
GLUCOSE SERPL-MCNC: 87 MG/DL (ref 70–110)
GLUCOSE UR QL STRIP: NEGATIVE
GLUCOSE UR QL STRIP: NEGATIVE
HCT VFR BLD AUTO: 39.4 % (ref 40–54)
HCV AB SERPL QL IA: NEGATIVE
HGB BLD-MCNC: 12.8 G/DL (ref 14–18)
HGB UR QL STRIP: NEGATIVE
HGB UR QL STRIP: NEGATIVE
HIV 1+2 AB+HIV1 P24 AG SERPL QL IA: NEGATIVE
IMM GRANULOCYTES # BLD AUTO: 0.02 K/UL (ref 0–0.04)
IMM GRANULOCYTES NFR BLD AUTO: 0.3 % (ref 0–0.5)
KETONES UR QL STRIP: NEGATIVE
KETONES UR QL STRIP: NEGATIVE
LEUKOCYTE ESTERASE UR QL STRIP: NEGATIVE
LEUKOCYTE ESTERASE UR QL STRIP: NEGATIVE
LIPASE SERPL-CCNC: 43 U/L (ref 4–60)
LYMPHOCYTES # BLD AUTO: 1.9 K/UL (ref 1–4.8)
LYMPHOCYTES NFR BLD: 33.3 % (ref 18–48)
MAGNESIUM SERPL-MCNC: 2 MG/DL (ref 1.6–2.6)
MCH RBC QN AUTO: 29.6 PG (ref 27–31)
MCHC RBC AUTO-ENTMCNC: 32.5 G/DL (ref 32–36)
MCV RBC AUTO: 91 FL (ref 82–98)
MONOCYTES # BLD AUTO: 0.7 K/UL (ref 0.3–1)
MONOCYTES NFR BLD: 11.4 % (ref 4–15)
NEUTROPHILS # BLD AUTO: 3.1 K/UL (ref 1.8–7.7)
NEUTROPHILS NFR BLD: 52.7 % (ref 38–73)
NITRITE UR QL STRIP: NEGATIVE
NITRITE UR QL STRIP: NEGATIVE
NRBC BLD-RTO: 0 /100 WBC
OPIATES UR QL SCN: NEGATIVE
PCP UR QL SCN>25 NG/ML: NEGATIVE
PH UR STRIP: 7 [PH] (ref 5–8)
PH UR STRIP: 7 [PH] (ref 5–8)
PLATELET # BLD AUTO: 145 K/UL (ref 150–450)
PMV BLD AUTO: 10 FL (ref 9.2–12.9)
POTASSIUM SERPL-SCNC: 4.5 MMOL/L (ref 3.5–5.1)
PROT SERPL-MCNC: 6.5 G/DL (ref 6–8.4)
PROT UR QL STRIP: NEGATIVE
PROT UR QL STRIP: NEGATIVE
RBC # BLD AUTO: 4.32 M/UL (ref 4.6–6.2)
SODIUM SERPL-SCNC: 139 MMOL/L (ref 136–145)
SP GR UR STRIP: 1.01 (ref 1–1.03)
SP GR UR STRIP: 1.01 (ref 1–1.03)
TOXICOLOGY INFORMATION: NORMAL
TROPONIN I SERPL HS-MCNC: 3.6 PG/ML (ref 0–14.9)
TROPONIN I SERPL HS-MCNC: 4.4 PG/ML (ref 0–14.9)
TSH SERPL DL<=0.005 MIU/L-ACNC: 0.67 UIU/ML (ref 0.34–5.6)
URN SPEC COLLECT METH UR: NORMAL
URN SPEC COLLECT METH UR: NORMAL
UROBILINOGEN UR STRIP-ACNC: NEGATIVE EU/DL
UROBILINOGEN UR STRIP-ACNC: NEGATIVE EU/DL
WBC # BLD AUTO: 5.79 K/UL (ref 3.9–12.7)

## 2025-02-04 PROCEDURE — 87389 HIV-1 AG W/HIV-1&-2 AB AG IA: CPT | Performed by: EMERGENCY MEDICINE

## 2025-02-04 PROCEDURE — 84443 ASSAY THYROID STIM HORMONE: CPT | Performed by: EMERGENCY MEDICINE

## 2025-02-04 PROCEDURE — 83735 ASSAY OF MAGNESIUM: CPT | Performed by: EMERGENCY MEDICINE

## 2025-02-04 PROCEDURE — 85025 COMPLETE CBC W/AUTO DIFF WBC: CPT | Performed by: PHYSICIAN ASSISTANT

## 2025-02-04 PROCEDURE — 93005 ELECTROCARDIOGRAM TRACING: CPT | Performed by: INTERNAL MEDICINE

## 2025-02-04 PROCEDURE — 86803 HEPATITIS C AB TEST: CPT | Performed by: EMERGENCY MEDICINE

## 2025-02-04 PROCEDURE — 93010 ELECTROCARDIOGRAM REPORT: CPT | Mod: ,,, | Performed by: INTERNAL MEDICINE

## 2025-02-04 PROCEDURE — 80053 COMPREHEN METABOLIC PANEL: CPT | Performed by: PHYSICIAN ASSISTANT

## 2025-02-04 PROCEDURE — 99285 EMERGENCY DEPT VISIT HI MDM: CPT | Mod: 25

## 2025-02-04 PROCEDURE — 81003 URINALYSIS AUTO W/O SCOPE: CPT | Performed by: PHYSICIAN ASSISTANT

## 2025-02-04 PROCEDURE — 80307 DRUG TEST PRSMV CHEM ANLYZR: CPT | Performed by: EMERGENCY MEDICINE

## 2025-02-04 PROCEDURE — 83690 ASSAY OF LIPASE: CPT | Performed by: EMERGENCY MEDICINE

## 2025-02-04 PROCEDURE — 84484 ASSAY OF TROPONIN QUANT: CPT | Performed by: EMERGENCY MEDICINE

## 2025-02-04 PROCEDURE — 83880 ASSAY OF NATRIURETIC PEPTIDE: CPT | Performed by: PHYSICIAN ASSISTANT

## 2025-02-04 PROCEDURE — 82550 ASSAY OF CK (CPK): CPT | Performed by: EMERGENCY MEDICINE

## 2025-02-04 PROCEDURE — 25500020 PHARM REV CODE 255: Performed by: EMERGENCY MEDICINE

## 2025-02-04 RX ORDER — ALPRAZOLAM 0.25 MG/1
0.25 TABLET ORAL DAILY PRN
COMMUNITY

## 2025-02-04 RX ORDER — LANCETS 33 GAUGE
EACH MISCELLANEOUS
COMMUNITY
Start: 2024-11-04 | End: 2025-02-13

## 2025-02-04 RX ORDER — ZINC GLUCONATE 100 MG
1 TABLET ORAL DAILY
COMMUNITY

## 2025-02-04 RX ORDER — FUROSEMIDE 20 MG/1
20 TABLET ORAL DAILY
COMMUNITY
Start: 2024-07-17

## 2025-02-04 RX ORDER — FLUTICASONE PROPIONATE 50 MCG
1 SPRAY, SUSPENSION (ML) NASAL DAILY
COMMUNITY
Start: 2024-10-21

## 2025-02-04 RX ORDER — CALCIUM CITRATE/VITAMIN D3 200MG-6.25
TABLET ORAL
COMMUNITY
Start: 2024-11-04 | End: 2025-02-13

## 2025-02-04 RX ORDER — CHOLECALCIFEROL (VITAMIN D3) 50 MCG
1 TABLET ORAL DAILY
COMMUNITY
Start: 2024-11-25

## 2025-02-04 RX ADMIN — IOHEXOL 100 ML: 350 INJECTION, SOLUTION INTRAVENOUS at 01:02

## 2025-02-04 NOTE — ED PROVIDER NOTES
Encounter Date: 2/4/2025       History     Chief Complaint   Patient presents with    Dizziness    Fatigue     Pt c/o headaches, dizziness, and feet swelling x 5 months.     89-year-old male with a history chronic back pain, depression, GERD, hypertension, hyperlipidemia, dementia presents with several complaints as related by his wife.  His wife reports that symptoms of complaint have been going on since September 2024 and occurred after a COVID-19 infection.  Fatigue has been ongoing since them, decreased activity level has been going on since then he has also had episodes of shortness of breath with exertion that also have been going on for several months now without any significant change or worsening.  He has not had chest pain.  He has had intermittent bilateral lower extremity edema as well although none currently.  Wife reports that she called to get an appointment with Cardiology today and was told to come to the emergency room instead.  It is unclear if the patient has actually been seen by his primary care provider for these problems either. Has not seen neurology either.  Wife reports he has been having intermittent headaches in the morning but no severe headaches.  No sudden onset of any severe headaches.  Episodic lightheadedness as well without syncope.  Denies any vertiginous type symptoms.  Has been able to walk and ambulate.  Denies fever, cough or cold symptoms, denies weight loss, denies melena hematochezia or any GI bleeding.  Denies abdominal pain nausea vomiting or diarrhea.  Urination has been normal.  Appetite has been normal.  No weight loss.  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    Back pain     Depression     GERD (gastroesophageal reflux disease)     Hyperlipidemia     Hypertension     Vocal cord cancer       Past Surgical History:   Procedure Laterality Date    CATARACT EXTRACTION, BILATERAL      CHOLECYSTECTOMY      CIRCUMCISION      EPIDURAL STEROID INJECTION INTO LUMBAR SPINE N/A 2024    Procedure: Injection-steroid-epidural-lumbar;  Surgeon: Kamran Khan MD;  Location: [facility] OR;  Service: Pain Management;  Laterality: N/A;    INGUINAL HERNIA REPAIR Right     INGUINAL HERNIA REPAIR Left     INJECTION OF ANESTHETIC AGENT AROUND MEDIAL BRANCH NERVES INNERVATING LUMBAR FACET JOINT Bilateral 10/10/2024    Procedure: Block-nerve-medial branch-lumbar;  Surgeon: Kamran Khan MD;  Location: [facility] OR;  Service: Pain Management;  Laterality: Bilateral;    INJECTION OF ANESTHETIC AGENT AROUND MEDIAL BRANCH NERVES INNERVATING LUMBAR FACET JOINT Bilateral 10/23/2024    Procedure: Block-nerve-medial branch-lumbar;  Surgeon: Kamran Khan MD;  Location: [facility] OR;  Service: Pain Management;  Laterality: Bilateral;    RETINAL DETACHMENT SURGERY      THROAT SURGERY      X 2 for vocal cord cancer    TONSILLECTOMY      TRANSURETHRAL RESECTION OF PROSTATE      dr cindi ryan - [facility]     No family history on file.  Social History     Tobacco Use    Smoking status: Former     Current packs/day: 0.00     Types: Cigarettes     Quit date: 1982     Years since quittin.8    Smokeless tobacco: Never   Substance Use Topics    Alcohol use: Not Currently    Drug use: No     Review of Systems   Constitutional:  Positive for activity change and fatigue. Negative for appetite change, chills, fever and unexpected weight change.   HENT: Negative.  Negative for congestion, dental problem, ear pain and trouble swallowing.    Eyes:  Negative for photophobia, pain, redness and visual disturbance.   Respiratory:  Positive for shortness of breath (mild shortness of breath at times since September, no current shortness of breath, no sudden change or worsening in this chronic pattern.). Negative for cough, choking, wheezing and  stridor.    Cardiovascular:  Positive for leg swelling. Negative for chest pain and palpitations.   Gastrointestinal: Negative.  Negative for abdominal distention, abdominal pain, blood in stool, constipation, diarrhea, nausea, rectal pain and vomiting.   Genitourinary: Negative.  Negative for decreased urine volume, dysuria, genital sores, penile discharge, penile pain, penile swelling and urgency.   Musculoskeletal:  Negative for arthralgias, gait problem, joint swelling, neck pain and neck stiffness. Back pain: chronic back pain, has had previous epidural steroid injections, no current back pain or discomfort.  Skin: Negative.  Negative for color change, pallor and rash.   Neurological:  Positive for headaches. Negative for seizures, syncope, facial asymmetry, speech difficulty, weakness and numbness.   Psychiatric/Behavioral:  Positive for confusion (dementia, no sudden change or worsening). Negative for agitation, behavioral problems and sleep disturbance. The patient is not nervous/anxious.    All other systems reviewed and are negative.      Physical Exam     Initial Vitals [02/04/25 0858]   BP Pulse Resp Temp SpO2   (!) 174/80 72 16 98.2 °F (36.8 °C) 98 %      MAP       --         Physical Exam    Nursing note and vitals reviewed.  Constitutional: He is cooperative. He does not have a sickly appearance. He does not appear ill. No distress.   HENT:   Head: Normocephalic and atraumatic.   Nose: Nose normal. No mucosal edema or rhinorrhea. Mouth/Throat: Uvula is midline, oropharynx is clear and moist and mucous membranes are normal. No uvula swelling. No oropharyngeal exudate, posterior oropharyngeal edema or posterior oropharyngeal erythema.   Eyes: Conjunctivae, EOM and lids are normal. Pupils are equal, round, and reactive to light. Right eye exhibits no discharge. Left eye exhibits no discharge.   Neck: Trachea normal and phonation normal. Neck supple. No thyroid mass present. No stridor present. No  tracheal tenderness present. Carotid bruit is not present. No JVD present.   Normal range of motion.   Full passive range of motion without pain.     Cardiovascular:  Normal rate, regular rhythm, normal heart sounds, intact distal pulses and normal pulses.     Exam reveals no gallop, no distant heart sounds, no friction rub and no decreased pulses.       No murmur heard.  Pulmonary/Chest: Effort normal and breath sounds normal. No stridor. No respiratory distress. He has no decreased breath sounds. He has no wheezes. He has no rhonchi. He has no rales.   Abdominal: Abdomen is soft. Bowel sounds are normal. He exhibits no distension, no pulsatile midline mass and no mass. There is no abdominal tenderness. No hernia.   No right CVA tenderness.  No left CVA tenderness. There is no rebound and no guarding.   Musculoskeletal:         General: No tenderness or edema. Normal range of motion.      Right hand: Normal. Normal capillary refill. Normal pulse.      Left hand: Normal. Normal capillary refill. Normal pulse.      Cervical back: Normal, full passive range of motion without pain, normal range of motion and neck supple. No erythema, rigidity, tenderness or bony tenderness. No pain with movement, spinous process tenderness or muscular tenderness. Normal range of motion and normal range of motion. Normal.      Thoracic back: Normal. No tenderness or bony tenderness. Normal range of motion.      Lumbar back: Normal. No tenderness or bony tenderness. Normal range of motion.      Right lower leg: No tenderness. No edema.      Left lower leg: No tenderness. No edema.      Right foot: Normal. Normal capillary refill. No swelling. Normal pulse.      Left foot: Normal. Normal capillary refill. No swelling. Normal pulse.      Comments: Pulses are 2+ throughout, cap refill is less than 2 sec throughout, no edema noted, extremities are nontender throughout with full range of motion     Neurological: He is alert. He has normal  strength. No cranial nerve deficit or sensory deficit. Coordination and gait normal.   No focal deficits  Oriented to person and place   Skin: Skin is warm and dry. Capillary refill takes less than 2 seconds. No bruising, no ecchymosis, no petechiae, no purpura and no rash noted. No cyanosis or erythema. No pallor.   Psychiatric: His behavior is normal. His affect is blunt. His speech is delayed. Cognition and memory are impaired.         ED Course   Procedures  Labs Reviewed   CBC W/ AUTO DIFFERENTIAL - Abnormal       Result Value    WBC 5.79      RBC 4.32 (*)     Hemoglobin 12.8 (*)     Hematocrit 39.4 (*)     MCV 91      MCH 29.6      MCHC 32.5      RDW 13.1      Platelets 145 (*)     MPV 10.0      Immature Granulocytes 0.3      Gran # (ANC) 3.1      Immature Grans (Abs) 0.02      Lymph # 1.9      Mono # 0.7      Eos # 0.1      Baso # 0.05      nRBC 0      Gran % 52.7      Lymph % 33.3      Mono % 11.4      Eosinophil % 1.4      Basophil % 0.9      Differential Method Automated     COMPREHENSIVE METABOLIC PANEL - Abnormal    Sodium 139      Potassium 4.5      Chloride 106      CO2 26      Glucose 87      BUN 20      Creatinine 1.2      Calcium 8.8      Total Protein 6.5      Albumin 3.8      Total Bilirubin 0.6      Alkaline Phosphatase 53 (*)     AST 16      ALT 24      eGFR 57.8 (*)     Anion Gap 7 (*)    HEPATITIS C ANTIBODY    Hepatitis C Ab Negative      Narrative:     Release to patient->Immediate   HIV 1 / 2 ANTIBODY    HIV 1/2 Ag/Ab Negative      Narrative:     Release to patient->Immediate   B-TYPE NATRIURETIC PEPTIDE    BNP 64     URINALYSIS, REFLEX TO URINE CULTURE    Specimen UA Urine, Clean Catch      Color, UA Yellow      Appearance, UA Clear      pH, UA 7.0      Specific Gravity, UA 1.010      Protein, UA Negative      Glucose, UA Negative      Ketones, UA Negative      Bilirubin (UA) Negative      Occult Blood UA Negative      Nitrite, UA Negative      Urobilinogen, UA Negative      Leukocytes, UA  Negative      Narrative:     Specimen Source->Urine   LIPASE    Lipase 43     DRUG SCREEN PANEL, URINE EMERGENCY    Benzodiazepines Negative      Cocaine (Metab.) Negative      Opiate Scrn, Ur Negative      Barbiturate Screen, Ur Negative      Amphetamine Screen, Ur Negative      THC Negative      Phencyclidine Negative      Creatinine, Urine 120.5      Toxicology Information SEE COMMENT      Narrative:     Specimen Source->Urine   URINALYSIS, REFLEX TO URINE CULTURE    Specimen UA Urine, Clean Catch      Color, UA Yellow      Appearance, UA Clear      pH, UA 7.0      Specific Gravity, UA 1.010      Protein, UA Negative      Glucose, UA Negative      Ketones, UA Negative      Bilirubin (UA) Negative      Occult Blood UA Negative      Nitrite, UA Negative      Urobilinogen, UA Negative      Leukocytes, UA Negative      Narrative:     Specimen Source->Urine   CK    CPK 81     MAGNESIUM    Magnesium 2.0     TSH    TSH 0.671     TROPONIN I HIGH SENSITIVITY    Troponin I High Sensitivity 3.6     TROPONIN I HIGH SENSITIVITY    Troponin I High Sensitivity 4.4          ECG Results              EKG 12-lead (Final result)        Collection Time Result Time QRS Duration [facility]HS QTC Calculation    02/04/25 10:19:47 02/08/25 17:54:38 88 429                     Final result by Interface, Lab In [facility] (02/08/25 17:54:48)                   Narrative:    Test Reason : R60.9,    Vent. Rate :  68 BPM     Atrial Rate :  68 BPM     P-R Int : 150 ms          QRS Dur :  88 ms      QT Int : 404 ms       P-R-T Axes :  58   1  33 degrees    QTcB Int : 429 ms    Normal sinus rhythm  Normal ECG  When compared with ECG of 19-Aug-2023 13:27,  No significant change was found  Confirmed by Jose Johnson ([id number]) on 2/8/2025 5:54:37 PM    Referred By: AAAREFERRAL SELF           Confirmed By: Jose Johnson                                     EKG 12-lead (Final result)  Result time 02/11/25 13:15:00      Final result by Unknown User (02/11/25  13:15:00)                                      Imaging Results              US Lower Extremity Veins Bilateral (Final result)  Result time 02/04/25 15:30:00      Final result by Vipin Ghosh MD (02/04/25 15:30:00)                   Impression:      Negative for bilateral lower extremity DVT.      Electronically signed by: Vipin Ghosh  Date:    02/04/2025  Time:    15:30               Narrative:    CLINICAL HISTORY:  Edema, unspecified    TECHNIQUE:  Grayscale, color and spectral Doppler analysis of the bilateral lower extremity deep venous system was performed.    COMPARISON:  None    FINDINGS:  There is normal compressibility, with normal flow by color and spectral Doppler analysis in the bilateral lower extremity deep venous system, with no evidence of deep venous thrombosis.                                       CTA Chest Non-Coronary (PE Studies) (Final result)  Result time 02/04/25 14:27:14      Final result by Nikolas Jordan MD (02/04/25 14:27:14)                   Impression:      1. Negative for pulmonary thromboembolism.  2. Scattered calcified pleural plaques, including the largest in the right middle lobe accounting for the recently described radiographic abnormality.  No suspicious pulmonary nodules or masses.  3. Cardiomegaly and coronary arterial calcifications.  4. Multiple right renal cysts with internal calcifications.      Electronically signed by: Nikolas Jordan  Date:    02/04/2025  Time:    14:27               Narrative:    EXAMINATION:  CTA CHEST NON CORONARY (PE STUDIES)    CLINICAL HISTORY:  Pulmonary embolism (PE) suspected, high prob; abnormal chest radiograph.    TECHNIQUE:  Thin axial imaging through the chest was performed with 100 mL Omnipaque 350 IV contrast, with sagittal and coronal reformatted images and MIP reconstructions performed, with images stored in the patient's permanent electronic medical record.    FINDINGS:  Comparison to multiple prior exams.  There are no  pulmonary arterial filling defects to suggest pulmonary thromboembolism.  The central pulmonary arteries normal in caliber.    Scattered calcified plaque involves normal-caliber thoracic aorta, with the heart borderline enlarged, and no pericardial effusion.  There are scattered coronary arterial calcifications, with calcified mediastinal lymph nodes.  No enlarged mediastinal or hilar lymph nodes.    There are scattered calcified pleural plaques in both lungs, including the largest of these in the right middle lobe anteriorly measuring 24 mm, accounting for the radiographic abnormality.  There are no pulmonary parenchymal nodules or masses.    Scattered reticular and ground-glass densities in both lungs are nonspecific, with no consolidation or evidence of pulmonary edema.  The central airways are patent.  There are bilateral lower lobe parenchymal calcifications, with no pneumothorax.    Images of the upper abdomen show multiple hypoattenuating right renal masses with peripheral calcifications, suggesting cysts.  No acute fractures or destructive osseous lesions.                                       MRI Brain Without Contrast (Final result)  Result time 02/04/25 13:35:36      Final result by Elliott Cano MD (02/04/25 13:35:36)                   Impression:      1.  No finding to suggest an acute infarct or intracranial bleed.    2.  Additional chronic/involutional findings as above.      Electronically signed by: Elliott Cano  Date:    02/04/2025  Time:    13:35               Narrative:    CLINICAL HISTORY:  ([id number])89 y/o  (1935) M    Dizziness, persistent/recurrent, cardiac or vascular cause suspected;    TECHNIQUE:  (A#[id number], exam time 2/4/2025 13:26)    MRI BRAIN WITHOUT CONTRAST [id number]    Multiplanar multisequence MRI of the brain was performed.    CONTRAST:    No contrast was administered.    COMPARISON:  None available.    FINDINGS:  No hydrocephalus, herniation or midline shift and  the basal/suprasellar cisterns are patent.  Diffusion imaging does not indicate acute or recent stroke. No bleed is seen on the gradient reversal images.    There is moderate periventricular and scattered nodular deep cerebral white matter T2 FLAIR hyperintensity, a nonspecific finding in this age group which can be seen in a diffuse white matter process, but one which is most commonly associated with small vessel ischemic disease. There is mild cerebral/cerebellar atrophy for age.    There is mild prominence of the lateral and 3rd ventricles, a nonspecific finding which may be related to involutional atrophy or normal pressure hydrocephalus in the appropriate clinical setting.    The pituitary gland and infundibulum is normal in size without abnormal signal pattern. The craniocervical junction is unremarkable. The temporal bones, internal and external meati, and semicircular canals appear normal. The orbital contents are within normal limits noting bilateral lens replacements/cataracts surgery.  The visualized paranasal sinuses and mastoid air cells are nearly clear noting mucosal thickening in the right frontal sinus, with no air-fluid level or osseous erosion.                                       X-Ray Chest AP Portable (Final result)  Result time 02/04/25 10:21:41      Final result by Nikolas Jordan MD (02/04/25 10:21:41)                   Impression:      A 24 mm right lower lung nodular opacities nonspecific.  Further evaluation with chest CT is recommended to help exclude pulmonary nodule or neoplasm.      Electronically signed by: Nikolas Jordan  Date:    02/04/2025  Time:    10:21               Narrative:    EXAMINATION:  XR CHEST AP PORTABLE    CLINICAL HISTORY:  Dizziness and fatigue edema, unspecified    FINDINGS:  Portable chest radiograph at 10:07 hours compared to prior exams shows the cardiomediastinal silhouette and pulmonary vasculature are within normal limits.    The lungs are normally expanded,  "with no consolidation, large pleural effusion, or evidence of pulmonary edema.  There is a 24 mm nodular opacity with irregular margins in the right lower lung centrally, nonspecific.  No acute fractures or destructive osseous lesions.                                       Medications   iohexoL (OMNIPAQUE 350) injection 100 mL (100 mLs Intravenous Given 2/4/25 9459)     Medical Decision Making  Emergent evaluation of an 89-year-old male with fatigue, shortness of breath, headaches, leg edema that have been going on since September although or not currently occurring.  Called to make an appointment with Cardiology and it was suggested that he come to the emergency room to make sure there was no acute or urgent process currently.  The patient is currently neurologically intact and hemodynamically stable other than hypertension.  Wife reports he has a history of "white coat" syndrome and frequently has hypertension when seen by physicians although this does not correlate with home blood pressures.  Patient has chronic symptoms ongoing since September after COVID with no sudden change or worsening.  No chest pain.  Workup in the emergency room is not demonstrating evidence of an acute emergent process that would necessitate admission for further evaluation.  MRI of the brain with no evidence of stroke, CTA of the chest with no pulmonary embolism.  Incidental findings discussed with need for follow up discussed.  Metabolic workup is unremarkable.  BNP is not elevated.  I have called Dr. Johnson's office and the patient does have an upcoming appointment to see Cardiology.  It is unclear if the patient has primary care and I will ascertain this before discharge.    Amount and/or Complexity of Data Reviewed  Labs: ordered. Decision-making details documented in ED Course.  Radiology: ordered.    Risk  Prescription drug management.               ED Course as of 03/14/25 1311   Tue Feb 04, 2025   1525 Leukocyte Esterase, UA: " Negative [AR]   1526 HIV 1/2 Ag/Ab: Negative [AR]   1526 Hepatitis C Ab: Negative [AR]   1526 Sodium: 139 [AR]   1526 Potassium: 4.5 [AR]   1526 Chloride: 106 [AR]   1526 CO2: 26 [AR]   1526 Glucose: 87 [AR]   1526 BUN: 20 [AR]   1526 Creatinine: 1.2 [AR]   1526 PROTEIN TOTAL: 6.5 [AR]   1526 Calcium: 8.8 [AR]   1526 ALP(!): 53 [AR]   1526 BILIRUBIN TOTAL: 0.6 [AR]   1526 Troponin I High Sensitivity: 3.6 [AR]   1526 CPK: 81 [AR]   1526 WBC: 5.79 [AR]   1526 Hemoglobin(!): 12.8 [AR]   1526 Hematocrit(!): 39.4 [AR]   1647 Troponin I High Sensitivity: 4.4 [AR]      ED Course User Index  [AR] Claudia Pearce MD                           Clinical Impression:  Final diagnoses:  [R53.82, R53.81] Chronic fatigue and malaise (Primary)  [I25.10] Atherosclerosis of native coronary artery of native heart without angina pectoris          ED Disposition Condition    Discharge           ED Prescriptions    None       Follow-up Information       Follow up With Specialties Details Why Contact Info    Feli Marin MD Internal Medicine Schedule an appointment as soon as possible for a visit in 2 days  [address]  [address]  [place] [address]  [phone]      Jose Johnson MD Interventional Cardiology, Cardiology  Keep your upcoming appointment. [address]  [address]  [facility]  [place] [address]  [phone]               Claudia Pearce MD  03/14/25 1311

## 2025-02-04 NOTE — FIRST PROVIDER EVALUATION
Emergency Department TeleTriage Encounter Note      CHIEF COMPLAINT    Chief Complaint   Patient presents with    Dizziness    Fatigue     Pt c/o headaches, dizziness, and feet swelling x 5 months.       VITAL SIGNS   Initial Vitals [02/04/25 0858]   BP Pulse Resp Temp SpO2   (!) 174/80 72 16 98.2 °F (36.8 °C) 98 %      MAP       --            ALLERGIES    Review of patient's allergies indicates:   Allergen Reactions    Codeine Nausea Only       PROVIDER TRIAGE NOTE  Patient was advised by pcp to come to the ED for evaluation of edema, severe fatigue, feeling off-balance x5 months after having covid. No new symptoms today. No acute neuro deficit.       ORDERS  Labs Reviewed   HEPATITIS C ANTIBODY   HIV 1 / 2 ANTIBODY       ED Orders (720h ago, onward)      Start Ordered     Status Ordering Provider    02/04/25 0900 02/04/25 0900  Hepatitis C Antibody  STAT         Ordered ANUPAMA MIGUEL    02/04/25 0900 02/04/25 0900  HIV 1/2 Ag/Ab (4th Gen)  STAT         Ordered ANUPAMA MIGUEL              Virtual Visit Note: The provider triage portion of this emergency department evaluation and documentation was performed via [employer], a HIPAA-compliant telemedicine application, in concert with a tele-presenter in the room. A face to face patient evaluation with one of my colleagues will occur once the patient is placed in an emergency department room.      DISCLAIMER: This note was prepared with [employer] voice recognition transcription software. Garbled syntax, mangled pronouns, and other bizarre constructions may be attributed to that software system.

## 2025-02-04 NOTE — DISCHARGE INSTRUCTIONS
Please read and follow discharge instructions and return precautions.  Rest, avoid any strenuous activity, over exertion or overheating.  Keep well hydrated.  Alternate water and Pedialyte for hydration.  Important to follow up closely outpatient with your primary care provider.  Your blood pressure was noted to be elevated in the emergency room.  Please see your physician in the next 1-2 days regarding this.  Important to keep your upcoming appointment with Cardiology.  Important to follow up closely outpatient with Neurology.  Return immediately if you develop new or worsening symptoms or if you have new problems or concerns.

## 2025-02-06 ENCOUNTER — TELEPHONE (OUTPATIENT)
Dept: CARDIOLOGY | Facility: HOSPITAL | Age: OVER 89
End: 2025-02-06

## 2025-02-07 ENCOUNTER — HOSPITAL ENCOUNTER (OUTPATIENT)
Dept: RADIOLOGY | Facility: HOSPITAL | Age: OVER 89
Discharge: HOME | End: 2025-02-07
Attending: EMERGENCY MEDICINE
Payer: MEDICARE

## 2025-02-07 ENCOUNTER — TELEPHONE (OUTPATIENT)
Dept: CARDIOLOGY | Facility: HOSPITAL | Age: OVER 89
End: 2025-02-07

## 2025-02-07 ENCOUNTER — CLINICAL SUPPORT (OUTPATIENT)
Dept: CARDIOLOGY | Facility: HOSPITAL | Age: OVER 89
End: 2025-02-07
Attending: EMERGENCY MEDICINE
Payer: MEDICARE

## 2025-02-07 DIAGNOSIS — I25.10 ATHEROSCLEROSIS OF NATIVE CORONARY ARTERY OF NATIVE HEART WITHOUT ANGINA PECTORIS: ICD-10-CM

## 2025-02-07 LAB
CV PHARM DOSE: 0.4 MG
CV STRESS BASE HR: 64 BPM
DIASTOLIC BLOOD PRESSURE: 85 MMHG
OHS CV CPX 1 MINUTE RECOVERY HEART RATE: 76 BPM
OHS CV CPX 85 PERCENT MAX PREDICTED HEART RATE MALE: 111
OHS CV CPX MAX PREDICTED HEART RATE: 131
OHS CV CPX PATIENT IS FEMALE: 0
OHS CV CPX PATIENT IS MALE: 1
OHS CV CPX PEAK DIASTOLIC BLOOD PRESSURE: 78 MMHG
OHS CV CPX PEAK HEAR RATE: 82 BPM
OHS CV CPX PEAK RATE PRESSURE PRODUCT: NORMAL
OHS CV CPX PEAK SYSTOLIC BLOOD PRESSURE: 170 MMHG
OHS CV CPX PERCENT MAX PREDICTED HEART RATE ACHIEVED: 63
OHS CV CPX RATE PRESSURE PRODUCT PRESENTING: NORMAL
SYSTOLIC BLOOD PRESSURE: 168 MMHG

## 2025-02-07 PROCEDURE — 63600175 PHARM REV CODE 636 W HCPCS: Performed by: EMERGENCY MEDICINE

## 2025-02-07 PROCEDURE — 78452 HT MUSCLE IMAGE SPECT MULT: CPT | Mod: TC

## 2025-02-07 PROCEDURE — 93016 CV STRESS TEST SUPVJ ONLY: CPT | Mod: ,,, | Performed by: GENERAL PRACTICE

## 2025-02-07 PROCEDURE — A9502 TC99M TETROFOSMIN: HCPCS | Performed by: EMERGENCY MEDICINE

## 2025-02-07 PROCEDURE — 93018 CV STRESS TEST I&R ONLY: CPT | Mod: ,,, | Performed by: GENERAL PRACTICE

## 2025-02-07 PROCEDURE — 93017 CV STRESS TEST TRACING ONLY: CPT

## 2025-02-07 PROCEDURE — 78452 HT MUSCLE IMAGE SPECT MULT: CPT | Mod: 26,,, | Performed by: RADIOLOGY

## 2025-02-07 RX ORDER — REGADENOSON 0.08 MG/ML
0.4 INJECTION, SOLUTION INTRAVENOUS
Status: COMPLETED | OUTPATIENT
Start: 2025-02-07 | End: 2025-02-07

## 2025-02-07 RX ADMIN — TETROFOSMIN 27.1 MILLICURIE: 1.38 INJECTION, POWDER, LYOPHILIZED, FOR SOLUTION INTRAVENOUS at 09:02

## 2025-02-07 RX ADMIN — REGADENOSON 0.4 MG: 0.08 INJECTION, SOLUTION INTRAVENOUS at 09:02

## 2025-02-07 RX ADMIN — TETROFOSMIN 11 MILLICURIE: 1.38 INJECTION, POWDER, LYOPHILIZED, FOR SOLUTION INTRAVENOUS at 09:02

## 2025-02-08 LAB
OHS QRS DURATION: 88 MS
OHS QTC CALCULATION: 429 MS

## 2025-02-13 ENCOUNTER — LAB VISIT (OUTPATIENT)
Dept: LAB | Facility: HOSPITAL | Age: OVER 89
End: 2025-02-13
Attending: INTERNAL MEDICINE
Payer: MEDICARE

## 2025-02-13 ENCOUNTER — OFFICE VISIT (OUTPATIENT)
Dept: CARDIOLOGY | Facility: CLINIC | Age: OVER 89
End: 2025-02-13
Payer: MEDICARE

## 2025-02-13 VITALS — BODY MASS INDEX: 24.66 KG/M2 | WEIGHT: 162.19 LBS | HEART RATE: 74 BPM | OXYGEN SATURATION: 99 %

## 2025-02-13 DIAGNOSIS — I10 ESSENTIAL HYPERTENSION: ICD-10-CM

## 2025-02-13 DIAGNOSIS — R05.2 SUBACUTE COUGH: ICD-10-CM

## 2025-02-13 DIAGNOSIS — I25.10 ATHEROSCLEROSIS OF NATIVE CORONARY ARTERY OF NATIVE HEART WITHOUT ANGINA PECTORIS: ICD-10-CM

## 2025-02-13 DIAGNOSIS — F41.3 OTHER MIXED ANXIETY DISORDERS: ICD-10-CM

## 2025-02-13 DIAGNOSIS — R93.89 ABNORMAL CXR: ICD-10-CM

## 2025-02-13 DIAGNOSIS — G25.9 MOVEMENT DISORDER: ICD-10-CM

## 2025-02-13 DIAGNOSIS — D50.8 OTHER IRON DEFICIENCY ANEMIA: ICD-10-CM

## 2025-02-13 DIAGNOSIS — J92.0 ASBESTOS-INDUCED PLEURAL PLAQUE: ICD-10-CM

## 2025-02-13 DIAGNOSIS — I25.10 ATHEROSCLEROSIS OF NATIVE CORONARY ARTERY OF NATIVE HEART WITHOUT ANGINA PECTORIS: Primary | ICD-10-CM

## 2025-02-13 DIAGNOSIS — R25.1 TREMOR: ICD-10-CM

## 2025-02-13 DIAGNOSIS — Z77.090 ASBESTOS EXPOSURE: ICD-10-CM

## 2025-02-13 PROBLEM — D50.9 IRON DEFICIENCY ANEMIA: Status: ACTIVE | Noted: 2025-02-13

## 2025-02-13 LAB
FERRITIN SERPL-MCNC: 108.7 NG/ML (ref 20–300)
IRON SERPL-MCNC: 103 UG/DL (ref 45–160)
OHS QRS DURATION: 84 MS
OHS QTC CALCULATION: 421 MS
SATURATED IRON: 41 % (ref 20–50)
TOTAL IRON BINDING CAPACITY: 253 UG/DL (ref 250–450)
TRANSFERRIN SERPL-MCNC: 181 MG/DL (ref 200–375)

## 2025-02-13 PROCEDURE — 1125F AMNT PAIN NOTED PAIN PRSNT: CPT | Mod: CPTII,S$GLB,, | Performed by: NURSE PRACTITIONER

## 2025-02-13 PROCEDURE — 82728 ASSAY OF FERRITIN: CPT | Performed by: NURSE PRACTITIONER

## 2025-02-13 PROCEDURE — 93000 ELECTROCARDIOGRAM COMPLETE: CPT | Mod: S$GLB,,, | Performed by: INTERNAL MEDICINE

## 2025-02-13 PROCEDURE — 3288F FALL RISK ASSESSMENT DOCD: CPT | Mod: CPTII,S$GLB,, | Performed by: NURSE PRACTITIONER

## 2025-02-13 PROCEDURE — 36415 COLL VENOUS BLD VENIPUNCTURE: CPT | Performed by: NURSE PRACTITIONER

## 2025-02-13 PROCEDURE — 99999 PR PBB SHADOW E&M-EST. PATIENT-LVL III: CPT | Mod: PBBFAC,,, | Performed by: NURSE PRACTITIONER

## 2025-02-13 PROCEDURE — 1159F MED LIST DOCD IN RCRD: CPT | Mod: CPTII,S$GLB,, | Performed by: NURSE PRACTITIONER

## 2025-02-13 PROCEDURE — 99205 OFFICE O/P NEW HI 60 MIN: CPT | Mod: 25,S$GLB,, | Performed by: NURSE PRACTITIONER

## 2025-02-13 PROCEDURE — 83540 ASSAY OF IRON: CPT | Performed by: NURSE PRACTITIONER

## 2025-02-13 PROCEDURE — 1101F PT FALLS ASSESS-DOCD LE1/YR: CPT | Mod: CPTII,S$GLB,, | Performed by: NURSE PRACTITIONER

## 2025-02-13 RX ORDER — SERTRALINE HYDROCHLORIDE 25 MG/1
25 TABLET, FILM COATED ORAL DAILY
Qty: 30 TABLET | Refills: 11 | Status: SHIPPED | OUTPATIENT
Start: 2025-02-13 | End: 2026-02-13

## 2025-02-13 NOTE — PROGRESS NOTES
Subjective:    Patient ID:  Bernardo Oliveira is a 89 y.o. male who presents for follow-up   Chief Complaint   Patient presents with    Hospital Follow Up    Fatigue       HPI:      Mr. Oliveira is 89-year-old male patient with a past medical history significant for hypertension, dyslipidemia, white coat syndrome, anxiety, depression, back pain and vocal cord cancer.  He has been in his normal state of health since about September after he had COVID infection.  Wife states he has not been the same since.  Is walking slowly he is more fatigued and tired.  He was recommended to go to the hospital and at that time he did do a stress test that shows no reversible ischemia.  Echocardiogram was not done.  BNP however is normal.  He does have some edema to his lower extremities which seems to be dependent as he is not as active as he once was.  Today in office he is accompanied by his wife.  He denies chest pain he denies shortness of breath.  His main complaint is fatigue off balance and some dizziness at times.  I have made him walk he has a staggering gait.  He has a tremor to his right arm most notably also in the left not as pronounced.  All testing reviewed from hospital he does have some anemia.        CTA     1. Negative for pulmonary thromboembolism.  2. Scattered calcified pleural plaques, including the largest in the right middle lobe accounting for the recently described radiographic abnormality.  No suspicious pulmonary nodules or masses.  3. Cardiomegaly and coronary arterial calcifications.  4. Multiple right renal cysts with internal calcifications.        CXR  Impression:     A 24 mm right lower lung nodular opacities nonspecific.  Further evaluation with chest CT is recommended to help exclude pulmonary nodule or neoplasm.      Review of patient's allergies indicates:   Allergen Reactions    Codeine Nausea Only       Past Medical History:   Diagnosis Date    Anxiety     Asbestos-induced pleural plaque  2020    Back pain     Depression     GERD (gastroesophageal reflux disease)     Hyperlipidemia     Hypertension     Vocal cord cancer      Past Surgical History:   Procedure Laterality Date    CATARACT EXTRACTION, BILATERAL      CHOLECYSTECTOMY      CIRCUMCISION      EPIDURAL STEROID INJECTION INTO LUMBAR SPINE N/A 2024    Procedure: Injection-steroid-epidural-lumbar;  Surgeon: Kamran Khan MD;  Location: [facility];  Service: Pain Management;  Laterality: N/A;    INGUINAL HERNIA REPAIR Right     INGUINAL HERNIA REPAIR Left     INJECTION OF ANESTHETIC AGENT AROUND MEDIAL BRANCH NERVES INNERVATING LUMBAR FACET JOINT Bilateral 10/10/2024    Procedure: Block-nerve-medial branch-lumbar;  Surgeon: Kamran Khan MD;  Location: [facility] OR;  Service: Pain Management;  Laterality: Bilateral;    INJECTION OF ANESTHETIC AGENT AROUND MEDIAL BRANCH NERVES INNERVATING LUMBAR FACET JOINT Bilateral 10/23/2024    Procedure: Block-nerve-medial branch-lumbar;  Surgeon: Kamran Khan MD;  Location: [facility];  Service: Pain Management;  Laterality: Bilateral;    RETINAL DETACHMENT SURGERY      THROAT SURGERY      X 2 for vocal cord cancer    TONSILLECTOMY      TRANSURETHRAL RESECTION OF PROSTATE      dr cindi ryan - [facility]     Social History     Tobacco Use    Smoking status: Former     Current packs/day: 0.00     Types: Cigarettes     Quit date: 1982     Years since quittin.7    Smokeless tobacco: Never   Substance Use Topics    Alcohol use: Not Currently    Drug use: No     No family history on file.     Review of Systems:   + Fatigue and tiredness  +off balance         Objective:        Vitals:    25 0804   Pulse: 74       Lab Results   Component Value Date    WBC 5.79 2025    HGB 12.8 (L) 2025    HCT 39.4 (L) 2025     (L) 2025    CHOL 165 2022    TRIG 62 2022    HDL 95 (H) 2022    ALT 24 2025    AST 16 2025     2025    K 4.5  02/04/2025     02/04/2025    CREATININE 1.2 02/04/2025    BUN 20 02/04/2025    CO2 26 02/04/2025    TSH 0.671 02/04/2025    PSA 1.0 10/31/2022        ECHOCARDIOGRAM RESULTS  No results found for this or any previous visit.        CURRENT/PREVIOUS VISIT EKG  Results for orders placed or performed during the hospital encounter of 02/04/25   EKG 12-lead    Collection Time: 02/04/25 10:19 AM   Result Value Ref Range    QRS Duration 88 ms    [facility] QTC Calculation 429 ms    Narrative    Test Reason : R60.9,    Vent. Rate :  68 BPM     Atrial Rate :  68 BPM     P-R Int : 150 ms          QRS Dur :  88 ms      QT Int : 404 ms       P-R-T Axes :  58   1  33 degrees    QTcB Int : 429 ms    Normal sinus rhythm  Normal ECG  When compared with ECG of 19-Aug-2023 13:27,  No significant change was found  Confirmed by Jose Johnson ([id number]) on 2/8/2025 5:54:37 PM    Referred By: AAAREFERRAL SELF           Confirmed By: Jose Johnson     No valid procedures specified.   Results for orders placed in visit on 02/07/25    Nuclear Stress Test    Interpretation Summary    The ECG portion of the study is negative for ischemia.    The patient reported no chest pain during the stress test.    There were no arrhythmias during stress.    The nuclear portion of this study will be reported separately.      FINDINGS:  Best appreciated on horizontal long-axis images, there is foreshortening of the left ventricular septum, more pronounced on stress images as compared to the resting study.  While likely artifactual, left bundle branch block or septal reversible ischemia are not absolutely excluded.  Uptake throughout the remainder of the left ventricular myocardium is normal.     Chamber size is normal.  No wall motion abnormality is identified.  Estimated ejection fraction is 62%.     Impression:     1. Foreshortened appearance of the left ventricular septum on pharmacologic stress images, likely artifactual.  Septal reversible ischemia or  bundle branch block are considered less likely differential possibilities.  2. Otherwise normal exam.  3. Estimated ejection fraction is 62%.              Physical Exam:  CONSTITUTIONAL: No fever, no chills   HEENT: Normocephalic, atraumatic, wearing glasses      NECK:  No JVD no carotid bruit  CVS: S1S2+, RRR, no murmurs,   LUNGS: Rhochi clears with coughing  ABDOMEN: Soft, NT, BS+  EXTREMITIES: No cyanosis, mild edema noted to bilateral ankles   NEURO: AAO X 3        Medication List with Changes/Refills   New Medications    SERTRALINE (ZOLOFT) 25 MG TABLET    Take 1 tablet (25 mg total) by mouth once daily.   Current Medications    ALPRAZOLAM (XANAX) 0.25 MG TABLET    Take 0.25 mg by mouth daily as needed for Anxiety.    ASCORBIC ACID, VITAMIN C, (VITAMIN C) 100 MG TABLET    Take 100 mg by mouth once daily.    CHOLECALCIFEROL, VITAMIN D3, (VITAMIN D3) 50 MCG (2,000 UNIT) TAB    Take 1 tablet by mouth once daily.    FLUTICASONE PROPIONATE (FLONASE) 50 MCG/ACTUATION NASAL SPRAY    1 spray by Each Nostril route once daily.    FUROSEMIDE (LASIX) 20 MG TABLET    Take 20 mg by mouth once daily.    METOPROLOL SUCCINATE (TOPROL-XL) 25 MG 24 HR TABLET    TAKE 1 TABLET EVERY DAY    PANTOPRAZOLE (PROTONIX) 40 MG TABLET    TAKE 1 TABLET EVERY MORNING    VALSARTAN (DIOVAN) 160 MG TABLET    TAKE 1 TABLET EVERY DAY    ZINC GLUCONATE 100 MG TAB    Take 1 tablet by mouth once daily.   Discontinued Medications    ATORVASTATIN (LIPITOR) 40 MG TABLET    TAKE 1 TABLET EVERY DAY    MONTELUKAST (SINGULAIR) 10 MG TABLET    Take 10 mg by mouth once daily.    TRUE METRIX GLUCOSE TEST STRIP STRP    SMARTSIG:Via Meter    TRUEPLUS LANCETS 33 GAUGE MISC                 Assessment:       1. Atherosclerosis of native coronary artery of native heart without angina pectoris    2. Other iron deficiency anemia    3. Subacute cough    4. Movement disorder    5. Tremor    6. Asbestos exposure    7. Essential hypertension    8. Abnormal CXR    9. Other  mixed anxiety disorders    10. Asbestos-induced pleural plaque         Plan:     WHILE IN THE ROOM WITH THE PATIENT I CALLED DR. JOHNSON AND DISCUSSED THE PLAN OF CARE      1. Because of his pulmonary history and abnormal chest x-ray and CT will obtain PFT and 6 minute walk test   2.Will start Zoloft 25 mg daily as patient may have some depression going on as well   3. Neuro consult with Dr. Reeves for evaluation as he has a staggering gait and tremors   4. White coat syndrome today however at home blood pressure is controlled  5. Echocardiogram as patient had cardiomegaly seen on x-ray and has edema to bilateral legs however I believe this could be consistent with dependency    6. Testing explained to patient and his wife   7. Anemia workup including iron profile oxygen sat and TIBC done  Follow-up in the office in 4 weeks with Dr. Johnson      Problem List Items Addressed This Visit       Abnormal CXR    Asbestos exposure    Current Assessment & Plan     Obtain CT chest with out contrast and 6 minute walk test along with PFTs         Essential hypertension    Current Assessment & Plan     White Coat syndrome    Per Wife- BP at home is in the 1tens-120s/80s         Anxiety disorder    Current Assessment & Plan     Possible some depression as well as he can not do what he once was able.  Start Zoloft 25 mg daily         Cough    Current Assessment & Plan     Chronic lingering dry cough after COVID             Relevant Orders    CT Chest Without Contrast    Six Minute Walk Test to qualify for Home Oxygen    Asbestos-induced pleural plaque    Current Assessment & Plan     See above  And follow up pulmonary         Relevant Orders    Complete PFT w/ bronchodilator    Movement disorder    Current Assessment & Plan     I have made him walk in clinic today he has a staggering gait and has tremors    Recommend Evaluation with Dr. Reeves for possible Parkinsons or other Movement disorder.          Relevant Orders     Ambulatory referral/consult to Neurology    Tremor    Current Assessment & Plan     See movement Disorder.    Neuro consult         Atherosclerosis of native coronary artery of native heart without angina pectoris - Primary    Relevant Orders    IN OFFICE EKG 12-LEAD (to Muse)    FERRITIN    IRON AND TIBC    Echo    Iron deficiency anemia    Relevant Medications    sertraline (ZOLOFT) 25 MG tablet    Other Relevant Orders    IN OFFICE EKG 12-LEAD (to Muse)    FERRITIN    IRON AND TIBC    Echo       No follow-ups on file.       Azul Cabrera, AG-ACNP, CVNP-BC

## 2025-02-13 NOTE — ASSESSMENT & PLAN NOTE
I have made him walk in clinic today he has a staggering gait and has tremors    Recommend Evaluation with Dr. Reeves for possible Parkinsons or other Movement disorder.

## 2025-02-13 NOTE — ASSESSMENT & PLAN NOTE
Possible some depression as well as he can not do what he once was able.  Start Zoloft 25 mg daily

## 2025-02-26 ENCOUNTER — HOSPITAL ENCOUNTER (OUTPATIENT)
Dept: CARDIOLOGY | Facility: HOSPITAL | Age: OVER 89
Discharge: HOME | End: 2025-02-26
Attending: NURSE PRACTITIONER
Payer: MEDICARE

## 2025-02-26 VITALS — HEIGHT: 68 IN | BODY MASS INDEX: 24.55 KG/M2 | WEIGHT: 162 LBS

## 2025-02-26 DIAGNOSIS — I25.10 ATHEROSCLEROSIS OF NATIVE CORONARY ARTERY OF NATIVE HEART WITHOUT ANGINA PECTORIS: ICD-10-CM

## 2025-02-26 DIAGNOSIS — D50.8 OTHER IRON DEFICIENCY ANEMIA: ICD-10-CM

## 2025-02-26 LAB
AORTIC ROOT ANNULUS: 3.1 CM
AORTIC VALVE CUSP SEPERATION: 1.9 CM
APICAL FOUR CHAMBER EJECTION FRACTION: 66 %
APICAL TWO CHAMBER EJECTION FRACTION: 58 %
AV INDEX (PROSTH): 0.72
AV MEAN GRADIENT: 5 MMHG
AV PEAK GRADIENT: 9 MMHG
AV REGURGITATION PRESSURE HALF TIME: 1117 MS
AV VALVE AREA BY VELOCITY RATIO: 2.3 CM²
AV VALVE AREA: 2.3 CM²
AV VELOCITY RATIO: 0.73
BSA FOR ECHO PROCEDURE: 1.88 M2
CV ECHO LV RWT: 0.56 CM
DOP CALC AO PEAK VEL: 1.5 M/S
DOP CALC AO VTI: 30 CM
DOP CALC LVOT AREA: 3.1 CM2
DOP CALC LVOT DIAMETER: 2 CM
DOP CALC LVOT PEAK VEL: 1.1 M/S
DOP CALC LVOT STROKE VOLUME: 67.5 CM3
DOP CALCLVOT PEAK VEL VTI: 21.5 CM
E WAVE DECELERATION TIME: 254 MSEC
E/A RATIO: 0.47
E/E' RATIO: 7 M/S
ECHO LV POSTERIOR WALL: 1.1 CM (ref 0.6–1.1)
FRACTIONAL SHORTENING: 35.9 % (ref 28–44)
INTERVENTRICULAR SEPTUM: 1 CM (ref 0.6–1.1)
IVRT: 148 MSEC
LEFT INTERNAL DIMENSION IN SYSTOLE: 2.5 CM (ref 2.1–4)
LEFT VENTRICLE DIASTOLIC VOLUME INDEX: 35.29 ML/M2
LEFT VENTRICLE DIASTOLIC VOLUME: 66 ML
LEFT VENTRICLE END DIASTOLIC VOLUME APICAL 2 CHAMBER: 44.2 ML
LEFT VENTRICLE END DIASTOLIC VOLUME APICAL 4 CHAMBER: 55.7 ML
LEFT VENTRICLE MASS INDEX: 70 G/M2
LEFT VENTRICLE SYSTOLIC VOLUME INDEX: 11.8 ML/M2
LEFT VENTRICLE SYSTOLIC VOLUME: 22 ML
LEFT VENTRICULAR INTERNAL DIMENSION IN DIASTOLE: 3.9 CM (ref 3.5–6)
LEFT VENTRICULAR MASS: 131 G
LV LATERAL E/E' RATIO: 6.1 M/S
LV SEPTAL E/E' RATIO: 8.2 M/S
LVED V (TEICH): 65.9 ML
LVES V (TEICH): 22.3 ML
LVOT MG: 2 MMHG
LVOT MV: 0.67 CM/S
MV PEAK A VEL: 1.04 M/S
MV PEAK E VEL: 0.49 M/S
MV STENOSIS PRESSURE HALF TIME: 122 MS
MV VALVE AREA P 1/2 METHOD: 1.8 CM2
OHS CV RV/LV RATIO: 0.49 CM
OHS LV EJECTION FRACTION SIMPSONS BIPLANE MOD: 66 %
PISA AR MAX VEL: 2.05 M/S
PISA TR MAX VEL: 2.3 M/S
RA PRESSURE ESTIMATED: 3 MMHG
RIGHT VENTRICLE DIASTOLIC BASEL DIMENSION: 1.9 CM
RIGHT VENTRICULAR END-DIASTOLIC DIMENSION: 1.9 CM
RV TB RVSP: 5 MMHG
TDI LATERAL: 0.08 M/S
TDI SEPTAL: 0.06 M/S
TDI: 0.07 M/S
TV REST PULMONARY ARTERY PRESSURE: 24 MMHG
Z-SCORE OF LEFT VENTRICULAR DIMENSION IN END DIASTOLE: -2.9
Z-SCORE OF LEFT VENTRICULAR DIMENSION IN END SYSTOLE: -1.99

## 2025-02-26 PROCEDURE — 93306 TTE W/DOPPLER COMPLETE: CPT | Mod: 26,,, | Performed by: INTERNAL MEDICINE

## 2025-02-26 PROCEDURE — 93306 TTE W/DOPPLER COMPLETE: CPT

## 2025-02-27 ENCOUNTER — HOSPITAL ENCOUNTER (OUTPATIENT)
Dept: PULMONOLOGY | Facility: HOSPITAL | Age: OVER 89
Discharge: HOME | End: 2025-02-27
Attending: NURSE PRACTITIONER
Payer: MEDICARE

## 2025-02-27 ENCOUNTER — HOSPITAL ENCOUNTER (OUTPATIENT)
Dept: RADIOLOGY | Facility: HOSPITAL | Age: OVER 89
Discharge: HOME | End: 2025-02-27
Attending: NURSE PRACTITIONER
Payer: MEDICARE

## 2025-02-27 DIAGNOSIS — J92.0 ASBESTOS-INDUCED PLEURAL PLAQUE: ICD-10-CM

## 2025-02-27 DIAGNOSIS — R05.2 SUBACUTE COUGH: ICD-10-CM

## 2025-02-27 LAB
DLCO SINGLE BREATH LLN: 14.83
DLCO SINGLE BREATH PRE REF: 57.8 %
DLCO SINGLE BREATH REF: 21.75
DLCOC SBVA LLN: 2.06
DLCOC SBVA REF: 3.24
DLCOC SINGLE BREATH LLN: 14.83
DLCOC SINGLE BREATH REF: 21.75
DLCOVA LLN: 2.06
DLCOVA PRE REF: 85.6 %
DLCOVA PRE: 2.77 ML/(MIN*MMHG*L) (ref 2.06–4.41)
DLCOVA REF: 3.24
ERVN2 LLN: -16449.24
ERVN2 PRE REF: 108.2 %
ERVN2 PRE: 0.82 L (ref -16449.24–16450.76)
ERVN2 REF: 0.76
FEF 25 75 LLN: 0.51
FEF 25 75 PRE REF: 82.4 %
FEF 25 75 REF: 2.22
FEV1 FVC LLN: 58
FEV1 FVC PRE REF: 99.5 %
FEV1 FVC REF: 74
FEV1 LLN: 1.63
FEV1 PRE REF: 102 %
FEV1 REF: 2.45
FRCN2 LLN: 2.77
FRCN2 PRE REF: 63.4 %
FRCN2 REF: 3.75
FVC LLN: 2.36
FVC PRE REF: 101.2 %
FVC REF: 3.36
IVC PRE: 3.11 L (ref 2.36–4.38)
IVC SINGLE BREATH LLN: 2.36
IVC SINGLE BREATH PRE REF: 92.5 %
IVC SINGLE BREATH REF: 3.36
PEF LLN: 3.37
PEF PRE REF: 99.9 %
PEF REF: 5.56
PRE DLCO: 12.57 ML/(MIN*MMHG) (ref 14.83–28.68)
PRE FEF 25 75: 1.83 L/S (ref 0.51–3.93)
PRE FET 100: 6.11 SEC
PRE FEV1 FVC: 73.58 % (ref 57.86–88.85)
PRE FEV1: 2.5 L (ref 1.63–3.2)
PRE FRC N2: 2.38 L (ref 2.77–4.74)
PRE FVC: 3.4 L (ref 2.36–4.38)
PRE PEF: 5.55 L/S (ref 3.37–7.74)
RVN2 LLN: 2.32
RVN2 PRE REF: 52 %
RVN2 PRE: 1.55 L (ref 2.32–3.67)
RVN2 REF: 2.99
RVN2TLCN2 LLN: 39.69
RVN2TLCN2 PRE REF: 64.5 %
RVN2TLCN2 PRE: 31.37 % (ref 39.69–57.65)
RVN2TLCN2 REF: 48.67
TLCN2 LLN: 5.57
TLCN2 PRE REF: 73.7 %
TLCN2 PRE: 4.96 L (ref 5.57–7.87)
TLCN2 REF: 6.72
VA PRE: 4.54 L (ref 6.57–6.57)
VA SINGLE BREATH LLN: 6.57
VA SINGLE BREATH PRE REF: 69.1 %
VA SINGLE BREATH REF: 6.57
VCMAXN2 LLN: 2.36
VCMAXN2 PRE REF: 101.2 %
VCMAXN2 PRE: 3.4 L (ref 2.36–4.38)
VCMAXN2 REF: 3.36

## 2025-02-27 PROCEDURE — 94618 PULMONARY STRESS TESTING: CPT | Performed by: CLINIC/CENTER

## 2025-02-27 PROCEDURE — 71250 CT THORAX DX C-: CPT | Mod: 26,,, | Performed by: RADIOLOGY

## 2025-02-27 PROCEDURE — 94729 DIFFUSING CAPACITY: CPT | Performed by: CLINIC/CENTER

## 2025-02-27 PROCEDURE — 71250 CT THORAX DX C-: CPT | Mod: TC

## 2025-02-27 PROCEDURE — 94010 BREATHING CAPACITY TEST: CPT | Performed by: CLINIC/CENTER

## 2025-02-27 PROCEDURE — 94727 GAS DIL/WSHOT DETER LNG VOL: CPT | Performed by: CLINIC/CENTER

## 2025-03-25 ENCOUNTER — LAB VISIT (OUTPATIENT)
Dept: LAB | Facility: HOSPITAL | Age: OVER 89
End: 2025-03-25
Payer: MEDICARE

## 2025-03-25 DIAGNOSIS — E78.5 HYPERLIPIDEMIA, UNSPECIFIED HYPERLIPIDEMIA TYPE: Primary | ICD-10-CM

## 2025-03-25 DIAGNOSIS — I10 ESSENTIAL HYPERTENSION, MALIGNANT: ICD-10-CM

## 2025-03-25 DIAGNOSIS — N18.31 CHRONIC KIDNEY DISEASE (CKD) STAGE G3A/A1, MODERATELY DECREASED GLOMERULAR FILTRATION RATE (GFR) BETWEEN 45-59 ML/MIN/1.73 SQUARE METER AND ALBUMINURIA CREATININE RATIO LESS THAN 30 MG/G: ICD-10-CM

## 2025-03-25 LAB
ABSOLUTE EOSINOPHIL (SMH): 0.1 K/UL
ABSOLUTE MONOCYTE (SMH): 0.57 K/UL (ref 0.3–1)
ABSOLUTE NEUTROPHIL COUNT (SMH): 2.7 K/UL (ref 1.8–7.7)
ALBUMIN SERPL-MCNC: 4 G/DL (ref 3.5–5.2)
ALBUMIN/CREAT UR: NORMAL
ANION GAP (SMH): 5 MMOL/L (ref 8–16)
BASOPHILS # BLD AUTO: 0.06 K/UL
BASOPHILS NFR BLD AUTO: 1.1 %
BUN SERPL-MCNC: 16 MG/DL (ref 8–23)
CALCIUM SERPL-MCNC: 9.1 MG/DL (ref 8.7–10.5)
CHLORIDE SERPL-SCNC: 106 MMOL/L (ref 95–110)
CHOLEST SERPL-MCNC: 225 MG/DL (ref 120–199)
CHOLEST/HDLC SERPL: 2.9 {RATIO} (ref 2–5)
CO2 SERPL-SCNC: 28 MMOL/L (ref 23–29)
CREAT SERPL-MCNC: 1.3 MG/DL (ref 0.5–1.4)
CREAT UR-MCNC: 125.6 MG/DL (ref 23–375)
ERYTHROCYTE [DISTWIDTH] IN BLOOD BY AUTOMATED COUNT: 13.2 % (ref 11.5–14.5)
GFR SERPLBLD CREATININE-BSD FMLA CKD-EPI: 53 ML/MIN/1.73/M2
GLUCOSE SERPL-MCNC: 89 MG/DL (ref 70–110)
HCT VFR BLD AUTO: 39 % (ref 40–54)
HDLC SERPL-MCNC: 78 MG/DL (ref 40–75)
HDLC SERPL: 34.7 % (ref 20–50)
HGB BLD-MCNC: 13 GM/DL (ref 14–18)
IMM GRANULOCYTES # BLD AUTO: 0.02 K/UL (ref 0–0.04)
IMM GRANULOCYTES NFR BLD AUTO: 0.4 % (ref 0–0.5)
LDLC SERPL CALC-MCNC: 122 MG/DL (ref 63–159)
LYMPHOCYTES # BLD AUTO: 1.93 K/UL (ref 1–4.8)
MCH RBC QN AUTO: 29.4 PG (ref 27–31)
MCHC RBC AUTO-ENTMCNC: 33.3 G/DL (ref 32–36)
MCV RBC AUTO: 88 FL (ref 82–98)
MICROALBUMIN UR-MCNC: <7 UG/ML
NONHDLC SERPL-MCNC: 147 MG/DL
NUCLEATED RBC (/100WBC) (SMH): 0 /100 WBC
PHOSPHATE SERPL-MCNC: 3.5 MG/DL (ref 2.7–4.5)
PLATELET # BLD AUTO: 167 K/UL (ref 150–450)
PMV BLD AUTO: 10 FL (ref 9.2–12.9)
POTASSIUM SERPL-SCNC: 4.4 MMOL/L (ref 3.5–5.1)
RBC # BLD AUTO: 4.42 M/UL (ref 4.6–6.2)
RELATIVE EOSINOPHIL (SMH): 1.9 % (ref 0–8)
RELATIVE LYMPHOCYTE (SMH): 36.2 % (ref 18–48)
RELATIVE MONOCYTE (SMH): 10.7 % (ref 4–15)
RELATIVE NEUTROPHIL (SMH): 49.7 % (ref 38–73)
SODIUM SERPL-SCNC: 139 MMOL/L (ref 136–145)
TRIGL SERPL-MCNC: 125 MG/DL (ref 30–150)
WBC # BLD AUTO: 5.33 K/UL (ref 3.9–12.7)

## 2025-03-25 PROCEDURE — 82043 UR ALBUMIN QUANTITATIVE: CPT

## 2025-03-25 PROCEDURE — 82040 ASSAY OF SERUM ALBUMIN: CPT

## 2025-03-25 PROCEDURE — 84478 ASSAY OF TRIGLYCERIDES: CPT

## 2025-03-25 PROCEDURE — 36415 COLL VENOUS BLD VENIPUNCTURE: CPT

## 2025-03-25 PROCEDURE — 85025 COMPLETE CBC W/AUTO DIFF WBC: CPT

## 2025-04-07 ENCOUNTER — HOSPITAL ENCOUNTER (OUTPATIENT)
Dept: RADIOLOGY | Facility: HOSPITAL | Age: OVER 89
Discharge: HOME | End: 2025-04-07
Attending: NURSE PRACTITIONER
Payer: MEDICARE

## 2025-04-07 DIAGNOSIS — M17.0 PRIMARY OSTEOARTHRITIS OF BOTH KNEES: Primary | ICD-10-CM

## 2025-04-07 DIAGNOSIS — M17.0 PRIMARY OSTEOARTHRITIS OF BOTH KNEES: ICD-10-CM

## 2025-04-07 PROCEDURE — 73562 X-RAY EXAM OF KNEE 3: CPT | Mod: 26,50,, | Performed by: RADIOLOGY

## 2025-04-07 PROCEDURE — 73562 X-RAY EXAM OF KNEE 3: CPT | Mod: TC,50,PO

## 2025-07-17 DIAGNOSIS — R13.10 DYSPHAGIA, UNSPECIFIED TYPE: ICD-10-CM

## 2025-07-17 DIAGNOSIS — K21.9 LARYNGOPHARYNGEAL REFLUX: ICD-10-CM

## 2025-07-17 DIAGNOSIS — J38.01 PARALYSIS OF RIGHT VOCAL CORD: Primary | ICD-10-CM

## (undated) DEVICE — APPLICATOR CHLORAPREP ORN 26ML

## (undated) DEVICE — CONTAINER SPECIMEN OR STER 4OZ

## (undated) DEVICE — SPONGE BULKEE II ABSRB 6X6.75

## (undated) DEVICE — GLOVE SENSICARE PI ALOE 7.5

## (undated) DEVICE — NDL SPINAL SPINOCAN 22GX3.5

## (undated) DEVICE — NDL ECLIPSE SAF REG 25GX1.5IN

## (undated) DEVICE — NDL TUOHY EPIDURAL 20G X 3.5

## (undated) DEVICE — SYR GLASS 5CC LUER LOK

## (undated) DEVICE — GLOVE SENSICARE PI GRN 7.5

## (undated) DEVICE — SYR 10CC LUER LOCK

## (undated) DEVICE — STRAP OR TABLE 5IN X 72IN

## (undated) DEVICE — TUBING MINIBORE EXTENSION

## (undated) DEVICE — TOWEL OR DISP STRL BLUE 4/PK

## (undated) DEVICE — CHLORAPREP 10.5 ML APPLICATOR

## (undated) DEVICE — SYS LABEL CORRECT MED